# Patient Record
Sex: MALE | Race: WHITE | NOT HISPANIC OR LATINO | Employment: OTHER | URBAN - METROPOLITAN AREA
[De-identification: names, ages, dates, MRNs, and addresses within clinical notes are randomized per-mention and may not be internally consistent; named-entity substitution may affect disease eponyms.]

---

## 2019-01-28 PROCEDURE — 1124F ACP DISCUSS-NO DSCNMKR DOCD: CPT | Performed by: PATHOLOGY

## 2019-02-06 ENCOUNTER — APPOINTMENT (OUTPATIENT)
Dept: LAB | Facility: HOSPITAL | Age: 64
End: 2019-02-06
Attending: ORTHOPAEDIC SURGERY
Payer: MEDICARE

## 2019-02-06 ENCOUNTER — HOSPITAL ENCOUNTER (OUTPATIENT)
Dept: NON INVASIVE DIAGNOSTICS | Facility: HOSPITAL | Age: 64
Discharge: HOME | End: 2019-02-06
Attending: ORTHOPAEDIC SURGERY
Payer: MEDICARE

## 2019-02-06 ENCOUNTER — TRANSCRIBE ORDERS (OUTPATIENT)
Dept: ADMINISTRATIVE | Facility: HOSPITAL | Age: 64
End: 2019-02-06

## 2019-02-06 ENCOUNTER — HOSPITAL ENCOUNTER (OUTPATIENT)
Dept: RADIOLOGY | Facility: HOSPITAL | Age: 64
Discharge: HOME | End: 2019-02-06
Attending: ORTHOPAEDIC SURGERY
Payer: MEDICARE

## 2019-02-06 DIAGNOSIS — M17.11 OSTEOARTHRITIS OF RIGHT KNEE, UNSPECIFIED OSTEOARTHRITIS TYPE: ICD-10-CM

## 2019-02-06 DIAGNOSIS — M17.11 OSTEOARTHRITIS OF RIGHT KNEE, UNSPECIFIED OSTEOARTHRITIS TYPE: Primary | ICD-10-CM

## 2019-02-06 LAB
ABO GROUP BLD: NORMAL
ANION GAP SERPL CALCULATED.3IONS-SCNC: 9 MMOL/L (ref 4–13)
APTT PPP: 57 SECONDS (ref 26–38)
ATRIAL RATE: 69 BPM
BASOPHILS # BLD AUTO: 0.1 THOUSANDS/ΜL (ref 0–0.1)
BASOPHILS NFR BLD AUTO: 1 % (ref 0–2)
BILIRUB UR QL STRIP: NEGATIVE
BLD GP AB SCN SERPL QL: NEGATIVE
BUN SERPL-MCNC: 18 MG/DL (ref 7–25)
CALCIUM SERPL-MCNC: 9.6 MG/DL (ref 8.6–10.5)
CHLORIDE SERPL-SCNC: 110 MMOL/L (ref 98–107)
CLARITY UR: CLEAR
CO2 SERPL-SCNC: 20 MMOL/L (ref 21–31)
COLOR UR: YELLOW
CREAT SERPL-MCNC: 1.05 MG/DL (ref 0.7–1.3)
EOSINOPHIL # BLD AUTO: 0.3 THOUSAND/ΜL (ref 0–0.61)
EOSINOPHIL NFR BLD AUTO: 4 % (ref 0–5)
ERYTHROCYTE [DISTWIDTH] IN BLOOD BY AUTOMATED COUNT: 16 % (ref 11.5–14.5)
GFR SERPL CREATININE-BSD FRML MDRD: 74 ML/MIN/1.73SQ M
GLUCOSE P FAST SERPL-MCNC: 95 MG/DL (ref 65–99)
GLUCOSE UR STRIP-MCNC: NEGATIVE MG/DL
HCT VFR BLD AUTO: 41.8 % (ref 36.5–49.3)
HGB BLD-MCNC: 13.9 G/DL (ref 14–18)
HGB UR QL STRIP.AUTO: NEGATIVE
INR PPP: 0.96 (ref 0.9–1.5)
KETONES UR STRIP-MCNC: NEGATIVE MG/DL
LEUKOCYTE ESTERASE UR QL STRIP: NEGATIVE
LYMPHOCYTES # BLD AUTO: 2.5 THOUSANDS/ΜL (ref 0.6–4.47)
LYMPHOCYTES NFR BLD AUTO: 31 % (ref 21–51)
MCH RBC QN AUTO: 29.3 PG (ref 26–34)
MCHC RBC AUTO-ENTMCNC: 33.3 G/DL (ref 31–37)
MCV RBC AUTO: 88 FL (ref 81–99)
MONOCYTES # BLD AUTO: 0.6 THOUSAND/ΜL (ref 0.17–1.22)
MONOCYTES NFR BLD AUTO: 8 % (ref 2–12)
NEUTROPHILS # BLD AUTO: 4.6 THOUSANDS/ΜL (ref 1.4–6.5)
NEUTS SEG NFR BLD AUTO: 56 % (ref 42–75)
NITRITE UR QL STRIP: NEGATIVE
NRBC BLD AUTO-RTO: 0 /100 WBCS
P AXIS: 7 DEGREES
PH UR STRIP.AUTO: 5.5 [PH] (ref 5–8)
PLATELET # BLD AUTO: 317 THOUSANDS/UL (ref 149–390)
PMV BLD AUTO: 7.8 FL (ref 8.6–11.7)
POTASSIUM SERPL-SCNC: 4.2 MMOL/L (ref 3.5–5.5)
PR INTERVAL: 180 MS
PROT UR STRIP-MCNC: NEGATIVE MG/DL
PROTHROMBIN TIME: 11.1 SECONDS (ref 10.2–13)
QRS AXIS: 13 DEGREES
QRSD INTERVAL: 84 MS
QT INTERVAL: 402 MS
QTC INTERVAL: 430 MS
RBC # BLD AUTO: 4.75 MILLION/UL (ref 4.3–5.9)
RH BLD: POSITIVE
SODIUM SERPL-SCNC: 139 MMOL/L (ref 134–143)
SP GR UR STRIP.AUTO: 1.02 (ref 1–1.03)
SPECIMEN EXPIRATION DATE: NORMAL
T WAVE AXIS: 51 DEGREES
UROBILINOGEN UR QL STRIP.AUTO: 0.2 E.U./DL
VENTRICULAR RATE: 69 BPM
WBC # BLD AUTO: 8.2 THOUSAND/UL (ref 4.8–10.8)

## 2019-02-06 PROCEDURE — 86900 BLOOD TYPING SEROLOGIC ABO: CPT

## 2019-02-06 PROCEDURE — 86850 RBC ANTIBODY SCREEN: CPT

## 2019-02-06 PROCEDURE — 80048 BASIC METABOLIC PNL TOTAL CA: CPT

## 2019-02-06 PROCEDURE — 36415 COLL VENOUS BLD VENIPUNCTURE: CPT

## 2019-02-06 PROCEDURE — 85025 COMPLETE CBC W/AUTO DIFF WBC: CPT

## 2019-02-06 PROCEDURE — 93005 ELECTROCARDIOGRAM TRACING: CPT

## 2019-02-06 PROCEDURE — 85730 THROMBOPLASTIN TIME PARTIAL: CPT

## 2019-02-06 PROCEDURE — 71046 X-RAY EXAM CHEST 2 VIEWS: CPT

## 2019-02-06 PROCEDURE — 93010 ELECTROCARDIOGRAM REPORT: CPT | Performed by: INTERNAL MEDICINE

## 2019-02-06 PROCEDURE — 87086 URINE CULTURE/COLONY COUNT: CPT

## 2019-02-06 PROCEDURE — 86901 BLOOD TYPING SEROLOGIC RH(D): CPT

## 2019-02-06 PROCEDURE — 81003 URINALYSIS AUTO W/O SCOPE: CPT | Performed by: ORTHOPAEDIC SURGERY

## 2019-02-06 PROCEDURE — 85610 PROTHROMBIN TIME: CPT

## 2019-02-07 LAB — BACTERIA UR CULT: NORMAL

## 2019-02-07 RX ORDER — IBUPROFEN 200 MG
600 TABLET ORAL EVERY 8 HOURS PRN
COMMUNITY
End: 2019-03-01 | Stop reason: HOSPADM

## 2019-02-07 RX ORDER — PSEUDOEPHEDRINE HYDROCHLORIDE 30 MG/1
30 TABLET ORAL AS NEEDED
Status: ON HOLD | COMMUNITY
End: 2021-01-13

## 2019-02-07 NOTE — PRE-PROCEDURE INSTRUCTIONS
edication Instructions    ibuprofen (MOTRIN) 200 mg tablet Patient was instructed by Physician and understands  Pt aware to hold motrin for one week prior to surgery    pseudoephedrine (SUDAFED) 30 mg tablet Instructed patient per Anesthesia Guidelines  My Surgical Experience    The following information was developed to assist you to prepare for your operation  What do I need to do before coming to the hospital?   Arrange for a responsible person to drive you to and from the hospital    Arrange care for your children at home  Children are not allowed in the recovery areas of the hospital   Plan to wear clothing that is easy to put on and take off   DO NOT SMOKE THE DAY OF YOUR SURGERY  Bathing  o Shower the evening before and the morning of your surgery with an antibacterial soap  Please refer to the Pre Op Showering Instructions for Surgery Patients Sheet  Shower with Hibiclens the evening before and am of your surgery from the neck down  Wear clean pajamas, sheets and clean cloths in the am   o Remove  all body piercing and jewelry  o   o Do not shave any body part for at least 24 hours before surgery-this includes face, arms, legs and upper body  Food  o Nothing to eat or drink after midnight the night before your surgery  This includes candy and chewing gum  o Do not drink alcohol 24 hours before surgery  Medicine  o Follow instructions you received from your surgeon about which medicines you may take on the day of surgery  o If instructed to take medicine on the morning of surgery, take pills with just a small sip of water  Call your prescribing doctor for specific infroamtion on what to do if you take insulin    What should I bring to the hospital?    Bring:  Chinyere Becerril or a walker, if you have them, for foot or knee surgery   A list of the daily medicines, vitamins, minerals, herbals and nutritional supplements you take   Include the dosages of medicines and the time you take them each day   Glasses, dentures or hearing aids   Minimal clothing; you will be wearing hospital sleepwear   Photo ID; required to verify your identity    Do not bring   Medicines or inhalers   Money, valuables or jewelry    What other information should I know about the day of surgery?  Notify your surgeons if you develop a cold, sore throat, cough, fever, rash or any other illness   Report to the Ambulatory Surgical/Same Day Surgery Unit   You will be instructed to stop at Registration only if you have not been pre-registered   Inform your  fi they do not stay that they will be asked by the staff to leave a phone number where they can be reached   Be available to be reached before surgery  In the event the operating room schedule changes, you may be asked to come in earlier or later than expected    *It is important to tell your doctor and others involved in your health care if you are taking or have been taking any non-prescription drugs, vitamins, minerals, herbals or other nutritional supplements   Any of these may interact with some food or medicines and cause a reaction

## 2019-02-14 ENCOUNTER — ANESTHESIA EVENT (OUTPATIENT)
Dept: PERIOP | Facility: HOSPITAL | Age: 64
DRG: 470 | End: 2019-02-14
Payer: MEDICARE

## 2019-02-14 NOTE — ANESTHESIA PREPROCEDURE EVALUATION
Review of Systems/Medical History  Patient summary reviewed  Chart reviewed      Cardiovascular  EKG reviewed,   Comment: EKG NSR,  Pulmonary    Comment: CXR NAPD     GI/Hepatic            Endo/Other     GYN       Hematology   Musculoskeletal    Arthritis     Neurology   Psychology         Lab Results   Component Value Date    WBC 8 20 02/06/2019    HGB 13 9 (L) 02/06/2019    HCT 41 8 02/06/2019    MCV 88 02/06/2019     02/06/2019     Lab Results   Component Value Date    CALCIUM 9 6 02/06/2019    K 4 2 02/06/2019    CO2 20 (L) 02/06/2019     (H) 02/06/2019    BUN 18 02/06/2019    CREATININE 1 05 02/06/2019     Lab Results   Component Value Date    INR 0 96 02/06/2019    PROTIME 11 1 02/06/2019     Lab Results   Component Value Date    PTT 57 (H) 02/06/2019   repeat ptt 35    Anesthesia Plan  ASA Score- 3     Anesthesia Type- regional and spinal with ASA Monitors  Additional Monitors:   Airway Plan:     Comment: Risks,  benefits and alternatives to Femoral/adductor canal block and IPACK  Sciatic block explained to pt  and accepted questions answered  Risks, benefits and alternatives to Spinal vs  GA explained to pt  and accepted  Questions answered  Pt agrees to Femoral/adductor canal block and IPACK sciatic block and spinal  Risks,  benefits and alternatives to Femoral/adductor canal block and IPACK  Sciatic block explained to pt  and accepted questions answered        Plan Factors- Patient instructed to abstain from smoking on day of procedure  Patient did not smoke on day of surgery  Induction- intravenous  Postoperative Plan- Plan for postoperative opioid use  Informed Consent- Anesthetic plan and risks discussed with patient  I personally reviewed this patient with the CRNA  Discussed and agreed on the Anesthesia Plan with the CRNA  Wesley Schuler

## 2019-02-26 ENCOUNTER — APPOINTMENT (OUTPATIENT)
Dept: RADIOLOGY | Facility: HOSPITAL | Age: 64
DRG: 470 | End: 2019-02-26
Payer: MEDICARE

## 2019-02-26 ENCOUNTER — HOSPITAL ENCOUNTER (INPATIENT)
Facility: HOSPITAL | Age: 64
LOS: 3 days | Discharge: HOME HEALTH | DRG: 470 | End: 2019-03-01
Attending: PHYSICIAN ASSISTANT | Admitting: PHYSICIAN ASSISTANT
Payer: MEDICARE

## 2019-02-26 ENCOUNTER — ANESTHESIA (OUTPATIENT)
Dept: PERIOP | Facility: HOSPITAL | Age: 64
DRG: 470 | End: 2019-02-26
Payer: MEDICARE

## 2019-02-26 DIAGNOSIS — M17.11 PRIMARY OSTEOARTHRITIS OF RIGHT KNEE: Primary | ICD-10-CM

## 2019-02-26 PROBLEM — Z72.0 TOBACCO ABUSE: Status: ACTIVE | Noted: 2019-02-12

## 2019-02-26 PROBLEM — N40.0 BPH (BENIGN PROSTATIC HYPERPLASIA): Status: ACTIVE | Noted: 2019-02-12

## 2019-02-26 PROCEDURE — 94760 N-INVAS EAR/PLS OXIMETRY 1: CPT

## 2019-02-26 PROCEDURE — C1776 JOINT DEVICE (IMPLANTABLE): HCPCS | Performed by: ORTHOPAEDIC SURGERY

## 2019-02-26 PROCEDURE — C1713 ANCHOR/SCREW BN/BN,TIS/BN: HCPCS | Performed by: ORTHOPAEDIC SURGERY

## 2019-02-26 PROCEDURE — 99222 1ST HOSP IP/OBS MODERATE 55: CPT | Performed by: HOSPITALIST

## 2019-02-26 PROCEDURE — 97799 UNLISTED PHYSCL MED/REHAB PX: CPT

## 2019-02-26 PROCEDURE — 97530 THERAPEUTIC ACTIVITIES: CPT

## 2019-02-26 PROCEDURE — 0SRC0J9 REPLACEMENT OF RIGHT KNEE JOINT WITH SYNTHETIC SUBSTITUTE, CEMENTED, OPEN APPROACH: ICD-10-PCS | Performed by: PHYSICIAN ASSISTANT

## 2019-02-26 PROCEDURE — 88311 DECALCIFY TISSUE: CPT | Performed by: PATHOLOGY

## 2019-02-26 PROCEDURE — 88305 TISSUE EXAM BY PATHOLOGIST: CPT | Performed by: PATHOLOGY

## 2019-02-26 PROCEDURE — 3E0T3BZ INTRODUCTION OF ANESTHETIC AGENT INTO PERIPHERAL NERVES AND PLEXI, PERCUTANEOUS APPROACH: ICD-10-PCS | Performed by: ANESTHESIOLOGY

## 2019-02-26 PROCEDURE — 73560 X-RAY EXAM OF KNEE 1 OR 2: CPT

## 2019-02-26 DEVICE — IMPLANTABLE DEVICE
Type: IMPLANTABLE DEVICE | Site: KNEE | Status: FUNCTIONAL
Brand: NEXGEN®

## 2019-02-26 DEVICE — IMPLANTABLE DEVICE
Type: IMPLANTABLE DEVICE | Site: KNEE | Status: FUNCTIONAL
Brand: NEXGEN® LEGACY®

## 2019-02-26 RX ORDER — DEXTROSE MONOHYDRATE AND SODIUM CHLORIDE 5; .45 G/100ML; G/100ML
75 INJECTION, SOLUTION INTRAVENOUS CONTINUOUS
Status: DISCONTINUED | OUTPATIENT
Start: 2019-02-26 | End: 2019-03-01 | Stop reason: HOSPADM

## 2019-02-26 RX ORDER — FENTANYL CITRATE 50 UG/ML
INJECTION, SOLUTION INTRAMUSCULAR; INTRAVENOUS AS NEEDED
Status: DISCONTINUED | OUTPATIENT
Start: 2019-02-26 | End: 2019-02-26 | Stop reason: SURG

## 2019-02-26 RX ORDER — EPHEDRINE SULFATE 50 MG/ML
INJECTION, SOLUTION INTRAVENOUS AS NEEDED
Status: DISCONTINUED | OUTPATIENT
Start: 2019-02-26 | End: 2019-02-26 | Stop reason: SURG

## 2019-02-26 RX ORDER — ONDANSETRON 2 MG/ML
4 INJECTION INTRAMUSCULAR; INTRAVENOUS ONCE
Status: DISCONTINUED | OUTPATIENT
Start: 2019-02-26 | End: 2019-02-26 | Stop reason: HOSPADM

## 2019-02-26 RX ORDER — FENTANYL CITRATE/PF 50 MCG/ML
50 SYRINGE (ML) INJECTION
Status: DISCONTINUED | OUTPATIENT
Start: 2019-02-26 | End: 2019-02-26 | Stop reason: HOSPADM

## 2019-02-26 RX ORDER — PROPOFOL 10 MG/ML
INJECTION, EMULSION INTRAVENOUS CONTINUOUS PRN
Status: DISCONTINUED | OUTPATIENT
Start: 2019-02-26 | End: 2019-02-26 | Stop reason: SURG

## 2019-02-26 RX ORDER — GENTAMICIN SULFATE 80 MG/100ML
80 INJECTION, SOLUTION INTRAVENOUS ONCE
Status: COMPLETED | OUTPATIENT
Start: 2019-02-26 | End: 2019-02-26

## 2019-02-26 RX ORDER — MORPHINE SULFATE 4 MG/ML
3 INJECTION, SOLUTION INTRAMUSCULAR; INTRAVENOUS EVERY 4 HOURS PRN
Status: DISCONTINUED | OUTPATIENT
Start: 2019-02-26 | End: 2019-03-01 | Stop reason: HOSPADM

## 2019-02-26 RX ORDER — HYDROMORPHONE HCL/PF 1 MG/ML
0.4 SYRINGE (ML) INJECTION
Status: DISCONTINUED | OUTPATIENT
Start: 2019-02-26 | End: 2019-02-26 | Stop reason: HOSPADM

## 2019-02-26 RX ORDER — CEFAZOLIN SODIUM 2 G/50ML
2000 SOLUTION INTRAVENOUS ONCE
Status: DISCONTINUED | OUTPATIENT
Start: 2019-02-26 | End: 2019-02-26

## 2019-02-26 RX ORDER — SODIUM CHLORIDE, SODIUM LACTATE, POTASSIUM CHLORIDE, CALCIUM CHLORIDE 600; 310; 30; 20 MG/100ML; MG/100ML; MG/100ML; MG/100ML
125 INJECTION, SOLUTION INTRAVENOUS CONTINUOUS
Status: DISCONTINUED | OUTPATIENT
Start: 2019-02-26 | End: 2019-02-26

## 2019-02-26 RX ORDER — PROPOFOL 10 MG/ML
INJECTION, EMULSION INTRAVENOUS AS NEEDED
Status: DISCONTINUED | OUTPATIENT
Start: 2019-02-26 | End: 2019-02-26 | Stop reason: SURG

## 2019-02-26 RX ORDER — BUPIVACAINE HYDROCHLORIDE 5 MG/ML
INJECTION, SOLUTION PERINEURAL AS NEEDED
Status: DISCONTINUED | OUTPATIENT
Start: 2019-02-26 | End: 2019-02-26 | Stop reason: SURG

## 2019-02-26 RX ORDER — ONDANSETRON 2 MG/ML
4 INJECTION INTRAMUSCULAR; INTRAVENOUS ONCE AS NEEDED
Status: DISCONTINUED | OUTPATIENT
Start: 2019-02-26 | End: 2019-02-26 | Stop reason: HOSPADM

## 2019-02-26 RX ORDER — ROPIVACAINE HYDROCHLORIDE 5 MG/ML
INJECTION, SOLUTION EPIDURAL; INFILTRATION; PERINEURAL AS NEEDED
Status: DISCONTINUED | OUTPATIENT
Start: 2019-02-26 | End: 2019-02-26 | Stop reason: SURG

## 2019-02-26 RX ORDER — DEXAMETHASONE SODIUM PHOSPHATE 4 MG/ML
8 INJECTION, SOLUTION INTRA-ARTICULAR; INTRALESIONAL; INTRAMUSCULAR; INTRAVENOUS; SOFT TISSUE ONCE AS NEEDED
Status: DISCONTINUED | OUTPATIENT
Start: 2019-02-26 | End: 2019-02-26 | Stop reason: HOSPADM

## 2019-02-26 RX ORDER — DOCUSATE SODIUM 100 MG/1
100 CAPSULE, LIQUID FILLED ORAL 2 TIMES DAILY
Status: DISCONTINUED | OUTPATIENT
Start: 2019-02-26 | End: 2019-03-01 | Stop reason: HOSPADM

## 2019-02-26 RX ORDER — MIDAZOLAM HYDROCHLORIDE 1 MG/ML
INJECTION INTRAMUSCULAR; INTRAVENOUS AS NEEDED
Status: DISCONTINUED | OUTPATIENT
Start: 2019-02-26 | End: 2019-02-26 | Stop reason: SURG

## 2019-02-26 RX ORDER — SODIUM CHLORIDE, SODIUM LACTATE, POTASSIUM CHLORIDE, CALCIUM CHLORIDE 600; 310; 30; 20 MG/100ML; MG/100ML; MG/100ML; MG/100ML
100 INJECTION, SOLUTION INTRAVENOUS CONTINUOUS
Status: DISCONTINUED | OUTPATIENT
Start: 2019-02-26 | End: 2019-02-26 | Stop reason: SDUPTHER

## 2019-02-26 RX ORDER — EPINEPHRINE 1 MG/ML
INJECTION, SOLUTION, CONCENTRATE INTRAVENOUS AS NEEDED
Status: DISCONTINUED | OUTPATIENT
Start: 2019-02-26 | End: 2019-02-26 | Stop reason: SURG

## 2019-02-26 RX ORDER — CEFAZOLIN SODIUM 2 G/50ML
SOLUTION INTRAVENOUS AS NEEDED
Status: DISCONTINUED | OUTPATIENT
Start: 2019-02-26 | End: 2019-02-26 | Stop reason: SURG

## 2019-02-26 RX ORDER — POVIDONE-IODINE 10 MG/G
OINTMENT TOPICAL AS NEEDED
Status: DISCONTINUED | OUTPATIENT
Start: 2019-02-26 | End: 2019-02-26 | Stop reason: HOSPADM

## 2019-02-26 RX ORDER — ONDANSETRON 2 MG/ML
INJECTION INTRAMUSCULAR; INTRAVENOUS AS NEEDED
Status: DISCONTINUED | OUTPATIENT
Start: 2019-02-26 | End: 2019-02-26 | Stop reason: SURG

## 2019-02-26 RX ORDER — ACETAMINOPHEN 325 MG/1
650 TABLET ORAL EVERY 4 HOURS PRN
Status: DISCONTINUED | OUTPATIENT
Start: 2019-02-26 | End: 2019-03-01 | Stop reason: HOSPADM

## 2019-02-26 RX ORDER — OXYCODONE HYDROCHLORIDE AND ACETAMINOPHEN 5; 325 MG/1; MG/1
1 TABLET ORAL EVERY 4 HOURS PRN
Status: DISCONTINUED | OUTPATIENT
Start: 2019-02-26 | End: 2019-03-01 | Stop reason: HOSPADM

## 2019-02-26 RX ORDER — CEFAZOLIN SODIUM 1 G/50ML
1000 SOLUTION INTRAVENOUS EVERY 8 HOURS
Status: COMPLETED | OUTPATIENT
Start: 2019-02-26 | End: 2019-02-27

## 2019-02-26 RX ORDER — FONDAPARINUX SODIUM 2.5 MG/.5ML
2.5 INJECTION SUBCUTANEOUS DAILY
Status: DISCONTINUED | OUTPATIENT
Start: 2019-02-27 | End: 2019-03-01 | Stop reason: HOSPADM

## 2019-02-26 RX ADMIN — BUPIVACAINE HYDROCHLORIDE 3 ML: 5 INJECTION, SOLUTION PERINEURAL at 07:20

## 2019-02-26 RX ADMIN — EPINEPHRINE 0.1 MG: 1 INJECTION, SOLUTION INTRAMUSCULAR; SUBCUTANEOUS at 07:00

## 2019-02-26 RX ADMIN — ONDANSETRON HYDROCHLORIDE 4 MG: 2 INJECTION, SOLUTION INTRAVENOUS at 09:03

## 2019-02-26 RX ADMIN — MORPHINE SULFATE 3 MG: 4 INJECTION INTRAVENOUS at 17:42

## 2019-02-26 RX ADMIN — ROPIVACAINE HYDROCHLORIDE 10 ML: 5 INJECTION, SOLUTION EPIDURAL; INFILTRATION; PERINEURAL at 07:00

## 2019-02-26 RX ADMIN — FENTANYL CITRATE 50 MCG: 50 INJECTION INTRAMUSCULAR; INTRAVENOUS at 07:17

## 2019-02-26 RX ADMIN — MORPHINE SULFATE 3 MG: 4 INJECTION INTRAVENOUS at 21:38

## 2019-02-26 RX ADMIN — FENTANYL CITRATE 100 MCG: 50 INJECTION INTRAMUSCULAR; INTRAVENOUS at 06:56

## 2019-02-26 RX ADMIN — DOCUSATE SODIUM 100 MG: 100 CAPSULE, LIQUID FILLED ORAL at 12:03

## 2019-02-26 RX ADMIN — SODIUM CHLORIDE, POTASSIUM CHLORIDE, SODIUM LACTATE AND CALCIUM CHLORIDE 125 ML/HR: 600; 310; 30; 20 INJECTION, SOLUTION INTRAVENOUS at 05:23

## 2019-02-26 RX ADMIN — CEFAZOLIN SODIUM 1000 MG: 1 SOLUTION INTRAVENOUS at 15:22

## 2019-02-26 RX ADMIN — ROPIVACAINE HYDROCHLORIDE 10 ML: 5 INJECTION, SOLUTION EPIDURAL; INFILTRATION; PERINEURAL at 07:08

## 2019-02-26 RX ADMIN — DEXAMETHASONE SODIUM PHOSPHATE 10 MG: 10 INJECTION INTRAMUSCULAR; INTRAVENOUS at 07:08

## 2019-02-26 RX ADMIN — PROPOFOL 30 MG: 10 INJECTION, EMULSION INTRAVENOUS at 07:22

## 2019-02-26 RX ADMIN — BUPIVACAINE HYDROCHLORIDE 10 ML: 5 INJECTION, SOLUTION PERINEURAL at 07:08

## 2019-02-26 RX ADMIN — CEFAZOLIN SODIUM 1000 MG: 1 SOLUTION INTRAVENOUS at 23:52

## 2019-02-26 RX ADMIN — CEFAZOLIN SODIUM 2000 MG: 2 SOLUTION INTRAVENOUS at 07:41

## 2019-02-26 RX ADMIN — MIDAZOLAM HYDROCHLORIDE 1 MG: 1 INJECTION, SOLUTION INTRAMUSCULAR; INTRAVENOUS at 07:17

## 2019-02-26 RX ADMIN — MIDAZOLAM HYDROCHLORIDE 2 MG: 1 INJECTION, SOLUTION INTRAMUSCULAR; INTRAVENOUS at 06:56

## 2019-02-26 RX ADMIN — MIDAZOLAM HYDROCHLORIDE 1 MG: 1 INJECTION, SOLUTION INTRAMUSCULAR; INTRAVENOUS at 07:06

## 2019-02-26 RX ADMIN — MORPHINE SULFATE 3 MG: 4 INJECTION INTRAVENOUS at 13:30

## 2019-02-26 RX ADMIN — OXYCODONE HYDROCHLORIDE AND ACETAMINOPHEN 1 TABLET: 5; 325 TABLET ORAL at 20:22

## 2019-02-26 RX ADMIN — FENTANYL CITRATE 50 MCG: 50 INJECTION INTRAMUSCULAR; INTRAVENOUS at 07:06

## 2019-02-26 RX ADMIN — OXYCODONE HYDROCHLORIDE AND ACETAMINOPHEN 1 TABLET: 5; 325 TABLET ORAL at 15:22

## 2019-02-26 RX ADMIN — SODIUM CHLORIDE, POTASSIUM CHLORIDE, SODIUM LACTATE AND CALCIUM CHLORIDE: 600; 310; 30; 20 INJECTION, SOLUTION INTRAVENOUS at 07:47

## 2019-02-26 RX ADMIN — LIDOCAINE HYDROCHLORIDE 100 MG: 20 INJECTION, SOLUTION INTRAVENOUS at 07:21

## 2019-02-26 RX ADMIN — EPHEDRINE SULFATE 10 MG: 50 INJECTION INTRAMUSCULAR; INTRAVENOUS; SUBCUTANEOUS at 07:36

## 2019-02-26 RX ADMIN — PROPOFOL 30 MG: 10 INJECTION, EMULSION INTRAVENOUS at 08:08

## 2019-02-26 RX ADMIN — DEXTROSE AND SODIUM CHLORIDE 75 ML/HR: 5; 450 INJECTION, SOLUTION INTRAVENOUS at 12:03

## 2019-02-26 RX ADMIN — EPINEPHRINE 0.05 MG: 1 INJECTION, SOLUTION INTRAMUSCULAR; SUBCUTANEOUS at 07:20

## 2019-02-26 RX ADMIN — GENTAMICIN SULFATE 80 MG: 80 INJECTION, SOLUTION INTRAVENOUS at 07:30

## 2019-02-26 RX ADMIN — PROPOFOL 50 MCG/KG/MIN: 10 INJECTION, EMULSION INTRAVENOUS at 07:23

## 2019-02-26 RX ADMIN — SODIUM CHLORIDE, POTASSIUM CHLORIDE, SODIUM LACTATE AND CALCIUM CHLORIDE: 600; 310; 30; 20 INJECTION, SOLUTION INTRAVENOUS at 09:05

## 2019-02-26 RX ADMIN — BUPIVACAINE HYDROCHLORIDE 10 ML: 5 INJECTION, SOLUTION PERINEURAL at 07:00

## 2019-02-26 NOTE — ANESTHESIA PROCEDURE NOTES
Peripheral Block IPACK    Patient location during procedure: pre-op  Start time: 2/26/2019 7:00 AM  Reason for block: at surgeon's request and post-op pain management  Staffing  Anesthesiologist: January Meza MD  Performed: anesthesiologist   Preanesthetic Checklist  Completed: patient identified, site marked, surgical consent, pre-op evaluation, timeout performed, IV checked, risks and benefits discussed and monitors and equipment checked  Peripheral Block  Patient position: left lateral decubitus  Prep: Betadine  Patient monitoring: heart rate, cardiac monitor, continuous pulse ox and frequent blood pressure checks  Block type: sciatic (IPACK)  Laterality: right  Injection technique: single-shot  Procedures: ultrasound guided and nerve stimulator  Ultrasound permanent image saved  Needle  Needle type: Stimuplex   Needle gauge: 21 G  Needle length: 10 cm  Needle localization: nerve stimulator and ultrasound guidance  Assessment  Injection assessment: incremental injection, local visualized surrounding nerve on ultrasound, negative aspiration for CSF, negative aspiration for heme and no paresthesia on injection  Paresthesia pain: none  Heart rate change: no  Slow fractionated injection: yes  Post-procedure:  site cleaned  patient tolerated the procedure well with no immediate complications  Additional Notes  IPACK block of Sciatic Nerve performed in lateral decubitus  US guidance for Local anesthetic injection between Popliteal Artery and Posterior capsule of Knee/ Posterior Femur   25%Ropivicaine 20 ml and  25%Bupivicaine 20ml  with 0 1mg Epinephrine

## 2019-02-26 NOTE — ANESTHESIA POSTPROCEDURE EVALUATION
Post-Op Assessment Note    CV Status:  Stable  Pain Score: 0    Pain management: adequate     Mental Status:  Alert and awake   Hydration Status:  Euvolemic   PONV Controlled:  Controlled   Airway Patency:  Patent   Post Op Vitals Reviewed: Yes      Staff: CRNA           BP   119/68   Temp   97 1   Pulse  79   Resp   16   SpO2   97

## 2019-02-26 NOTE — H&P
H&P Exam - Orthopedics   Radha Brown 70 y o  male MRN: [id number]  Unit/Bed#: OR [place] Encounter: [id number]    Assessment/Plan     Assessment:  Advanced DJD of right knee  Plan:  Elective right total knee replacement    History of Present Illness   HPI:  Radha Brown is a 70 y o  male who presented to our office complaining of bilateral knee pain worse on the right  Patient states he has had the above symptoms in the past several years  His right knee has been progressive and maybe more compensatory nature  X-rays performed showed advanced arthritic changes with no medial joint space remaining  There was subluxation of the joint, subchondral sclerosis and osteophyte formation  The risks and benefits of surgery were discussed with the patient, he decided on the elective right total knee replacement  Review of Systems    Historical Information   Past Medical History:   Diagnosis Date    Arthritis     Rotator cuff injury     left shoulder     Past Surgical History:   Procedure Laterality Date    MULTIPLE TOOTH EXTRACTIONS      in office under local    TONSILLECTOMY       Social History   Social History     Substance and Sexual Activity   Alcohol Use Yes    Comment: 4-6 drinks a week     Social History     Substance and Sexual Activity   Drug Use No     Social History     Tobacco Use   Smoking Status Light Tobacco Smoker    Packs/day: 0 25   Smokeless Tobacco Never Used   Tobacco Comment    2 packs a week     Family History: non-contributory    Meds/Allergies   all medications and allergies reviewed  No Known Allergies    Objective   Vitals: Blood pressure 118/82, pulse 98, temperature 98 7 °F (37 1 °C), temperature source Temporal, resp  rate 20, height 5' 6" (1 676 m), weight 90 7 kg (200 lb), SpO2 98 %  ,Body mass index is 32 28 kg/m²  No intake or output data in the 24 hours ending 02/26/19 0635    No intake/output data recorded      Invasive Devices     Peripheral Intravenous Line Peripheral IV 02/26/19 Left Wrist less than 1 day                Physical Exam  Ortho Exam   Right lower extremity neurovascular intact  Toes are pink and mobile  Compartments are soft  There is no warmth, erythema or ecchymosis  There is varus deformity present in both knees, more prominent on the right the left  There is negative Lachman, drawer pivot shift  There is a medial instability  There is medial joint line tenderness and slightly lateral joint line tenderness  There is patellofemoral crepitation and painful arc of motion throughout exam    Heart S1S2 RRR  Lungs CTA B/L    Lab Results: I have personally reviewed pertinent lab results  Imaging: I have personally reviewed pertinent reports  EKG, Pathology, and Other Studies: I have personally reviewed pertinent reports  Code Status: No Order  Advance Directive and Living Will:      Power of :    POLST:      Counseling / Coordination of Care  Total floor / unit time spent today 30 minutes  Greater than 50% of total time was spent with the patient and / or family counseling and / or coordination of care

## 2019-02-26 NOTE — ANESTHESIA PROCEDURE NOTES
Peripheral Block ACB    Patient location during procedure: pre-op  Start time: 2/26/2019 7:08 AM  Reason for block: at surgeon's request and post-op pain management  Staffing  Anesthesiologist: Kathleen Flannery MD  Performed: anesthesiologist   Preanesthetic Checklist  Completed: patient identified, site marked, surgical consent, pre-op evaluation, timeout performed, IV checked, risks and benefits discussed and monitors and equipment checked  Peripheral Block  Patient position: supine  Prep: Betadine  Patient monitoring: cardiac monitor, continuous pulse ox, frequent blood pressure checks and heart rate  Block type: adductor canal block  Laterality: right  Injection technique: single-shot  Procedures: ultrasound guided and nerve stimulator  Ultrasound permanent image saved  Needle  Needle type: Stimuplex   Needle gauge: 21 G  Needle length: 10 cm  Needle localization: nerve stimulator and ultrasound guidance  Test dose: negative  Assessment  Injection assessment: incremental injection, local visualized surrounding nerve on ultrasound, negative aspiration for CSF, negative aspiration for heme and no paresthesia on injection  Paresthesia pain: none  Heart rate change: no  Post-procedure:  site cleaned  patient tolerated the procedure well with no immediate complications  Additional Notes  Nerve Stimulator used: No twitch below 0 4 mAmp

## 2019-02-26 NOTE — ADDENDUM NOTE
Addendum  created 02/26/19 1150 by Scarlet Frazier, CRNA    Intraprocedure Meds edited, Orders acknowledged in Narrator

## 2019-02-26 NOTE — ANESTHESIA PREPROCEDURE EVALUATION
Review of Systems/Medical History  Patient summary reviewed  Chart reviewed      Cardiovascular  EKG reviewed,   Comment: NSR,  Pulmonary    Comment: CXR NAPD     GI/Hepatic            Endo/Other     GYN       Hematology   Musculoskeletal    Arthritis     Neurology   Psychology         Lab Results   Component Value Date    WBC 8 20 02/06/2019    HGB 13 9 (L) 02/06/2019    HCT 41 8 02/06/2019    MCV 88 02/06/2019     02/06/2019     Lab Results   Component Value Date    CALCIUM 9 6 02/06/2019    K 4 2 02/06/2019    CO2 20 (L) 02/06/2019     (H) 02/06/2019    BUN 18 02/06/2019    CREATININE 1 05 02/06/2019     Lab Results   Component Value Date    INR 0 96 02/06/2019    PROTIME 11 1 02/06/2019     Lab Results   Component Value Date    PTT 57 (H) 02/06/2019     Repeat  PTT 35  Physical Exam    Airway    Mallampati score: III  TM Distance: >3 FB  Neck ROM: full     Dental   upper dentures and lower dentures,     Cardiovascular  Cardiovascular exam normal    Pulmonary  Pulmonary exam normal     Other Findings        Anesthesia Plan  ASA Score- 2     Anesthesia Type- spinal with ASA Monitors  Additional Monitors:   Airway Plan:     Comment: Risks,  benefits and alternatives to Femoral/adductor canal block and IPACK  Sciatic block explained to pt  and accepted questions answered  Risks, benefits and alternatives to Spinal vs  GA explained to pt  and accepted  Questions answered  Pt agrees to Femoral/adductor canal block and IPACK sciatic block and spinal        Plan Factors- Patient instructed to abstain from smoking on day of procedure  Patient did not smoke on day of surgery  Induction- intravenous  Postoperative Plan- Plan for postoperative opioid use  Informed Consent- Anesthetic plan and risks discussed with patient  I personally reviewed this patient with the CRNA  Discussed and agreed on the Anesthesia Plan with the CRNA  Wesley Schuler

## 2019-02-26 NOTE — PHYSICAL THERAPY NOTE
PHYSICAL THERAPY NOTE          Patient Name: Lisa Jackson  QIWYJ'A Date: 2/26/2019    Pt seen in PACU to fit & apply R CPM 0-70 degrees per post op protocol without issue      Florencia Bartholomew, PTA

## 2019-02-26 NOTE — PLAN OF CARE
Problem: Potential for Falls  Goal: Patient will remain free of falls  Description  INTERVENTIONS:  - Assess patient frequently for physical needs  -  Identify cognitive and physical deficits and behaviors that affect risk of falls    -  [place] fall precautions as indicated by assessment   - Educate patient/family on patient safety including physical limitations  - Instruct patient to call for assistance with activity based on assessment  - Modify environment to reduce risk of injury  - Consider OT/PT consult to assist with strengthening/mobility  Outcome: Progressing     Problem: SAFETY ADULT  Goal: Maintain or return to baseline ADL function  Description  INTERVENTIONS:  -  Assess patient's ability to carry out ADLs; assess patient's baseline for ADL function and identify physical deficits which impact ability to perform ADLs (bathing, care of mouth/teeth, toileting, grooming, dressing, etc )  - Assess/evaluate cause of self-care deficits   - Assess range of motion  - Assess patient's mobility; develop plan if impaired  - Assess patient's need for assistive devices and provide as appropriate  - Encourage maximum independence but intervene and supervise when necessary  ¯ Involve family in performance of ADLs  ¯ Assess for home care needs following discharge   ¯ Request OT consult to assist with ADL evaluation and planning for discharge  ¯ Provide patient education as appropriate  Outcome: Progressing  Goal: Maintain or return mobility status to optimal level  Description  INTERVENTIONS:  - Assess patient's baseline mobility status (ambulation, transfers, stairs, etc )    - Identify cognitive and physical deficits and behaviors that affect mobility  - Identify mobility aids required to assist with transfers and/or ambulation (gait belt, sit-to-stand, lift, walker, cane, etc )  - [place] fall precautions as indicated by assessment  - Record patient progress and toleration of activity level on Mobility SBAR; progress patient to next Phase/Stage  - Instruct patient to call for assistance with activity based on assessment  - Request Rehabilitation consult to assist with strengthening/weightbearing, etc   Outcome: Progressing     Problem: DISCHARGE PLANNING  Goal: Discharge to home or other facility with appropriate resources  Description  INTERVENTIONS:  - Identify barriers to discharge w/patient and caregiver  - Arrange for needed discharge resources and transportation as appropriate  - Identify discharge learning needs (meds, wound care, etc )  - Arrange for interpretive services to assist at discharge as needed  - Refer to Case Management Department for coordinating discharge planning if the patient needs post-hospital services based on physician/advanced practitioner order or complex needs related to functional status, cognitive ability, or social support system  Outcome: Progressing     Problem: Knowledge Deficit  Goal: Patient/family/caregiver demonstrates understanding of disease process, treatment plan, medications, and discharge instructions  Description  Complete learning assessment and assess knowledge base  Interventions:  - Provide teaching at level of understanding  - Provide teaching via preferred learning methods  Outcome: Progressing     Problem: NEUROSENSORY - ADULT  Goal: Achieves stable or improved neurological status  Description  INTERVENTIONS  - Monitor and report changes in neurological status  - Initiate measures to prevent increased intracranial pressure  - Maintain blood pressure and fluid volume within ordered parameters to optimize cerebral perfusion  - Monitor temperature, glucose, and sodium or any other associated labs   Initiate appropriate interventions as ordered  - Monitor for seizure activity   - Administer anti-seizure medications as ordered  Outcome: Progressing  Goal: Absence of seizures  Description  INTERVENTIONS  - Monitor for seizure activity  - Administer anti-seizure medications as ordered  - Monitor neurological status  Outcome: Progressing  Goal: Remains free of injury related to seizures activity  Description  INTERVENTIONS  - Maintain airway, patient safety  and administer oxygen as ordered  - Monitor patient for seizure activity, document and report duration and description of seizure to physician/advanced practitioner  - If seizure occurs,  ensure patient safety during seizure  - Reorient patient post seizure  - Seizure pads on all 4 side rails  - Instruct patient/family to notify RN of any seizure activity including if an aura is experienced  - Instruct patient/family to call for assistance with activity based on nursing assessment  - Administer anti-seizure medications as ordered  - Monitor fetal well being  Outcome: Progressing  Goal: Achieves maximal functionality and self care  Description  INTERVENTIONS  - Monitor swallowing and airway patency with patient fatigue and changes in neurological status  - Encourage and assist patient to increase activity and self care with guidance from rehab services  - Encourage visually impaired, hearing impaired and aphasic patients to use assistive/communication devices  Outcome: Progressing     Problem: GENITOURINARY - ADULT  Goal: Maintains or returns to baseline urinary function  Description  INTERVENTIONS:  - Assess urinary function  - Encourage oral fluids to ensure adequate hydration  - Administer IV fluids as ordered to ensure adequate hydration  - Administer ordered medications as needed  - Offer frequent toileting  - Follow urinary retention protocol if ordered  Outcome: Progressing  Goal: Absence of urinary retention  Description  INTERVENTIONS:  - Assess patient?s ability to void and empty bladder  - Monitor I/O  - Bladder scan as needed  - Discuss with physician/AP medications to alleviate retention as needed  - Discuss catheterization for long term situations as appropriate  Outcome: Progressing  Goal: Urinary catheter remains patent  Description  INTERVENTIONS:  - Assess patency of urinary catheter  - If patient has a chronic duff, consider changing catheter if non-functioning  - Follow guidelines for intermittent irrigation of non-functioning urinary catheter  Outcome: Progressing     Problem: HEMATOLOGIC - ADULT  Goal: Maintains hematologic stability  Description  INTERVENTIONS  - Assess for signs and symptoms of bleeding or hemorrhage  - Monitor labs  - Administer supportive blood products/factors as ordered and appropriate  Outcome: Progressing     Problem: MUSCULOSKELETAL - ADULT  Goal: Maintain or return mobility to safest level of function  Description  INTERVENTIONS:  - Assess patient's ability to carry out ADLs; assess patient's baseline for ADL function and identify physical deficits which impact ability to perform ADLs (bathing, care of mouth/teeth, toileting, grooming, dressing, etc )  - Assess/evaluate cause of self-care deficits   - Assess range of motion  - Assess patient's mobility; develop plan if impaired  - Assess patient's need for assistive devices and provide as appropriate  - Encourage maximum independence but intervene and supervise when necessary  - Involve family in performance of ADLs  - Assess for home care needs following discharge   - Request OT consult to assist with ADL evaluation and planning for discharge  - Provide patient education as appropriate  Outcome: Progressing  Goal: Maintain proper alignment of affected body part  Description  INTERVENTIONS:  - Support, maintain and protect limb and body alignment  - Provide pt/fam with appropriate education  Outcome: Progressing

## 2019-02-26 NOTE — NURSING NOTE
Pt received from recovery to room 103 1 around 1030, oriented to the room and the callbell, pt verbalized understanding, tolerated 1st autotransfusion well, pain controlled with the  meds ordered at this time, neurovasc checks to ble intact at this time, right knee dsg cdi, skin pwd, callbell in reach of the pt, encouraged to ring with any needs and the pt agrees

## 2019-02-26 NOTE — CONSULTS
Consult- Trina Calvo 1955, 70 y o  male MRN: [id number]    Unit/Bed#: -01 Encounter: [id number]    Primary Care Provider: Kp Shultz MD   Date and time admitted to hospital: 2/26/2019  4:53 AM      Inpatient consult to Internal Medicine  Consult performed by: Ish Padron MD  Consult ordered by: Valentino Edouard PA-C          * Primary osteoarthritis of right knee  Assessment & Plan  · Patient is status post a total right knee arthroplasty postop day 0  · All management including pain and DVT prophylaxis as per the primary service    Tobacco abuse  Assessment & Plan  · Cessation counseling provided  · Patient is currently refusing a nicotine patch      VTE Prophylaxis: Fondaparinux (Arixtra)      Recommendations for Discharge:  · Per Primary Service    Counseling / Coordination of Care Time: 45 minutes  Greater than 50% of total time spent on patient counseling and coordination of care  History of Present Illness:  Trina Calvo is a 70 y o  male who is originally admitted to the orthopedic service due to advanced osteoarthritis of the right knee  We are consulted for the routine postop medical management  Patient seen and examined in room 103  He is resting comfortably  He currently rates the pain in his right knee as a 1/10  He still has the affects of anesthesia in him  He denies any type of medical history  He denies diabetes, heart disease, cancers, hypercholesterolemia etc   He currently denies any chest pain, nausea, vomiting, diarrhea, fevers, chills, palpitations, shortness of breath, numbness, tingling and/or weakness  He states that he has a very healthy person otherwise  He does have a longstanding history of tobacco use and abuse  Once again hospital Medicine has been consulted for routine postop medical management  Review of Systems:  Review of Systems   Constitutional: Negative for appetite change, chills, diaphoresis, fatigue and fever  Respiratory: Negative for cough, chest tightness and shortness of breath  Cardiovascular: Negative for chest pain, palpitations and leg swelling  Gastrointestinal: Negative for abdominal pain, blood in stool, constipation, diarrhea, nausea and vomiting  Genitourinary: Negative for difficulty urinating, dysuria, hematuria and urgency  Skin: Negative for color change and rash  Allergic/Immunologic: Negative for food allergies  Neurological: Negative for dizziness, weakness, numbness and headaches  All other systems reviewed and are negative  Past Medical and Surgical History:   Past Medical History:   Diagnosis Date    Arthritis     Rotator cuff injury     left shoulder       Past Surgical History:   Procedure Laterality Date    MULTIPLE TOOTH EXTRACTIONS      in office under local    TONSILLECTOMY         Meds/Allergies:  PTA meds:   Prior to Admission Medications   Prescriptions Last Dose Informant Patient Reported?  Taking?   ibuprofen (MOTRIN) 200 mg tablet 2/18/2019  Yes Yes   Sig: Take 600 mg by mouth every 8 (eight) hours as needed for mild pain   pseudoephedrine (SUDAFED) 30 mg tablet 2/10/2019  Yes Yes   Sig: Take 30 mg by mouth as needed for congestion      Facility-Administered Medications: None       Allergies: No Known Allergies    Social History:     Marital Status:     Substance Use History:   Social History     Substance and Sexual Activity   Alcohol Use Yes    Comment: 4-6 drinks a week     Social History     Tobacco Use   Smoking Status Light Tobacco Smoker    Packs/day: 0 25   Smokeless Tobacco Current User   Tobacco Comment    2 packs a week     Social History     Substance and Sexual Activity   Drug Use No       Family History:  I have reviewed the patients family history    Physical Exam:   Vitals:   Blood Pressure: 108/65 (02/26/19 1251)  Pulse: 83 (02/26/19 1251)  Temperature: (!) 97 °F (36 1 °C) (02/26/19 1251)  Temp Source: Tympanic (02/26/19 1251)  Respirations: 18 (02/26/19 1251)  Height: 5' 6" (167 6 cm) (02/26/19 0514)  Weight - Scale: 90 7 kg (200 lb) (02/26/19 0514)  SpO2: 93 % (02/26/19 1251)    Physical Exam   Constitutional: He is oriented to person, place, and time  He appears well-developed and well-nourished  HENT:   Head: Normocephalic and atraumatic  Nose: Nose normal    Mouth/Throat: Oropharynx is clear and moist    Eyes: Pupils are equal, round, and reactive to light  Conjunctivae and EOM are normal    Neck: Normal range of motion  Neck supple  No JVD present  No thyromegaly present  Cardiovascular: Normal rate, regular rhythm and intact distal pulses  Exam reveals no gallop and no friction rub  No murmur heard  Pulmonary/Chest: Effort normal and breath sounds normal  No respiratory distress  Abdominal: Soft  Bowel sounds are normal  He exhibits no distension and no mass  There is no tenderness  There is no guarding  Musculoskeletal: Normal range of motion  He exhibits no edema  Appropriate postop day 0 dressings drains and tubes are in place in the right knee   Lymphadenopathy:     He has no cervical adenopathy  Neurological: He is alert and oriented to person, place, and time  No cranial nerve deficit  Skin: Skin is warm  No rash noted  No erythema  Psychiatric: He has a normal mood and affect  His behavior is normal    Vitals reviewed  Additional Data:   Lab Results: I have personally reviewed pertinent reports  Invalid input(s): LABALBU          No results found for: HGBA1C        Imaging: I have personally reviewed pertinent reports  XR knee right 1 or 2 views    (Results Pending)       EKG, Pathology, and Other Studies Reviewed on Admission:   · EKG:  Not reviewed    ** Please Note: This note has been constructed using a voice recognition system   **

## 2019-02-26 NOTE — ANESTHESIA PROCEDURE NOTES
Spinal Block    Patient location during procedure: OR  Start time: 2/26/2019 7:20 AM  Reason for block: at surgeon's request and primary anesthetic  Staffing  Anesthesiologist: Deni Moreno MD  Performed: anesthesiologist   Preanesthetic Checklist  Completed: patient identified, site marked, surgical consent, pre-op evaluation, timeout performed, IV checked, risks and benefits discussed and monitors and equipment checked  Spinal Block  Patient position: sitting  Prep: Betadine  Patient monitoring: cardiac monitor, continuous pulse ox, frequent blood pressure checks and heart rate  Approach: right paramedian  Location: L3-4  Injection technique: single-shot  Needle  Needle type: pencil-tip   Needle gauge: 25 G  Needle length: 10 cm  Assessment  Sensory level: T4  Injection Assessment:  negative aspiration for heme, no paresthesia on injection and positive aspiration for clear CSF  Post-procedure:  site cleaned  Additional Notes  Spinal performed in Sitting Position, betadine X3, Local injected, 25g Pencan used X1 paramedian  +free flow CSF in 360 before and after injection 15mg IsoBaric Preservative-free Marcaine, w/ Epi wash  No Blood or paresthesia  Pt  Tolerated procedure well, no complications

## 2019-02-26 NOTE — OP NOTE
OPERATIVE REPORT  PATIENT NAME: Sravani Thompson    :  1955  MRN: [id number]  Pt Location: [facility] OR ROOM 03    SURGERY DATE: 2019    Surgeon(s) and Role:     * Gene Sherwood, DO - Primary    Assistant:  Abdiel Swan PA-C    Preop Diagnosis:  Primary osteoarthritis of right knee [M17 11]    Post-Op Diagnosis Codes:     * Primary osteoarthritis of right knee [M17 11]    Procedure(s) (LRB):  ARTHROPLASTY KNEE TOTAL (Right) using the Laura NextGen system with the following sizes:  E LPS femoral component, 5# Tibial tray, 12 mm polyarticular surface, and a 35 mm patella button    Specimen(s):  Bone/synovium    Estimated Blood Loss:   50cc    Drains:  solcotrans    Anesthesia Type:   FNBIPACK with spinal    Operative Indications:  Primary osteoarthritis of right knee [M17 11]      Operative Findings:  TT:  76    Complications:   None    Procedure and Technique:    Procedure and Technique:  The patient was properly identified and brought to the operative suite   After successful induction of the spinal anesthetic, a  tourniquet was placed on the patient's right proximal thigh   The right lower extremity was prepped and draped in the usual usual sterile fashion   It was medically necessary that the [de-identified] assistant be in the room to aid in positioning placing the appropriate amount of retraction the patient       He was given a dose of intravenous antibiotics   Surgical landmarks were outline  With  a skin marker   An Esmarch was used to examine the right lower extremity and the tourniquet was then inflated   Using a #10  Scalpel  Blade, an anterior incision was made on patient's right knee   Hemostasis was achieved with the use of electrocautery   Through  a significant amount of dissection through adipose tissue the capsule and  quadricep tendon  Was  then located   Using a 2nd 10   Scalpel blade a medial parapatellar arthrotomy did occur with rush of clear synovial fluid   A posteriormedial sleeve was developed to the level of the semi membranous  tendon  The patella then everted and  knee was flexed   The retro and  superior fat pads were excised  The cruciate ligaments were  divided  At this point the proximal tibia could  be anteriorly subluxed   An intramedullary drill hole was placed in the proximal tibia, followed by the external cutting jig   The  smallest portion of deficient medial side bone was to  be removed, with its corresponding lateral side   Varus and  valgus angulation is also checked   The collateral ligaments were then  protected   The neurovascular bundle was then protected   The proximal tibia was then osteotomized   Attention then paid to the preparation of distal femur   The intramedullary natalya was placed with a 6 degree valgus cut placed approximately 3° of external rotation   The external  cutting jig was placed on top of this   An additional 2 mm cut the because of a flexion contracture   The collateral ligaments were protected   The  distal femur was then osteotomized   The femur was then sized   A size E did have the best fit    Both gender and  standard cutting blocks were checked and a standard specific  cutting block did  Have the best fit  Without any excessive notching of the anterior condyle   The collaterals were then protected, the distal femur was an osteotimized in the following sequence 1 anterior condyle 2  posterior condyle 3 posterior chamfer and 4 anterior chamfer   At this point all the bone fragments and then removed   A lamina  was placed both the medial and lateral sides and the redundant menisci and posterior osteophytes were then removed   Flexion-extension blocks were placed next and a 12 mm block gave good symmetric balancing   The femur was then finished with the trochlear recess and notch block placed in a slightly lateral position to facilitate tracking patella   The tibial was sized next   A size #5 did have  best fit   The size E-LPS femoral component placed, followed by a number 5   Tibial tray, followed by several polyarticular services and a 12 mm tray did the best fit   The rotation of the [place] was then marked   The patella was inspected next  Elissa King is a tremendous amount of arthrosis   The peripheral osteophytes removed with an removed   With that confirmed a caliper that there is adequate bone stock   And then using his injuries Laura reaming system approximately 9 mm of undersurface patella then reamed   The patella sized to a 35 mm patella button   This guide was drilled in a slightly superior medial position facilitate tracking patella   The tibia was then finished with a drill hole drill hole and keel punch   At this point there is there is good stability and range of motion in the knee   All the trial components removed   The wound was copiously irrigated sterile antibiotic solution  Cement  was being mixed on the back table was used 3rd generation cementing techniques   The proximal tibia cemented in placed followed by distal femur   A trial poly articular surface was placed till the cement fully cured   The patella was  cemented in place and held with a patellar clamp till the cement fully cured as well  Once the cement fully cured, it was irrigated   The polyarticular surfaces were  tried once again and a 12 mm tray to the best fit   The final 12 mm tray was placed reduced 1 final time and found to be quite stable   After it was irrigated,  a quarter-inch solcotrans  Was  placed near the superior aspect  Of the incision   The quadriceps  and capsule were closed with 1   Vicryl   Deep layer fascia closed multiple layers of 0 Vicryl   Superficial was closed with 2 Vicryl   The skin was approximated  With staples   The wounds were dressed with ointment Xeroform 4x4s ABDs sterile Webril and Ace bandage   There is no complication of procedure   She was successfully awoken,  transferred  To the hospital bed,  And went to the recovery room stable  In condition                   I was present for the entire procedure    Patient Disposition:  PACU     SIGNATURE: Phil Rodriguez DO  DATE: February 26, 2019  TIME: 7:14 AM

## 2019-02-26 NOTE — ASSESSMENT & PLAN NOTE
· Patient is status post a total right knee arthroplasty postop day 0  · All management including pain and DVT prophylaxis as per the primary service

## 2019-02-27 LAB
ANION GAP SERPL CALCULATED.3IONS-SCNC: 10 MMOL/L (ref 4–13)
BUN SERPL-MCNC: 15 MG/DL (ref 7–25)
CALCIUM SERPL-MCNC: 9.4 MG/DL (ref 8.6–10.5)
CHLORIDE SERPL-SCNC: 105 MMOL/L (ref 98–107)
CO2 SERPL-SCNC: 23 MMOL/L (ref 21–31)
CREAT SERPL-MCNC: 0.91 MG/DL (ref 0.7–1.3)
GFR SERPL CREATININE-BSD FRML MDRD: 88 ML/MIN/1.73SQ M
GLUCOSE P FAST SERPL-MCNC: 111 MG/DL (ref 65–99)
GLUCOSE SERPL-MCNC: 111 MG/DL (ref 65–99)
HCT VFR BLD AUTO: 33.8 % (ref 42–47)
HGB BLD-MCNC: 11.2 G/DL (ref 14–18)
POTASSIUM SERPL-SCNC: 4 MMOL/L (ref 3.5–5.5)
SODIUM SERPL-SCNC: 138 MMOL/L (ref 134–143)

## 2019-02-27 PROCEDURE — 97163 PT EVAL HIGH COMPLEX 45 MIN: CPT

## 2019-02-27 PROCEDURE — 97110 THERAPEUTIC EXERCISES: CPT

## 2019-02-27 PROCEDURE — 97116 GAIT TRAINING THERAPY: CPT

## 2019-02-27 PROCEDURE — 80048 BASIC METABOLIC PNL TOTAL CA: CPT | Performed by: PHYSICIAN ASSISTANT

## 2019-02-27 PROCEDURE — G8978 MOBILITY CURRENT STATUS: HCPCS

## 2019-02-27 PROCEDURE — 85018 HEMOGLOBIN: CPT | Performed by: PHYSICIAN ASSISTANT

## 2019-02-27 PROCEDURE — G8979 MOBILITY GOAL STATUS: HCPCS

## 2019-02-27 PROCEDURE — 85014 HEMATOCRIT: CPT | Performed by: PHYSICIAN ASSISTANT

## 2019-02-27 PROCEDURE — 97799 UNLISTED PHYSCL MED/REHAB PX: CPT

## 2019-02-27 RX ADMIN — MORPHINE SULFATE 3 MG: 4 INJECTION INTRAVENOUS at 08:01

## 2019-02-27 RX ADMIN — CEFAZOLIN SODIUM 1000 MG: 1 SOLUTION INTRAVENOUS at 08:01

## 2019-02-27 RX ADMIN — OXYCODONE HYDROCHLORIDE AND ACETAMINOPHEN 1 TABLET: 5; 325 TABLET ORAL at 09:56

## 2019-02-27 RX ADMIN — DOCUSATE SODIUM 100 MG: 100 CAPSULE, LIQUID FILLED ORAL at 18:32

## 2019-02-27 RX ADMIN — MORPHINE SULFATE 3 MG: 4 INJECTION INTRAVENOUS at 16:23

## 2019-02-27 RX ADMIN — OXYCODONE HYDROCHLORIDE AND ACETAMINOPHEN 1 TABLET: 5; 325 TABLET ORAL at 14:09

## 2019-02-27 RX ADMIN — MORPHINE SULFATE 3 MG: 4 INJECTION INTRAVENOUS at 20:26

## 2019-02-27 RX ADMIN — OXYCODONE HYDROCHLORIDE AND ACETAMINOPHEN 1 TABLET: 5; 325 TABLET ORAL at 18:32

## 2019-02-27 RX ADMIN — DOCUSATE SODIUM 100 MG: 100 CAPSULE, LIQUID FILLED ORAL at 08:01

## 2019-02-27 RX ADMIN — FONDAPARINUX SODIUM 2.5 MG: 2.5 INJECTION, SOLUTION SUBCUTANEOUS at 08:02

## 2019-02-27 RX ADMIN — DEXTROSE AND SODIUM CHLORIDE 75 ML/HR: 5; 450 INJECTION, SOLUTION INTRAVENOUS at 18:33

## 2019-02-27 RX ADMIN — MORPHINE SULFATE 3 MG: 4 INJECTION INTRAVENOUS at 12:05

## 2019-02-27 RX ADMIN — DEXTROSE AND SODIUM CHLORIDE 75 ML/HR: 5; 450 INJECTION, SOLUTION INTRAVENOUS at 05:36

## 2019-02-27 RX ADMIN — OXYCODONE HYDROCHLORIDE AND ACETAMINOPHEN 1 TABLET: 5; 325 TABLET ORAL at 05:35

## 2019-02-27 RX ADMIN — MORPHINE SULFATE 3 MG: 4 INJECTION INTRAVENOUS at 02:28

## 2019-02-27 NOTE — PLAN OF CARE
Problem: Potential for Falls  Goal: Patient will remain free of falls  Description  INTERVENTIONS:  - Assess patient frequently for physical needs  -  Identify cognitive and physical deficits and behaviors that affect risk of falls    -  Edison fall precautions as indicated by assessment   - Educate patient/family on patient safety including physical limitations  - Instruct patient to call for assistance with activity based on assessment  - Modify environment to reduce risk of injury  - Consider OT/PT consult to assist with strengthening/mobility  Outcome: Progressing     Problem: SAFETY ADULT  Goal: Maintain or return to baseline ADL function  Description  INTERVENTIONS:  -  Assess patient's ability to carry out ADLs; assess patient's baseline for ADL function and identify physical deficits which impact ability to perform ADLs (bathing, care of mouth/teeth, toileting, grooming, dressing, etc )  - Assess/evaluate cause of self-care deficits   - Assess range of motion  - Assess patient's mobility; develop plan if impaired  - Assess patient's need for assistive devices and provide as appropriate  - Encourage maximum independence but intervene and supervise when necessary  ¯ Involve family in performance of ADLs  ¯ Assess for home care needs following discharge   ¯ Request OT consult to assist with ADL evaluation and planning for discharge  ¯ Provide patient education as appropriate  Outcome: Progressing  Goal: Maintain or return mobility status to optimal level  Description  INTERVENTIONS:  - Assess patient's baseline mobility status (ambulation, transfers, stairs, etc )    - Identify cognitive and physical deficits and behaviors that affect mobility  - Identify mobility aids required to assist with transfers and/or ambulation (gait belt, sit-to-stand, lift, walker, cane, etc )  - Edison fall precautions as indicated by assessment  - Record patient progress and toleration of activity level on Mobility SBAR; progress patient to next Phase/Stage  - Instruct patient to call for assistance with activity based on assessment  - Request Rehabilitation consult to assist with strengthening/weightbearing, etc   Outcome: Progressing     Problem: DISCHARGE PLANNING  Goal: Discharge to home or other facility with appropriate resources  Description  INTERVENTIONS:  - Identify barriers to discharge w/patient and caregiver  - Arrange for needed discharge resources and transportation as appropriate  - Identify discharge learning needs (meds, wound care, etc )  - Arrange for interpretive services to assist at discharge as needed  - Refer to Case Management Department for coordinating discharge planning if the patient needs post-hospital services based on physician/advanced practitioner order or complex needs related to functional status, cognitive ability, or social support system  Outcome: Progressing     Problem: Knowledge Deficit  Goal: Patient/family/caregiver demonstrates understanding of disease process, treatment plan, medications, and discharge instructions  Description  Complete learning assessment and assess knowledge base  Interventions:  - Provide teaching at level of understanding  - Provide teaching via preferred learning methods  Outcome: Progressing     Problem: NEUROSENSORY - ADULT  Goal: Achieves stable or improved neurological status  Description  INTERVENTIONS  - Monitor and report changes in neurological status  - Initiate measures to prevent increased intracranial pressure  - Maintain blood pressure and fluid volume within ordered parameters to optimize cerebral perfusion  - Monitor temperature, glucose, and sodium or any other associated labs   Initiate appropriate interventions as ordered  - Monitor for seizure activity   - Administer anti-seizure medications as ordered  Outcome: Progressing  Goal: Absence of seizures  Description  INTERVENTIONS  - Monitor for seizure activity  - Administer anti-seizure medications as ordered  - Monitor neurological status  Outcome: Progressing  Goal: Remains free of injury related to seizures activity  Description  INTERVENTIONS  - Maintain airway, patient safety  and administer oxygen as ordered  - Monitor patient for seizure activity, document and report duration and description of seizure to physician/advanced practitioner  - If seizure occurs,  ensure patient safety during seizure  - Reorient patient post seizure  - Seizure pads on all 4 side rails  - Instruct patient/family to notify RN of any seizure activity including if an aura is experienced  - Instruct patient/family to call for assistance with activity based on nursing assessment  - Administer anti-seizure medications as ordered  - Monitor fetal well being  Outcome: Progressing  Goal: Achieves maximal functionality and self care  Description  INTERVENTIONS  - Monitor swallowing and airway patency with patient fatigue and changes in neurological status  - Encourage and assist patient to increase activity and self care with guidance from rehab services  - Encourage visually impaired, hearing impaired and aphasic patients to use assistive/communication devices  Outcome: Progressing     Problem: GENITOURINARY - ADULT  Goal: Maintains or returns to baseline urinary function  Description  INTERVENTIONS:  - Assess urinary function  - Encourage oral fluids to ensure adequate hydration  - Administer IV fluids as ordered to ensure adequate hydration  - Administer ordered medications as needed  - Offer frequent toileting  - Follow urinary retention protocol if ordered  Outcome: Progressing  Goal: Absence of urinary retention  Description  INTERVENTIONS:  - Assess patient?s ability to void and empty bladder  - Monitor I/O  - Bladder scan as needed  - Discuss with physician/AP medications to alleviate retention as needed  - Discuss catheterization for long term situations as appropriate  Outcome: Progressing  Goal: Urinary catheter remains patent  Description  INTERVENTIONS:  - Assess patency of urinary catheter  - If patient has a chronic duff, consider changing catheter if non-functioning  - Follow guidelines for intermittent irrigation of non-functioning urinary catheter  Outcome: Progressing     Problem: HEMATOLOGIC - ADULT  Goal: Maintains hematologic stability  Description  INTERVENTIONS  - Assess for signs and symptoms of bleeding or hemorrhage  - Monitor labs  - Administer supportive blood products/factors as ordered and appropriate  Outcome: Progressing     Problem: MUSCULOSKELETAL - ADULT  Goal: Maintain or return mobility to safest level of function  Description  INTERVENTIONS:  - Assess patient's ability to carry out ADLs; assess patient's baseline for ADL function and identify physical deficits which impact ability to perform ADLs (bathing, care of mouth/teeth, toileting, grooming, dressing, etc )  - Assess/evaluate cause of self-care deficits   - Assess range of motion  - Assess patient's mobility; develop plan if impaired  - Assess patient's need for assistive devices and provide as appropriate  - Encourage maximum independence but intervene and supervise when necessary  - Involve family in performance of ADLs  - Assess for home care needs following discharge   - Request OT consult to assist with ADL evaluation and planning for discharge  - Provide patient education as appropriate  Outcome: Progressing  Goal: Maintain proper alignment of affected body part  Description  INTERVENTIONS:  - Support, maintain and protect limb and body alignment  - Provide pt/fam with appropriate education  Outcome: Progressing     Problem: Prexisting or High Potential for Compromised Skin Integrity  Goal: Skin integrity is maintained or improved  Description  INTERVENTIONS:  - Identify patients at risk for skin breakdown  - Assess and monitor skin integrity  - Assess and monitor nutrition and hydration status  - Monitor labs (i e  albumin)  - Assess for incontinence   - Turn and reposition patient  - Assist with mobility/ambulation  - Relieve pressure over bony prominences  - Avoid friction and shearing  - Provide appropriate hygiene as needed including keeping skin clean and dry  - Evaluate need for skin moisturizer/barrier cream  - Collaborate with interdisciplinary team (i e  Nutrition, Rehabilitation, etc )   - Patient/family teaching  Outcome: Progressing

## 2019-02-27 NOTE — ANESTHESIA POST-OP FOLLOW-UP NOTE
Patient: Marianne Queen  Procedure(s):  ARTHROPLASTY KNEE TOTAL  Vitals:    02/27/19 0722   BP: 102/67   Pulse: 70   Resp: 18   Temp: 98 5 °F (36 9 °C)   SpO2: 96%     S/P TKR awake alert comfortable  No complaint of nausea  Pain controlled last night  OOB to chair and c/o pain  No other complaints  Fem still alittle weak    Doing well  PO /IV meds PRN pain

## 2019-02-27 NOTE — PHYSICAL THERAPY NOTE
PHYSICAL THERAPY NOTE    Patient Name: Eleonore Boas  EOBOB'F Date: 2/27/2019    CPM adjusted from 0-70 to 0-80 degrees per protocol  PT will continue to monitor and adjust accordingly  Pt tolerated increase in ROM well at this time      Heber Corbett, PT

## 2019-02-27 NOTE — PHYSICAL THERAPY NOTE
Physical Therapy Treatment Note       02/27/19 1025   Pain Assessment   Pain Assessment 0-10   Pain Score 6   Pain Type Surgical pain   Pain Location Knee   Pain Orientation Right   Pain Descriptors Aching; Sharp   Pain Frequency Constant/continuous   Pain Onset Ongoing   Restrictions/Precautions   Weight Bearing Precautions Per Order Yes   RLE Weight Bearing Per Order WBAT   Other Precautions WBS; Multiple lines; Fall Risk;Pain   General   Chart Reviewed Yes   Response to Previous Treatment Patient with no complaints from previous session  Family/Caregiver Present No   Cognition   Overall Cognitive Status WFL   Arousal/Participation Alert; Responsive; Cooperative   Attention Within functional limits   Orientation Level Oriented X4   Memory Within functional limits   Following Commands Follows all commands and directions without difficulty   Comments pt agreeable to PT session   Subjective   Subjective "I want to get back into bed"   Bed Mobility   Sit to Supine 4  Minimal assistance   Additional items Assist x 2;HOB elevated; Bedrails; Increased time required;Verbal cues;LE management   Transfers   Sit to Stand 2  Maximal assistance   Additional items Assist x 2;Armrests; Increased time required;Verbal cues   Stand to Sit 3  Moderate assistance   Additional items Assist x 2;Armrests; Increased time required;Verbal cues   Ambulation/Elevation   Gait pattern Improper Weight shift; Forward Flexion;Decreased foot clearance;Decreased R stance; Antalgic   Gait Assistance 2  Maximal assist   Additional items Assist x 2;Verbal cues; Tactile cues   Assistive Device Rolling walker   Distance 6' from recliner>bed   Balance   Static Sitting Good   Dynamic Sitting Fair   Static Standing Poor +   Dynamic Standing Poor   Ambulatory Poor   Endurance Deficit   Endurance Deficit Yes   Activity Tolerance   Activity Tolerance Patient limited by fatigue;Patient limited by pain   Nurse Made Aware Yes, ALETHEA Christianson verbalized pt appropriate for PT session   Equipment Use   CPM adjusted Right ROM 0-80 degrees   Assessment   Prognosis Good   Problem List Decreased strength;Decreased range of motion;Decreased endurance; Impaired balance;Decreased mobility; Impaired sensation;Orthopedic restrictions;Pain;Decreased skin integrity   Assessment Pt seen for PT treatment session this date with interventions consisting of gait training w/ emphasis on improving pt's ability to ambulate level surfaces x 6 ft with max A of 2 provided by therapist with RW and therapeutic activity consisting of training: supine<>sit transfers, sit<>stand transfers and vc and tactile cues for static standing posture faciliation  Pt agreeable to PT treatment session upon arrival, pt found seated OOB in recliner, in no apparent distress, A&O x 4 and responsive  In comparison to previous session, pt with no improvements as evidenced by pt requiring maximal Ax2 for functional transfer and mobility at this time  Post session: pt returned BTB, all needs in reach and RN notified of session findings/recommendations  Continue to recommend STR at time of d/c in order to maximize pt's functional independence and safety w/ mobility  Pt continues to be functioning below baseline level, and remains limited 2* factors listed above  PT will continue to see pt while here in order to address the deficits listed above and provide interventions consistent w/ POC in effort to achieve STGs  Barriers to Discharge Inaccessible home environment;Decreased caregiver support  (pt lives alone; reports good support system)   Goals   Patient Goals "To return home"   STG Expiration Date 03/09/19   Short Term Goal #1 STGs remain appropriate   Treatment Day 2   Plan   Treatment/Interventions Functional transfer training;LE strengthening/ROM; Elevations; Therapeutic exercise; Endurance training;Patient/family training;Equipment eval/education; Bed mobility;Gait training;Spoke to nursing;Spoke to case management   Progress No functional improvements   PT Frequency Twice a day   Recommendation   Recommendation Short-term skilled PT  (CM made aware of PT recs)   Equipment Recommended Walker; Other (Comment)  (BSC)   PT - OK to Discharge Antonia Sharpe PT    Time of PT treatment session: 9636-9094

## 2019-02-27 NOTE — PLAN OF CARE
Problem: PHYSICAL THERAPY ADULT  Goal: Performs mobility at highest level of function for planned discharge setting  See evaluation for individualized goals  Description  Treatment/Interventions: Functional transfer training, LE strengthening/ROM, Elevations, Therapeutic exercise, Endurance training, Patient/family training, Equipment eval/education, Bed mobility, Gait training, Spoke to nursing, Spoke to case management, OT  Equipment Recommended: Leonard Eubanks, Enio (Comment)([facility] [place]       See flowsheet documentation for full assessment, interventions and recommendations  2/27/2019 1138 by Mili Soliman PT  Outcome: Not Progressing  Note:   Prognosis: Good  Problem List: Decreased strength, Decreased range of motion, Decreased endurance, Impaired balance, Decreased mobility, Impaired sensation, Orthopedic restrictions, Pain, Decreased skin integrity  Assessment: Pt seen for PT treatment session this date with interventions consisting of gait training w/ emphasis on improving pt's ability to ambulate level surfaces x 6 ft with max A of 2 provided by therapist with RW and therapeutic activity consisting of training: supine<>sit transfers, sit<>stand transfers and vc and tactile cues for static standing posture faciliation  Pt agreeable to PT treatment session upon arrival, pt found seated OOB in recliner, in no apparent distress, A&O x 4 and responsive  In comparison to previous session, pt with no improvements as evidenced by pt requiring maximal Ax2 for functional transfer and mobility at this time  Post session: pt returned BTB, all needs in reach and RN notified of session findings/recommendations  Continue to recommend STR at time of d/c in order to maximize pt's functional independence and safety w/ mobility  Pt continues to be functioning below baseline level, and remains limited 2* factors listed above   PT will continue to see pt while here in order to address the deficits listed above and provide interventions consistent w/ POC in effort to achieve STGs  Barriers to Discharge: Inaccessible home environment, Decreased caregiver support(pt lives alone; reports good support system)     Recommendation: (S) Short-term skilled PT(CM made aware of PT recs)     PT - OK to Discharge: No    See flowsheet documentation for full assessment  2/27/2019 1032 by Radha Rodriguez PT  Note:   Prognosis: Good  Problem List: Decreased strength, Decreased range of motion, Decreased endurance, Impaired balance, Decreased mobility, Impaired sensation, Orthopedic restrictions, Pain, Decreased skin integrity  Assessment: Pt is 70 y o  male seen for PT evaluation on 2/27/2019 s/p [facility] [facility] on 2/26/2019 w/ Primary osteoarthritis of right knee  PT consulted to assess pt's functional mobility and d/c needs  Order placed for PT eval and tx, w/ WBAT R LE, activity- beginning POD #0 orders  Pt is POD #1 R TKA  Performed at least 2 patient identifiers during session: Name and wristband  Comorbidities affecting pt's physical performance at time of assessment include: tobacco abuse, arthritis  PTA, pt was independent w/ all functional mobility w/ no AD at baseline, ambulates unrestricted distances and all terrain, has 1 ISMAEL, lives alone in 2 level home and retired  Personal factors affecting pt at time of IE include: lives in 2 story house, ambulating w/ assistive device, stairs to enter home, inability to navigate level surfaces w/o external assistance, unable to perform dynamic tasks in community, limited home support, inability to perform IADLs and inability to perform ADLs   Please find objective findings from PT assessment regarding body systems outlined above with impairments and limitations including weakness, impaired balance, decreased endurance, gait deviations, pain, decreased activity tolerance, decreased functional mobility tolerance, altered sensation, fall risk and orthopedic restrictions, as well as mobility assessment (need for cueing for mobility technique)  The following objective measures performed on IE also reveal limitations: Barthel Index: 50/100  Pt's clinical presentation is currently unstable/unpredictable seen in pt's presentation of increased pain impacting overall mobility status and ongoing medical assessment  Pt to benefit from continued PT tx to address deficits as defined above and maximize level of functional independent mobility and consistency  From PT/mobility standpoint, recommendation at time of d/c would be Home PT vs  STR pending progress in order to facilitate return to PLOF  Barriers to Discharge: Inaccessible home environment, Decreased caregiver support(pt lives alone; reports good support system)     Recommendation: Home PT(vs  STR, pending progress)     PT - OK to Discharge: No    See flowsheet documentation for full assessment

## 2019-02-27 NOTE — PHYSICAL THERAPY NOTE
Physical Therapy Evaluation     Patient's Name: Orlando Body    Admitting Diagnosis  Primary osteoarthritis of right knee [M17 11]    Problem List  Patient Active Problem List   Diagnosis    BPH (benign prostatic hyperplasia)    Tobacco abuse    Primary osteoarthritis of right knee       Past Medical History  Past Medical History:   Diagnosis Date    Arthritis     Rotator cuff injury     left shoulder       Past Surgical History  Past Surgical History:   Procedure Laterality Date    MULTIPLE TOOTH EXTRACTIONS      in office under local    [place] TOTAL KNEE ARTHROPLASTY Right 2/26/2019    Procedure: ARTHROPLASTY KNEE TOTAL;  Surgeon: Trina Nielsen DO;  Location: [facility] MAIN OR;  Service: Orthopedics    TONSILLECTOMY            02/27/19 0800   Note Type   Note type Eval/Treat   Pain Assessment   Pain Assessment 0-10   Pain Score 4   Pain Type Surgical pain   Pain Location Knee   Pain Orientation Right   Pain Descriptors Aching; Sharp   Pain Frequency Constant/continuous   Pain Onset   (since surgery)   Patient's Stated Pain Goal No pain   Hospital Pain Intervention(s) Ambulation/increased activity; Rest;Repositioned;Elevated   Home Living   Type of [facility] Two level;1/2 bath on main level;Bed/bath upstairs;Stairs to enter with rails; Performs ADLs on one level  (1 ISMAEL; FF to 2nd level; 1st floor setup possible)   Bathroom Shower/Tub Tub/shower unit   Bathroom Toilet Standard   Bathroom Equipment Grab bars in shower; Shower chair   [address]   Prior Function   Level of [place] Independent with ADLs and functional mobility   Lives With Alone   Receives Help From Family; Neighbor   ADL Assistance Independent   IADLs Independent   Falls in the last 6 months 0   Vocational Retired   Restrictions/Precautions   [place] Bearing Precautions Per Order Yes   RLE [place] Bearing Per Order WBAT   Other Precautions WBS; Multiple lines; Fall Risk;Pain  (drain) General   Family/Caregiver Present No   Cognition   Overall Cognitive Status WFL   Arousal/Participation Alert   Orientation Level Oriented X4   Memory Within functional limits   Following Commands Follows all commands and directions without difficulty   Comments pt agreeable to PT session   RUE Assessment   RUE Assessment WFL   LUE Assessment   LUE Assessment WFL   RLE Assessment   RLE Assessment X   Strength RLE   R Hip Flexion 2+/5   R Knee Extension 2+/5   R Ankle Dorsiflexion 3-/5   LLE Assessment   LLE Assessment WFL   Coordination   Movements are Fluid and Coordinated 1   Sensation X   Light Touch   RLE Light Touch Impaired   RLE Light Touch Comments dimished in foot compared to L side   LLE Light Touch Grossly intact   Bed Mobility   Rolling R 4  Minimal assistance   Additional items Assist x 2;HOB elevated; Bedrails; Increased time required;Verbal cues   Supine to Sit 4  Minimal assistance   Additional items Assist x 2;HOB elevated; Bedrails; Increased time required;Verbal cues;LE management   Transfers   Sit to Stand 3  Moderate assistance   Additional items Assist x 2; Increased time required;Verbal cues   Stand to Sit 4  Minimal assistance   Additional items Assist x 2;Armrests; Verbal cues   Ambulation/Elevation   Gait pattern Improper Weight shift; Forward Flexion;Decreased foot clearance;Decreased R stance; Antalgic   Gait Assistance 4  Minimal assist   Additional items Assist x 2;Verbal cues; Tactile cues   Assistive Device Rolling walker   Distance 6' from bed>recliner   Balance   Static Sitting Good   Dynamic Sitting Fair +   Static Standing Fair -   Dynamic Standing Fair -   Ambulatory Fair -   Endurance Deficit   Endurance Deficit Yes   Activity Tolerance   Activity Tolerance Patient limited by fatigue;Patient limited by pain   Nurse Made Aware Yes, ALETHEA Brody verbalized pt appropriate for PT session   Assessment   Prognosis Good   Problem List Decreased strength;Decreased range of motion;Decreased endurance; Impaired balance;Decreased mobility; Impaired sensation;Orthopedic restrictions;Pain;Decreased skin integrity   Assessment Pt is 70 y o  male seen for PT evaluation on 2/27/2019 s/p [facility] [address] [facility] on 2/26/2019 w/ Primary osteoarthritis of right knee  PT consulted to assess pt's functional mobility and d/c needs  Order placed for PT eval and tx, w/ WBAT R LE, activity- beginning POD #0 orders  Pt is POD #1 R TKA  Performed at least 2 patient identifiers during session: Name and wristband  Comorbidities affecting pt's physical performance at time of assessment include: tobacco abuse, arthritis  PTA, pt was independent w/ all functional mobility w/ no AD at baseline, ambulates unrestricted distances and all terrain, has 1 ISMAEL, lives alone in 2 level home and retired  Personal factors affecting pt at time of IE include: lives in 2 story house, ambulating w/ assistive device, stairs to enter home, inability to navigate level surfaces w/o external assistance, unable to perform dynamic tasks in community, limited home support, inability to perform IADLs and inability to perform ADLs  Please find objective findings from PT assessment regarding body systems outlined above with impairments and limitations including weakness, impaired balance, decreased endurance, gait deviations, pain, decreased activity tolerance, decreased functional mobility tolerance, altered sensation, fall risk and orthopedic restrictions, as well as mobility assessment (need for cueing for mobility technique)  The following objective measures performed on IE also reveal limitations: Barthel Index: 50/100  Pt's clinical presentation is currently unstable/unpredictable seen in pt's presentation of increased pain impacting overall mobility status and ongoing medical assessment  Pt to benefit from continued PT tx to address deficits as defined above and maximize level of functional independent mobility and consistency   From PT/mobility standpoint, recommendation at time of d/c would be Home PT vs  STR pending progress in order to facilitate return to PLOF  Barriers to Discharge Inaccessible home environment;Decreased caregiver support  (pt lives alone; reports good support system)   Goals   Patient Goals "to return home"   STG Expiration Date 03/09/19   Short Term Goal #1 In 7-10 days: Increase bilateral LE strength 1/2 grade to facilitate independent mobility, Perform all bed mobility tasks modified independent to decrease caregiver burden, Perform all transfers modified independent to improve independence, Ambulate > 100 ft  with least restrictive assistive device modified independent w/o LOB and w/ normalized gait pattern 100% of the time, Navigate 1 stairs with distant S with unilateral handrail to facilitate return to previous living environment, Increase all balance 1/2 grade to decrease risk for falls, Complete exercise program independently and Tolerate 4 hr OOB to faciliate upright tolerance   Treatment Day 1   Plan   Treatment/Interventions Functional transfer training;LE strengthening/ROM; Elevations; Therapeutic exercise; Endurance training;Patient/family training;Equipment eval/education; Bed mobility;Gait training;Spoke to nursing;Spoke to case management;OT   PT Frequency Twice a day   Recommendation   Recommendation Home PT  (vs  STR, pending progress)   Equipment Recommended Walker; Other (Comment)  (BSC)   PT - OK to Discharge No   Additional Comments pt to demonstrate consistency w/ level surface mobility and transfers at MI level of assist and clear steps prior to d/c home; if pt unable to achieve STGs at this time, STR may be warranted   Modified [place] Scale   Modified [place] Scale 4   Barthel Index   Feeding 10   Bathing 0   Grooming Score 5   Dressing Score 5   Bladder Score 10   Bowels Score 10   Toilet Use Score 5   Transfers (Bed/Chair) Score 5   Mobility (Level Surface) Score 0   Stairs Score 0   Barthel Index Score 50     Physical Therapy Treatment Note  Time In: 0800  Time Out: 0825  Total Time: 25 min     S:  Pt agreeable to PT treatment session s/p PT eval, in no apparent distress, A&O x 4 and responsive      O:  Pt seen for PT treatment session this date with interventions consisting of gait training w/ emphasis on improving pt's ability to ambulate level surfaces x 6 ft with min A of 2 provided by therapist with RW, Therapeutic exercise consisting of: AROM 10 reps B LE in sitting position and therapeutic activity consisting of training: sit<>stand transfers and vc and tactile cues for static standing posture faciliation  Minimal increase in pain reported throughout exercise reps  VC required for technique  Corin performed ankle pumps, glute sets, hip flexion on L LE, LAQ on L LE, AAROM LAQ R LE      A:  Post session: pt returned back to recliner, all needs in reach and RN notified of session findings/recommendations     P:  Continue to recommend Home PT vs  STR at time of d/c in order to maximize pt's functional independence and safety w/ mobility  Pt continues to be functioning below baseline level, and remains limited 2* factors listed above  PT will continue to see pt while here in order to address the deficits listed above and provide interventions consistent w/ POC in effort to achieve STGs      April Steven, PT

## 2019-02-27 NOTE — PLAN OF CARE
Problem: PHYSICAL THERAPY ADULT  Goal: Performs mobility at highest level of function for planned discharge setting  See evaluation for individualized goals  Description  Treatment/Interventions: Functional transfer training, LE strengthening/ROM, Elevations, Therapeutic exercise, Endurance training, Patient/family training, Equipment eval/education, Bed mobility, Gait training, Spoke to nursing, Spoke to case management, OT  Equipment Recommended: Farhad Montero, Enio (Comment[place] [place]       See flowsheet documentation for full assessment, interventions and recommendations  Note:   Prognosis: Good  Problem List: Decreased strength, Decreased range of motion, Decreased endurance, Impaired balance, Decreased mobility, Impaired sensation, Orthopedic restrictions, Pain, Decreased skin integrity  Assessment: Pt is 70 y o  male seen for PT evaluation on 2/27/2019 s/p admit to [facility] on 2/26/2019 w/ Primary osteoarthritis of right knee  PT consulted to assess pt's functional mobility and d/c needs  Order placed for PT eval and tx, w/ WBAT R LE, activity- beginning POD #0 orders  Pt is POD #1 R TKA  Performed at least 2 patient identifiers during session: Name and wristband  Comorbidities affecting pt's physical performance at time of assessment include: tobacco abuse, arthritis  PTA, pt was independent w/ all functional mobility w/ no AD at baseline, ambulates unrestricted distances and all terrain, has 1 ISMAEL, lives alone in 2 level home and retired  Personal factors affecting pt at time of IE include: lives in 2 story house, ambulating w/ assistive device, stairs to enter home, inability to navigate level surfaces w/o external assistance, unable to perform dynamic tasks in community, limited home support, inability to perform IADLs and inability to perform ADLs   Please find objective findings from PT assessment regarding body systems outlined above with impairments and limitations including weakness, impaired balance, decreased endurance, gait deviations, pain, decreased activity tolerance, decreased functional mobility tolerance, altered sensation, fall risk and orthopedic restrictions, as well as mobility assessment (need for cueing for mobility technique)  The following objective measures performed on IE also reveal limitations: Barthel Index: 50/100  Pt's clinical presentation is currently unstable/unpredictable seen in pt's presentation of increased pain impacting overall mobility status and ongoing medical assessment  Pt to benefit from continued PT tx to address deficits as defined above and maximize level of functional independent mobility and consistency  From PT/mobility standpoint, recommendation at time of d/c would be Home PT vs  STR pending progress in order to facilitate return to PLOF  Barriers to Discharge: Inaccessible home environment, Decreased caregiver support(pt lives alone; reports good support system)     Recommendation: Home PT(vs  STR, pending progress)     PT - OK to Discharge: No    See flowsheet documentation for full assessment

## 2019-02-27 NOTE — PROGRESS NOTES
Progress Note - Orthopedics   Jim Franklin 70 y o  male MRN: [id number]  Unit/Bed#: -01 Encounter: [id number]    Assessment:  Postop day #1, status post right total knee replacement     Plan:  Out of bed  Weight bearing as tolerated right lower extremity  PT/OT  Arixtra for DVT prophylaxis in the hospital  Discharge planning to home on February 28th or March 1st with home health care, PT  Once to use Ecotrin upon discharge    Weight bearing:  As tolerated    VTE Pharmacologic Prophylaxis: Fondaparinux (Arixtra)  VTE Mechanical Prophylaxis: sequential compression device    Subjective:  Complaining of right knee pain, but lessening    Vitals: Blood pressure 102/67, pulse 70, temperature 98 5 °F (36 9 °C), temperature source Tympanic, resp  rate 18, height 5' 6" (1 676 m), weight 90 7 kg (200 lb), SpO2 96 %  ,Body mass index is 32 28 kg/m²  Intake/Output Summary (Last 24 hours) at 2/27/2019 8107  Last data filed at 2/27/2019 0536  Gross per 24 hour   Intake 1462 5 ml   Output 2700 ml   Net -1237 5 ml       Invasive Devices     Peripheral Intravenous Line            Peripheral IV 02/26/19 Left Wrist 1 day          Drain            Autologus Reinfusion/Drain Device 1 Right Knee 1 day    Closed/Suction Drain Right Knee Accordion 19 Fr  1 day                Physical Exam:  Right lower extremity is neurovascular intact  Toes are pink and mobile  Compartments are soft  Dressing is clean, dry, and intact  There is less return with the drain  Negative Homans  Good dorsiflexion of the foot  Peripheral pulses are intact  Arc of motion is improving      Lab, Imaging and other studies: I have personally reviewed pertinent lab results

## 2019-02-28 LAB
HCT VFR BLD AUTO: 29.8 % (ref 42–47)
HGB BLD-MCNC: 10 G/DL (ref 14–18)

## 2019-02-28 PROCEDURE — G8988 SELF CARE GOAL STATUS: HCPCS

## 2019-02-28 PROCEDURE — 97799 UNLISTED PHYSCL MED/REHAB PX: CPT

## 2019-02-28 PROCEDURE — 85018 HEMOGLOBIN: CPT | Performed by: PHYSICIAN ASSISTANT

## 2019-02-28 PROCEDURE — 97167 OT EVAL HIGH COMPLEX 60 MIN: CPT

## 2019-02-28 PROCEDURE — 85014 HEMATOCRIT: CPT | Performed by: PHYSICIAN ASSISTANT

## 2019-02-28 PROCEDURE — G8987 SELF CARE CURRENT STATUS: HCPCS

## 2019-02-28 PROCEDURE — 97110 THERAPEUTIC EXERCISES: CPT

## 2019-02-28 PROCEDURE — 97116 GAIT TRAINING THERAPY: CPT

## 2019-02-28 RX ADMIN — OXYCODONE HYDROCHLORIDE AND ACETAMINOPHEN 1 TABLET: 5; 325 TABLET ORAL at 21:56

## 2019-02-28 RX ADMIN — OXYCODONE HYDROCHLORIDE AND ACETAMINOPHEN 1 TABLET: 5; 325 TABLET ORAL at 06:10

## 2019-02-28 RX ADMIN — FONDAPARINUX SODIUM 2.5 MG: 2.5 INJECTION, SOLUTION SUBCUTANEOUS at 08:28

## 2019-02-28 RX ADMIN — MORPHINE SULFATE 3 MG: 4 INJECTION INTRAVENOUS at 06:49

## 2019-02-28 RX ADMIN — DOCUSATE SODIUM 100 MG: 100 CAPSULE, LIQUID FILLED ORAL at 17:50

## 2019-02-28 RX ADMIN — OXYCODONE HYDROCHLORIDE AND ACETAMINOPHEN 1 TABLET: 5; 325 TABLET ORAL at 11:47

## 2019-02-28 RX ADMIN — DEXTROSE AND SODIUM CHLORIDE 75 ML/HR: 5; 450 INJECTION, SOLUTION INTRAVENOUS at 06:50

## 2019-02-28 RX ADMIN — MORPHINE SULFATE 3 MG: 4 INJECTION INTRAVENOUS at 15:54

## 2019-02-28 RX ADMIN — DOCUSATE SODIUM 100 MG: 100 CAPSULE, LIQUID FILLED ORAL at 08:28

## 2019-02-28 RX ADMIN — MORPHINE SULFATE 3 MG: 4 INJECTION INTRAVENOUS at 02:16

## 2019-02-28 NOTE — PLAN OF CARE
Problem: PHYSICAL THERAPY ADULT  Goal: Performs mobility at highest level of function for planned discharge setting  See evaluation for individualized goals  Description  Treatment/Interventions: Functional transfer training, LE strengthening/ROM, Elevations, Therapeutic exercise, Endurance training, Patient/family training, Equipment eval/education, Bed mobility, Gait training, Spoke to nursing, Spoke to case management, OT  Equipment Recommended: Keshawn Shelton, Other (Comment)([facility])       See flowsheet documentation for full assessment, interventions and recommendations     2/28/2019 1448 by Laura Chandra, PTA  Outcome: Progressing  2/28/2019 1159 by Laura Chandra, PTA  Outcome: Progressing  2/28/2019 1141 by Laura Nails, PTA  Outcome: Progressing

## 2019-02-28 NOTE — PLAN OF CARE
Problem: PHYSICAL THERAPY ADULT  Goal: Performs mobility at highest level of function for planned discharge setting  See evaluation for individualized goals  Description  Treatment/Interventions: Functional transfer training, LE strengthening/ROM, Elevations, Therapeutic exercise, Endurance training, Patient/family training, Equipment eval/education, Bed mobility, Gait training, Spoke to nursing, Spoke to case management, OT  Equipment Recommended: Enio Sandoval (Comment)([facility])       See flowsheet documentation for full assessment, interventions and recommendations     Outcome: Progressing

## 2019-02-28 NOTE — SOCIAL WORK
Pt discussed during care coordination round s Pt for discharge tomorrow  Home P)T recommended by PT  Pt agreeable and has been accepted by [facility]  Pt aware and in agreement with same  Pt indicates he will be returning to his own home upon discharge and nt his brohters  He reports his brother will assist him with ADL's  CM discussed CPM machine at home and pt declined same  He indicates his brother will transport him home upon discharge  CM will continue to follow

## 2019-02-28 NOTE — PHYSICAL THERAPY NOTE
02/28/19 0948   Pain Assessment   Pain Assessment 0-10   Pain Score 4   Pain Type Surgical pain   Pain Location Knee   Pain Orientation Right   Pain Descriptors Burning   Pain Frequency Constant/continuous   Pain Onset Ongoing   Clinical Progression Gradually improving   Patient's Stated Pain Goal No pain   Hospital Pain Intervention(s) Medication (See MAR); Ambulation/increased activity   Restrictions/Precautions   Weight Bearing Precautions Per Order Yes   RLE Weight Bearing Per Order WBAT   Other Precautions WBS; Multiple lines; Fall Risk;Pain   General   Family/Caregiver Present No   Cognition   Overall Cognitive Status WFL   Arousal/Participation Alert; Cooperative   Attention Within functional limits   Memory Within functional limits   Following Commands Follows all commands and directions without difficulty  (resistant to some directions & physical assistance)   Comments pt agreed to PT treatment-get OOB to amb, to chair   Subjective   Subjective "I'll try to walk a little & get in the chair "   Bed Mobility   Supine to Sit 4  Minimal assistance   Additional items Assist x 2;HOB elevated; Bedrails; Increased time required;Verbal cues;LE management   Transfers   Sit to Stand 3  Moderate assistance   Additional items Assist x 2; Increased time required; Bedrails;Verbal cues   Stand to Sit 4  Minimal assistance   Additional items Assist x 2;Armrests; Increased time required;Verbal cues   Additional Comments pt required mod assist to stand until able to accept weight on BLEs  He did not want staff to touch him to assist with standing & ambulating but was unsafe without assistance  Pt was shaky standing & ambulating using RW   Ambulation/Elevation   Gait pattern Improper Weight shift; Wide BLAKE; Decreased foot clearance;Decreased R stance; Inconsistent mook; Antalgic; Short stride; Step to   Gait Assistance 3  Moderate assist   Additional items Assist x 2;Verbal cues; Tactile cues  (mod x 2 initially then min x 1 when pt did not want assist)   Assistive Device Rolling walker   Distance 8 feet x 1 with turn bed to chair   Stair Management Assistance Not tested   Balance   Static Sitting Good   Dynamic Sitting Fair   Static Standing Poor +   Dynamic Standing Poor   Ambulatory Poor   Endurance Deficit   Endurance Deficit Yes   Endurance Deficit Description fatigues quickly with amb   Activity Tolerance   Activity Tolerance Patient limited by fatigue;Patient limited by pain   Nurse Made Aware yes RN Fabby Sanchez notified   Exercises   Heelslides Sitting;25 reps;AAROM; Right  (using towel on floor to slide)   Hip Flexion Sitting;10 reps;AAROM; Right   Knee AROM Long Arc Quad Sitting;10 reps;AAROM; Right   Equipment Use   CPM adjusted Right ROM 0-90 per protocol   Assessment   Prognosis Good   Problem List Decreased strength;Decreased range of motion;Decreased endurance; Impaired balance;Decreased mobility; Decreased coordination;Decreased safety awareness; Impaired sensation;Decreased skin integrity;Orthopedic restrictions;Pain   Assessment Pt seen for PT treatment session this date with interventions consisting of gait training w/ emphasis on improving pt's ability to ambulate level surfaces x 8 feet x 1 with mod A of 2 provided by therapist with RW  Pt agreeable to PT treatment session upon arrival, pt found supine in bed w/ HOB elevated, in no apparent distress  In comparison to previous session, pt with improvements in amb distance, decreased assistance for transfers/ gait  Post session: all needs in reach OOB in chair, SCDs active  Continue to recommend Home PT at time of d/c in order to maximize pt's functional independence and safety w/ mobility  Pt continues to be functioning below baseline level, and remains limited 2* factors listed above and including decreased strength, ROM, endurance, safety awareness & safe functional mobility   PT will continue to see pt while here in order to address the deficits listed above and provide interventions consistent w/ POC in effort to achieve STGs  Barriers to Discharge Inaccessible home environment;Decreased caregiver support   Goals   Patient Goals to go home tomorrow   Treatment Day 3   Plan   Treatment/Interventions Functional transfer training;LE strengthening/ROM; Elevations; Therapeutic exercise; Endurance training;Patient/family training;Equipment eval/education; Bed mobility;Gait training;Spoke to case management   Progress Improving as expected   PT Frequency 7x/wk; Twice a day   Recommendation   Recommendation Home PT   Equipment Recommended Walker   PT - OK to Discharge No   Additional Comments pt OOB in chair, all needs in reach, SCDs active post session   Jeffrey Estrella, CURTIS

## 2019-02-28 NOTE — UTILIZATION REVIEW
Initial Clinical Review  ELECTIVE OUTPATIENT SURGERY CPT 81074  Age/Sex: 70 y o  male  Surgery Date: 2/26/19  Procedure: ARTHROPLASTY KNEE TOTAL (Right)     Anesthesia: FNBIPACK with spinal    Admission Orders: Date/Time/Statement: OUTPATIENT NO CHARGE BED 2/26/19 @3305  02/26/19 0913 Outpatient No Charge Bed Once     Transfer Service: Orthopedic Surgery       Question: Admitting Physician Answer: Herlinda Blackburn   Start Status    02/26/19 0913 Completed Details       02/26/19 0912       Vital Signs:   02/26/19 0915  97 1 °F (36 2 °C)Abnormal   75  17  119/68  96 %  Nasal cannula      02/27/19 0722  98 5 °F (36 9 °C)  70  18  102/67  96 %  None (Room air)     02/28/19 0804  97 5 °F (36 4 °C)  73  18  163/81  98 %  None (Room air)         Diet:        Diet Orders   (From admission, onward)            Start     Ordered    02/26/19 0913  Diet Regular; Regular House  Diet effective now     Question Answer Comment   Diet Type Regular    Regular Regular House    RD to adjust diet per protocol?  Yes        02/26/19 0912        Mobility: Full wt bearing RLE, cons PT  DVT Prophylaxis: scd's, SQ arixtra daily  Pain Control:   Pain Medications             ibuprofen (MOTRIN) 200 mg tablet Take 600 mg by mouth every 8 (eight) hours as needed for mild pain      IV morphine prn x 3 on 2/26, x 5 on 2/27, x 2 on 2/28  PO percocet prn x 2 on 2/26, x 4 on 2/27, x 2 on 2/28 2/7: h/h 13 9, 41 8  2/27: h/h 11 2, 33 8  2/28: h/h 10, 29 8      Per ortho 2/27, POD1:c/o R knee pain  Out of bed  Weight bearing as tolerated right lower extremity  PT/OT  Arixtra for DVT prophylaxis in the hospital  Discharge planning to home on February 28th or March 1st with home health care, PT  Once to use Ecotrin upon discharge       Per ortho 2/28, POD2:   Continue physical therapy and occupational therapy weight-bearing as tolerated  Leg drain removed  Dressing changed  Continue CPM  Arixtra for DVT prophylaxis  DC home with home health care tomorrow

## 2019-02-28 NOTE — PROGRESS NOTES
Progress Note - Orthopedics   Malini Winchester 70 y o  male MRN: [id number]  Unit/Bed#: -01 Encounter: [id number]    Assessment:  POD 2 s/p Right TKR    Plan:  Continue physical therapy and occupational therapy weight-bearing as tolerated  Leg drain removed  Dressing changed  Continue CPM  Arixtra for DVT prophylaxis  DC home with home health care tomorrow    Weight bearing:  Weightbearing as tolerated    VTE Pharmacologic Prophylaxis: Fondaparinux (Arixtra)  VTE Mechanical Prophylaxis: sequential compression device    Subjective:  No complaints this a m  Vitals: Blood pressure 163/81, pulse 73, temperature 97 5 °F (36 4 °C), temperature source Tympanic, resp  rate 18, height 5' 6" (1 676 m), weight 90 7 kg (200 lb), SpO2 98 %  ,Body mass index is 32 28 kg/m²        Intake/Output Summary (Last 24 hours) at 2/28/2019 0901  Last data filed at 2/28/2019 9153  Gross per 24 hour   Intake 720 ml   Output 700 ml   Net 20 ml       Invasive Devices     Peripheral Intravenous Line            Peripheral IV 02/26/19 Left Wrist 2 days          Drain            Autologus Reinfusion/Drain Device 1 Right Knee 2 days    Closed/Suction Drain Right Knee Accordion 19 Fr  2 days                Physical Exam:  Right lower extremity neurovascularly intact  Toes are pink and mobile  Compartments are soft  Good dorsiflexion and plantar flexion of ankle  Incision is clean, dry and intact  No warmth, erythema or ecchymosis    Lab, Imaging and other studies:   CBC:   Lab Results   Component Value Date    HGB 10 0 (L) 02/28/2019    HCT 29 8 (L) 02/28/2019

## 2019-02-28 NOTE — PLAN OF CARE
Problem: PHYSICAL THERAPY ADULT  Goal: Performs mobility at highest level of function for planned discharge setting  See evaluation for individualized goals  Description  Treatment/Interventions: Functional transfer training, LE strengthening/ROM, Elevations, Therapeutic exercise, Endurance training, Patient/family training, Equipment eval/education, Bed mobility, Gait training, Spoke to nursing, Spoke to case management, OT  Equipment Recommended: Roula Russo, Other (Comment)([facility])       See flowsheet documentation for full assessment, interventions and recommendations     2/28/2019 1159 by Juan Miguel Sanabria PTA  Outcome: Progressing  2/28/2019 1141 by Juan Miguel Sanabria, CURTIS  Outcome: Progressing

## 2019-02-28 NOTE — OCCUPATIONAL THERAPY NOTE
Pt is a 70 y o  male seen for OT evaluation s/p admit to [address] on 2/26/2019 w/ Primary osteoarthritis of right knee  Comorbidities affecting pt's functional performance at time of assessment include: s/p Rt TKR, BPH  Personal factors affecting pt at time of IE include:steps to enter environment, difficulty performing ADLS and difficulty performing IADLS   Prior to admission, pt was I with self care ADL's, IADL's, Ambulation  Upon evaluation: Pt requires an increased level of assistance with self care ADL's, functional transfers/toileting/mobility r/t the following deficits impacting occupational performance: decreased balance, decreased tolerance, decreased safety awareness, increased pain and orthopedic restrictions  Pt to benefit from continued skilled OT tx while in the hospital to address deficits as defined above and maximize level of functional independence w ADL's and functional mobility  Occupational Performance areas to address include: bathing/shower, dressing and functional mobility  From OT standpoint, recommendation at time of d/c would be STR

## 2019-02-28 NOTE — PLAN OF CARE
Problem: OCCUPATIONAL THERAPY ADULT  Goal: Performs self-care activities at highest level of function for planned discharge setting  See evaluation for individualized goals  Description  Treatment Interventions: ADL retraining, Functional transfer training, Endurance training, Patient/family training, Energy conservation, Activityengagement          See flowsheet documentation for full assessment, interventions and recommendations  Note:   Limitation: Decreased ADL status, Decreased Safe judgement during ADL, Decreased endurance, Decreased self-care trans, Decreased high-level ADLs  Prognosis: Good  Assessment: Pt is a 70 y o  male seen for OT evaluation s/p admit to [address] on 2/26/2019 w/ Primary osteoarthritis of right knee  Comorbidities affecting pt's functional performance at time of assessment include: s/p Rt TKR, BPH  Personal factors affecting pt at time of IE include:steps to enter environment, difficulty performing ADLS and difficulty performing IADLS   Prior to admission, pt was I with self care ADL's, IADL's, Ambulation  Upon evaluation: Pt requires an increased level of assistance with self care ADL's, functional transfers/toileting/mobility r/t the following deficits impacting occupational performance: decreased balance, decreased tolerance, decreased safety awareness, increased pain and orthopedic restrictions  Pt to benefit from continued skilled OT tx while in the hospital to address deficits as defined above and maximize level of functional independence w ADL's and functional mobility  Occupational Performance areas to address include: bathing/shower, dressing and functional mobility  From OT standpoint, recommendation at time of d/c would be STR         OT Discharge Recommendation: Short Term Rehab  OT - OK to Discharge: No

## 2019-02-28 NOTE — OCCUPATIONAL THERAPY NOTE
Occupational Therapy Evaluation      [facility]    2/28/2019    Patient Active Problem List   Diagnosis    BPH (benign prostatic hyperplasia)    Tobacco abuse    Primary osteoarthritis of right knee       Past Medical History:   Diagnosis Date    Arthritis     Rotator cuff injury     left shoulder       Past Surgical History:   Procedure Laterality Date    MULTIPLE TOOTH EXTRACTIONS      in office under local    [facility] TOTAL KNEE ARTHROPLASTY Right 2/26/2019    Procedure: ARTHROPLASTY KNEE TOTAL;  Surgeon: Dannie Livingston DO;  Location: [address] [place] [address] MAIN OR;  Service: Orthopedics    TONSILLECTOMY          02/28/19 0928   Note Type   Note type Eval/Treat   Restrictions/Precautions   Weight Bearing Precautions Per Order Yes   RLE Weight Bearing Per Order WBAT   Other Precautions Fall Risk;Pain;WBS   Pain Assessment   Pain Assessment 0-10   Pain Score 4   Pain Type Surgical pain   Pain Location Knee   Pain Orientation Right   Pain Descriptors Aching;Burning   Pain Frequency Constant/continuous   Pain Onset Ongoing   Clinical Progression Gradually improving   Patient's Stated Pain Goal No pain   Home Living   Type of Home House   Home Layout Two level;1/2 bath on main level;Bed/bath upstairs   Bathroom Shower/Tub Tub/shower unit   Bathroom Toilet Standard   Bathroom Equipment Grab bars in shower; Shower chair   [address]   Prior Function   Level of [place] Independent with ADLs and functional mobility   Lives With Alone   Receives Help From Family; Neighbor   ADL Assistance Independent   IADLs Independent   Falls in the last 6 months 0   Vocational Retired   Psychosocial   Psychosocial (WDL) WDL   Subjective   Subjective I could go to the bathroom   ADL   Eating Assistance 7  Independent   Grooming Assistance 5  Supervision/Setup   [address] 3  Moderate [address] 2  [address] 3  Moderate Assistance LB Dressing Assistance 1  Total Assistance   Toileting Assistance  Unable to assess   Bed Mobility   Rolling R 4  Minimal assistance   Additional items Assist x 2;HOB elevated; Increased time required;Verbal cues   Supine to Sit 4  Minimal assistance   Additional items Assist x 2;HOB elevated; Increased time required;Verbal cues;LE management   Transfers   Sit to Stand 3  Moderate assistance   Additional items Assist x 2; Increased time required;Verbal cues; Bedrails   Stand to Sit 4  Minimal assistance   Additional items Assist x 2;Armrests; Verbal cues; Impulsive   Additional Comments pt s/w impulsive/unrealistic/unsafe/needed frequent VC's   Balance   Static Sitting Good   Dynamic Sitting Fair   Static Standing Poor +   Dynamic Standing Poor   Ambulatory Poor   Activity Tolerance   Activity Tolerance Patient limited by fatigue;Patient limited by pain   Nurse Made Aware yes   RUE Assessment   RUE Assessment WFL   LUE Assessment   LUE Assessment WFL   Hand Function   Gross Motor Coordination Functional   Fine Motor Coordination Functional   Cognition   Overall Cognitive Status WFL   Arousal/Participation Alert; Cooperative   Attention Within functional limits   Orientation Level Oriented X4   Memory Within functional limits   Following Commands Follows one step commands without difficulty  (pt s/w reluctant to follow directions at times)   Assessment   Limitation Decreased ADL status; Decreased Safe judgement during ADL;Decreased endurance;Decreased self-care trans;Decreased high-level ADLs   Prognosis Good   Assessment Pt is a 70 y o  male seen for OT evaluation s/p admit to [facility] on 2/26/2019 w/ Primary osteoarthritis of right knee  Comorbidities affecting pt's functional performance at time of assessment include: s/p Rt TKR, BPH  Personal factors affecting pt at time of IE include:steps to enter environment, difficulty performing ADLS and difficulty performing IADLS    Prior to admission, pt was I with self care ADL's, IADL's, Ambulation  Upon evaluation: Pt requires an increased level of assistance with self care ADL's, functional transfers/toileting/mobility r/t the following deficits impacting occupational performance: decreased balance, decreased tolerance, decreased safety awareness, increased pain and orthopedic restrictions  Pt to benefit from continued skilled OT tx while in the hospital to address deficits as defined above and maximize level of functional independence w ADL's and functional mobility  Occupational Performance areas to address include: bathing/shower, dressing and functional mobility  From OT standpoint, recommendation at time of d/c would be STR  Goals   Patient Goals to go home   STG Time Frame 3-5   Short Term Goal #1 increase knowledge of adaptations to good to increase ability to participate in self care ADL's    Short Term Goal #2 increase bed mobility to MI, to prepare for self care ADL's   LTG Time Frame 7-10   Long Term Goal #1 increase functional transfers to Min A /SBA with good safety, to progress to self toileting   Long Term Goal #2 increase self care ADL's to MI   Plan   Treatment Interventions ADL retraining;Functional transfer training; Endurance training;Patient/family training;Energy conservation; Activityengagement   Goal Expiration Date 03/10/19   Treatment Day 0   OT Frequency 5x/wk; Weekend   Recommendation   OT Discharge Recommendation Short Term Rehab   OT - OK to Discharge No   Barthel Index   Feeding 10   Bathing 0   Grooming Score 5   Dressing Score 5   Bladder Score 10   Bowels Score 10   Toilet Use Score 5   Transfers (Bed/Chair) Score 5   Mobility (Level Surface) Score 0   Stairs Score 0   Barthel Index Score 50   Emilie Lambert OT

## 2019-02-28 NOTE — PHYSICAL THERAPY NOTE
02/28/19 0852   Pain Assessment   Pain Assessment 0-10   Pain Score 4   Pain Type Surgical pain   Pain Location Knee   Pain Orientation Right   Pain Descriptors Aching;Burning   Pain Frequency Constant/continuous   Pain Onset Ongoing   Clinical Progression Gradually improving   Patient's Stated Pain Goal No pain   Restrictions/Precautions   Weight Bearing Precautions Per Order Yes   RLE Weight Bearing Per Order WBAT   Other Precautions WBS; Multiple lines; Fall Risk;Pain   General   Chart Reviewed Yes   Family/Caregiver Present No   Cognition   Overall Cognitive Status WFL   Arousal/Participation Alert; Cooperative   Attention Within functional limits   Orientation Level Oriented X4   Memory Within functional limits   Following Commands Follows all commands and directions without difficulty  (resistant to some suggestions & assistance)   Comments pt agreed to PT treatment   Subjective   Subjective "I feel better now since I had my dressing changed & drain taken out  Pain is 4/10 now after meds "   Endurance Deficit   Endurance Deficit Yes   Activity Tolerance   Activity Tolerance Patient limited by fatigue;Patient limited by pain   Exercises   Quad Sets Supine;20 reps;AROM; Right   Heelslides Supine;10 reps;AAROM; Right   Glute Sets Supine;20 reps;AROM; Bilateral   Hip Abduction Supine;20 reps;AAROM; Right   Hip Adduction Supine;20 reps;AAROM; Right   Ankle Pumps Supine;25 reps;AROM; Bilateral   Assessment   Prognosis Good   Problem List Decreased strength;Decreased range of motion;Decreased endurance; Impaired balance;Decreased mobility; Impaired sensation;Decreased skin integrity;Orthopedic restrictions;Pain   Assessment Pt seen for PT treatment session this date with interventions consisting of Therapeutic exercise consisting of: AROM and AAROM 10-25 reps B LE and emphasis on RLE in supine position  Pt agreeable to PT treatment session upon arrival, pt found supine in bed w/ HOB elevated, in no apparent distress   In comparison to previous session, pt with improvements in ex tolerance, decreased pain  Post session: all needs in reach, SCDs active, pt educated on importance of extending RLE vs keeping it flexed to prevent inability to fully extend LE post surgery  Pt provided with handouts for exercises to perform as home program  Continue to recommend Home PT at time of d/c in order to maximize pt's functional independence and safety w/ mobility  Pt continues to be functioning below baseline level, and remains limited 2* factors listed above and including decreased strength, ROM, endurance & safe functional mobility  PT will continue to see pt while here in order to address the deficits listed above and provide interventions consistent w/ POC in effort to achieve STGs  Barriers to Discharge Inaccessible home environment;Decreased caregiver support   Goals   Patient Goals to go home tomorrow   Treatment Day 3   Plan   Treatment/Interventions Functional transfer training;LE strengthening/ROM; Elevations; Therapeutic exercise; Endurance training;Patient/family training;Equipment eval/education; Bed mobility;Gait training;Spoke to nursing;Spoke to case management   Progress Improving as expected   PT Frequency 7x/wk; Twice a day   Recommendation   Recommendation Home PT   Equipment Recommended Walker   PT - OK to Discharge No   Additional Comments pt in bed all needs in reach, SCDs active post session   Merceda Jump, PTA

## 2019-02-28 NOTE — PLAN OF CARE
Problem: Potential for Falls  Goal: Patient will remain free of falls  Description  INTERVENTIONS:  - Assess patient frequently for physical needs  -  Identify cognitive and physical deficits and behaviors that affect risk of falls    -  Causey fall precautions as indicated by assessment   - Educate patient/family on patient safety including physical limitations  - Instruct patient to call for assistance with activity based on assessment  - Modify environment to reduce risk of injury  - Consider OT/PT consult to assist with strengthening/mobility  Outcome: Progressing     Problem: SAFETY ADULT  Goal: Maintain or return to baseline ADL function  Description  INTERVENTIONS:  -  Assess patient's ability to carry out ADLs; assess patient's baseline for ADL function and identify physical deficits which impact ability to perform ADLs (bathing, care of mouth/teeth, toileting, grooming, dressing, etc )  - Assess/evaluate cause of self-care deficits   - Assess range of motion  - Assess patient's mobility; develop plan if impaired  - Assess patient's need for assistive devices and provide as appropriate  - Encourage maximum independence but intervene and supervise when necessary  ¯ Involve family in performance of ADLs  ¯ Assess for home care needs following discharge   ¯ Request OT consult to assist with ADL evaluation and planning for discharge  ¯ Provide patient education as appropriate  Outcome: Progressing

## 2019-03-01 VITALS
HEART RATE: 86 BPM | BODY MASS INDEX: 32.14 KG/M2 | WEIGHT: 200 LBS | HEIGHT: 66 IN | DIASTOLIC BLOOD PRESSURE: 60 MMHG | TEMPERATURE: 99.2 F | SYSTOLIC BLOOD PRESSURE: 102 MMHG | RESPIRATION RATE: 18 BRPM | OXYGEN SATURATION: 95 %

## 2019-03-01 LAB
HCT VFR BLD AUTO: 29.6 % (ref 42–47)
HGB BLD-MCNC: 10 G/DL (ref 14–18)

## 2019-03-01 PROCEDURE — 97530 THERAPEUTIC ACTIVITIES: CPT

## 2019-03-01 PROCEDURE — 97110 THERAPEUTIC EXERCISES: CPT

## 2019-03-01 PROCEDURE — 85014 HEMATOCRIT: CPT | Performed by: PHYSICIAN ASSISTANT

## 2019-03-01 PROCEDURE — 85018 HEMOGLOBIN: CPT | Performed by: PHYSICIAN ASSISTANT

## 2019-03-01 RX ORDER — ASPIRIN 325 MG
325 TABLET, DELAYED RELEASE (ENTERIC COATED) ORAL 2 TIMES DAILY
Qty: 30 TABLET | Refills: 0 | Status: ON HOLD
Start: 2019-03-01 | End: 2021-01-13

## 2019-03-01 RX ORDER — FERROUS SULFATE TAB EC 324 MG (65 MG FE EQUIVALENT) 324 (65 FE) MG
324 TABLET DELAYED RESPONSE ORAL
Refills: 0 | Status: ON HOLD
Start: 2019-03-01 | End: 2022-05-16 | Stop reason: ALTCHOICE

## 2019-03-01 RX ORDER — OXYCODONE HYDROCHLORIDE AND ACETAMINOPHEN 5; 325 MG/1; MG/1
1 TABLET ORAL EVERY 4 HOURS PRN
Refills: 0
Start: 2019-03-01 | End: 2019-03-11

## 2019-03-01 RX ORDER — DOCUSATE SODIUM 100 MG/1
100 CAPSULE, LIQUID FILLED ORAL 2 TIMES DAILY
Qty: 10 CAPSULE | Refills: 0 | Status: ON HOLD
Start: 2019-03-01 | End: 2021-01-13

## 2019-03-01 RX ORDER — CEPHALEXIN 500 MG/1
500 CAPSULE ORAL EVERY 8 HOURS SCHEDULED
Refills: 0
Start: 2019-03-01 | End: 2019-03-06

## 2019-03-01 RX ADMIN — DOCUSATE SODIUM 100 MG: 100 CAPSULE, LIQUID FILLED ORAL at 08:56

## 2019-03-01 RX ADMIN — FONDAPARINUX SODIUM 2.5 MG: 2.5 INJECTION, SOLUTION SUBCUTANEOUS at 08:56

## 2019-03-01 RX ADMIN — OXYCODONE HYDROCHLORIDE AND ACETAMINOPHEN 1 TABLET: 5; 325 TABLET ORAL at 08:56

## 2019-03-01 NOTE — PHYSICAL THERAPY NOTE
03/01/19 0858   Pain Assessment   Pain Assessment 0-10   Pain Score 3   Pain Type Surgical pain   Pain Location Knee   Pain Orientation Right   Pain Descriptors Burning   Pain Frequency Constant/continuous   Pain Onset Ongoing   Clinical Progression Gradually improving   Patient's Stated Pain Goal No pain   Hospital Pain Intervention(s) Medication (See MAR); Ambulation/increased activity   Multiple Pain Sites Yes  (pain 7/10 R hip & LB)   Restrictions/Precautions   Weight Bearing Precautions Per Order Yes   RLE Weight Bearing Per Order WBAT   Other Precautions WBS; Fall Risk;Pain   General   Chart Reviewed Yes   Family/Caregiver Present No   Cognition   Overall Cognitive Status WFL   Arousal/Participation Alert; Cooperative   Attention Within functional limits   Orientation Level Oriented X4   Memory Within functional limits   Following Commands Follows all commands and directions without difficulty   Comments pt agreed to PT treatment   Subjective   Subjective "I'll try to get up  My R LB & hip hurt from being in this bed 7/10 "   Bed Mobility   Supine to Sit 4  Minimal assistance   Additional items Assist x 1;Bedrails; Increased time required;Verbal cues   Additional Comments sat at EOB x about 20 mins total between transfers supine to sit, attempts to stand   Transfers   Sit to Stand 3  Moderate assistance   Additional items Assist x 1;Bedrails; Increased time required;Verbal cues  (2 trials, pt unable to take steps 1st attempt)   Stand to Sit 4  Minimal assistance   Additional items Assist x 1; Armrests; Increased time required;Verbal cues   Stand pivot 3  Moderate assistance   Additional items Assist x 1; Armrests; Increased time required;Verbal cues   Additional Comments stood with RW min-mod x 1 x 1 min unable to take steps 1st attempt, sat again for 5 mins tried to stand without AD unsuccessfully to pivot to chair, Stood with RW mod x 1 & took 4 steps to chair mod x 1    Ambulation/Elevation   Gait pattern Improper Weight shift; Forward Flexion; Wide BLAKE; Decreased foot clearance; Inconsistent mook; Short stride; Step to;Excessively slow; Antalgic   Gait Assistance 3  Moderate assist   Additional items Assist x 1;Verbal cues; Tactile cues   Assistive Device Rolling walker   Distance 4 steps bed to chair   Stair Management Assistance Not tested   Balance   Static Sitting Good   Dynamic Sitting Fair   Static Standing Fair -   Dynamic Standing Poor   Ambulatory Poor   Endurance Deficit   Endurance Deficit Yes   Endurance Deficit Description fatigues quickly with activity   Activity Tolerance   Activity Tolerance Patient limited by fatigue;Patient limited by pain   Nurse Made Aware yes ALETHEA Mccallum notified   Exercises   Quad Sets Supine;20 reps;AROM; Right   Heelslides Supine;10 reps;AAROM; Right   Glute Sets Supine;20 reps;AROM; Bilateral   Hip Abduction Supine;20 reps;AAROM; Right   Hip Adduction Supine;20 reps;AAROM; Right   Ankle Pumps Supine;25 reps;AROM; Bilateral   Assessment   Prognosis Good   Problem List Decreased strength;Decreased range of motion;Decreased endurance; Impaired balance;Decreased mobility; Decreased coordination;Decreased safety awareness; Impaired sensation;Decreased skin integrity;Orthopedic restrictions;Pain   Assessment Pt seen for PT treatment session this date with interventions consisting of Therapeutic exercise consisting of: AROM and AAROM 10-25 reps B LE and emphasis on RLE in supine position and therapeutic activity consisting of training: supine<>sit transfers, sit<>stand transfers, static sitting tolerance at EOB for 20 total minutes w/ B UE support, static standing tolerance for 2 x 2 minutes w/ B UE support and stand pivot transfer bed to chair with RW  Pt agreeable to PT treatment session upon arrival, pt found supine in bed w/ HOB elevated, in no apparent distress   In comparison to previous session, pt with no improvements as evidenced by decreased tolerance for activity/amb due to increased c/o pain, pt declined stair training stating he knows how to do it already  Post session: all needs in reach, seated in bedside chair, SCDs active  Continue to recommend Home PT at time of d/c in order to maximize pt's functional independence and safety w/ mobility  Pt continues to be functioning below baseline level, and remains limited 2* factors listed above and including decreased strength, endurance & safe functional mobility  PT will continue to see pt while here in order to address the deficits listed above and provide interventions consistent w/ POC in effort to achieve STGs  Barriers to Discharge Inaccessible home environment;Decreased caregiver support   Goals   Patient Goals to go home   Treatment Day 4   Plan   Treatment/Interventions Functional transfer training;LE strengthening/ROM; Elevations; Therapeutic exercise; Endurance training;Patient/family training;Equipment eval/education; Bed mobility;Gait training;Spoke to nursing;Spoke to case management   Progress Slow progress, decreased activity tolerance   PT Frequency 7x/wk; Twice a day   Recommendation   Recommendation Home PT  (pt refusing STR)   Equipment Recommended Walker  (pt fitted with & provided with RW for home use)   PT - OK to Discharge Yes  (when med cleared)   Additional Comments pt OOB in chair, all needs in reach SCDs active post session   Dorota Mancuso, PTA

## 2019-03-01 NOTE — UTILIZATION REVIEW
OUTPATIENT NO CHARGE BED 2/26/19 @ 0912 CONVERTED TO INPATIENT ADMISSION 2/28/19 @ 2152 DUE TO >2 MN STAY REQUIRED TO CARE FOR PT WITH TKR AND POST OP PAIN NEEDING IV MORPHINE     02/28/19 2152  Inpatient Admission Once     Transfer Service: Orthopedic Surgery       Question Answer Comment   Admitting Physician QUINCY ALVARENGA    Level of Care Med Surg    Estimated length of stay More than 2 Midnights    Certification I certify that inpatient services are medically necessary for this patient for a duration of greater than two midnights  See H&P and MD Progress Notes for additional information about the patient's course of treatment         Start Status    02/28/19 2152 Completed Details       02/28/19 2152

## 2019-03-01 NOTE — PROGRESS NOTES
Progress Note - Orthopedics   Pawel Reis 70 y o  male MRN: [id number]  Unit/Bed#: -01 Encounter: [id number]    Assessment:  POD 3 s/p R TKR    Plan:  Continue PT OT weight-bearing as tolerated  DC home with [address] today  Change Arixtra to  mg b i d  For home DVT prophylaxis    Weight bearing: WBAT    VTE Pharmacologic Prophylaxis: Fondaparinux (Arixtra)  VTE Mechanical Prophylaxis: sequential compression device    Subjective:  No complaints wants to go home    Vitals: Blood pressure 102/60, pulse 86, temperature 99 2 °F (37 3 °C), temperature source Tympanic, resp  rate 18, height 5' 6" (1 676 m), weight 90 7 kg (200 lb), SpO2 95 %  ,Body mass index is 32 28 kg/m²        Intake/Output Summary (Last 24 hours) at 3/1/2019 0933  Last data filed at 3/1/2019 0044  Gross per 24 hour   Intake --   Output 800 ml   Net -800 ml       Invasive Devices     Peripheral Intravenous Line            Peripheral IV 02/26/19 Left Wrist 3 days                Physical Exam:   Right lower extremity neurovascularly intact  Compartments are soft  Toes are pink and mobile  No warmth, erythema or ecchymosis  Good dorsiflexion plantar flexion of ankle  Dressing is clean, dry and intact    Lab, Imaging and other studies:   CBC:   Lab Results   Component Value Date    HGB 10 0 (L) 03/01/2019    HCT 29 6 (L) 03/01/2019

## 2019-03-01 NOTE — PLAN OF CARE
Problem: PHYSICAL THERAPY ADULT  Goal: Performs mobility at highest level of function for planned discharge setting  See evaluation for individualized goals  Description  Treatment/Interventions: Functional transfer training, LE strengthening/ROM, Elevations, Therapeutic exercise, Endurance training, Patient/family training, Equipment eval/education, Bed mobility, Gait training, Spoke to nursing, Spoke to case management, OT  Equipment Recommended: Majo Tavera, Enio (Comment)(BSC)       See flowsheet documentation for full assessment, interventions and recommendations      3/1/2019 1254 by Ruddy Davis PTA  Outcome: Not Progressing  3/1/2019 1254 by Ruddy Davis PTA  Reactivated

## 2019-03-01 NOTE — PHYSICIAN ADVISOR
Current patient class: Outpatient Surgery  The patient is currently on Hospital Day: 3 at [address]      The patient was admitted to the hospital at N/A on N/A for the following diagnosis:  Primary osteoarthritis of right knee [M17 11]       There is documentation in the medical record of an expected length of stay of at least 2 midnights  The patient is therefore expected to satisfy the 2 midnight benchmark and given the 2 midnight presumption is appropriate for INPATIENT ADMISSION  Given this expectation of a satisfying stay, CMS instructs us that the patient is most often appropriate for inpatient admission under part A provided medical necessity is documented in the chart  After review of the relevant documentation, labs, vital signs and test results, the patient is appropriate for INPATIENT ADMISSION  Admission to the hospital as an inpatient is a complex decision making process which requires the practitioner to consider the patients presenting complaint, history and physical examination and all relevant testing  With this in mind, in this case, the patient was deemed appropriate for INPATIENT ADMISSION  After review of the documentation and testing available at the time of the admission I concur with this clinical determination of medical necessity  Rationale is as follows: The patient is a 70 yrs old Male who presented to the ED at 2/26/2019  4:53 AM with a chief complaint of TKA  Given the need for further hospitalization, and along with the documentation of medical necessity present in the chart, the patient is appropriate for inpatient admission  The patient is expected to satisfy the 2 midnight benchmark, and will require further acute medical care  The patient does have comorbid conditions which increases the risk for significant adverse outcome  Given this the patient is appropriate for inpatient admission      This patient underwent a procedure not on the inpatient only list and therefore is subject to the 2 midnight benchmark  Accordingly, in my judgement, the patient will require at least 2 midnights in the hospital receiving acute medical care  The patient is noted to have comorbid conditions which will require management in the clau-operative period prior to safe discharge  As such the patient will require acute care beyond the usual and routine recovery period for the procedure alone and is therefore appropriate for inpatient admission        The patients vitals on arrival were ED Triage Vitals   Temperature Pulse Respirations Blood Pressure SpO2   02/26/19 0514 02/26/19 0514 02/26/19 0514 02/26/19 0514 02/26/19 0514   98 7 °F (37 1 °C) 98 20 118/82 98 %      Temp Source Heart Rate Source Patient Position - Orthostatic VS BP Location FiO2 (%)   02/26/19 0514 02/26/19 0514 02/26/19 1135 02/26/19 1030 --   Temporal Monitor Lying Left arm       Pain Score       02/26/19 0514       6           Past Medical History:   Diagnosis Date    Arthritis     Rotator cuff injury     left shoulder     Past Surgical History:   Procedure Laterality Date    MULTIPLE TOOTH EXTRACTIONS      in office under local   [facility] Right 2/26/2019    Procedure: ARTHROPLASTY KNEE TOTAL;  Surgeon: Ray Persaud DO;  Location: [address] [place] [address] OR;  Service: Orthopedics    TONSILLECTOMY             Consults have been placed to:   IP CONSULT TO INTERNAL MEDICINE  IP CONSULT TO CASE MANAGEMENT    Vitals:    02/27/19 0722 02/27/19 1621 02/28/19 0804 02/28/19 1524   BP: 102/67 142/70 163/81 110/62   BP Location: Right arm Right arm Left arm Right arm   Pulse: 70 89 73 93   Resp: 18 18 18 18   Temp: 98 5 °F (36 9 °C) 98 8 °F (37 1 °C) 97 5 °F (36 4 °C) 98 6 °F (37 °C)   TempSrc: Tympanic Temporal Tympanic Temporal   SpO2: 96% 96% 98% 93%   Weight:       Height:           Most recent labs:    Recent Labs     02/27/19  0539 02/28/19  0612   HGB 11 2* 10 0*   HCT 33 8* 29 8*   K 4 0  --    CALCIUM 9 4  --    BUN 15  --    CREATININE 0 91  --        Scheduled Meds:  Current Facility-Administered Medications:  acetaminophen 650 mg Oral Q4H PRN Valjean Dieelida, PA-C    dextrose 5 % and sodium chloride 0 45 % 75 mL/hr Intravenous Continuous Valjean Dienes, PA-C Last Rate: 75 mL/hr (02/28/19 0650)   docusate sodium 100 mg Oral BID Valjean Dieelida, PA-C    fondaparinux 2 5 mg Subcutaneous Daily Valjean Dienes, PA-C    morphine injection 3 mg Intravenous Q4H PRN Valjean Dienes, PA-C    oxyCODONE-acetaminophen 1 tablet Oral Q4H PRN Valjean Dieelida, PA-C      Continuous Infusions:  dextrose 5 % and sodium chloride 0 45 % 75 mL/hr Last Rate: 75 mL/hr (02/28/19 0650)     PRN Meds:   acetaminophen    morphine injection    oxyCODONE-acetaminophen    Surgical procedures (if appropriate):  Procedure(s):  ARTHROPLASTY KNEE TOTAL

## 2019-03-01 NOTE — DISCHARGE SUMMARY
Discharge Summary - Chang Garcia 70 y o  male MRN: [id number]    Unit/Bed#: -01 Encounter: [id number]    Admission Date:   Admission Orders (From admission, onward)    Ordered        02/28/19 2152  Inpatient Admission  Once     Order ID Start Status   [id number] 02/28/19 2152 Completed                Admitting Diagnosis: Primary osteoarthritis of right knee [M17 11]        Procedures Performed:  Elective right total knee replacement    Summary of Hospital Course:   69-year-old male presents to the hospital for an elective right total knee replacement  The patient tolerated the procedure well  On postop day 1, the patient's hemoglobin was stable and he was able to dorsiflex and plantar flex his ankle without issue  He was started on a CPM machine  He was also started on Arixtra for DVT prophylaxis  On postop day 2 his dressing was changed and his leg drain was removed  His incision was clean, dry and intact  He continued to work with Physical therapy and Occupational therapy weight-bearing as tolerated  He was then deemed suitable for discharge to home with home health care on 03/01/2019  The patient was discharged with antibiotics, pain medication, vitamins, DVT prophylaxis, and also an elevated commode, walker, and CPM machine  CPM machine instructions 0-90 degrees, increase 10 degrees per day; 4 hrs on and 4 hrs off  The patient was instructed to paint incision with betadine three times per day  Discharge Diagnosis:  Primary osteoarthritis of right knee    Resolved Problems  Date Reviewed: 2/26/2019    None          Condition at Discharge: stable         Discharge instructions/Information to patient and family:   See after visit summary for information provided to patient and family  Provisions for Follow-Up Care:  See after visit summary for information related to follow-up care and any pertinent home health orders        PCP: Gia Waller MD    Disposition: Home    Planned Readmission: No    Discharge Statement   I spent 30 minutes discharging the patient  This time was spent on the day of discharge  I had direct contact with the patient on the day of discharge  Additional documentation is required if more than 30 minutes were spent on discharge  Discharge Medications:  See after visit summary for reconciled discharge medications provided to patient and family

## 2019-03-01 NOTE — SOCIAL WORK
Pt for discharge today  Pt adamant he does not want want STR  Pt wants to return to his home with follow up from [facility]  CM faxed AVS to [facility] for follow up  They will start care 3/3  Pt was given walker by PT  Pt declined the need for CPM and elevated commode  He indicates his brother will transport him home this afternoon and help him with ADL's as needed

## 2019-04-02 ENCOUNTER — EVALUATION (OUTPATIENT)
Dept: PHYSICAL THERAPY | Facility: CLINIC | Age: 64
End: 2019-04-02
Payer: MEDICARE

## 2019-04-02 ENCOUNTER — TRANSCRIBE ORDERS (OUTPATIENT)
Dept: PHYSICAL THERAPY | Facility: CLINIC | Age: 64
End: 2019-04-02

## 2019-04-02 DIAGNOSIS — M17.11 PRIMARY OSTEOARTHRITIS OF RIGHT KNEE: Primary | ICD-10-CM

## 2019-04-02 DIAGNOSIS — Z96.651 STATUS POST TOTAL RIGHT KNEE REPLACEMENT: ICD-10-CM

## 2019-04-02 PROCEDURE — 97162 PT EVAL MOD COMPLEX 30 MIN: CPT | Performed by: PHYSICAL THERAPIST

## 2019-04-02 PROCEDURE — 97110 THERAPEUTIC EXERCISES: CPT | Performed by: PHYSICAL THERAPIST

## 2019-04-02 PROCEDURE — 97140 MANUAL THERAPY 1/> REGIONS: CPT | Performed by: PHYSICAL THERAPIST

## 2019-04-04 ENCOUNTER — OFFICE VISIT (OUTPATIENT)
Dept: PHYSICAL THERAPY | Facility: CLINIC | Age: 64
End: 2019-04-04
Payer: MEDICARE

## 2019-04-04 DIAGNOSIS — M17.11 PRIMARY OSTEOARTHRITIS OF RIGHT KNEE: Primary | ICD-10-CM

## 2019-04-04 DIAGNOSIS — Z96.651 STATUS POST TOTAL RIGHT KNEE REPLACEMENT: ICD-10-CM

## 2019-04-04 PROCEDURE — 97140 MANUAL THERAPY 1/> REGIONS: CPT | Performed by: PHYSICAL THERAPIST

## 2019-04-04 PROCEDURE — 97110 THERAPEUTIC EXERCISES: CPT | Performed by: PHYSICAL THERAPIST

## 2019-04-09 ENCOUNTER — OFFICE VISIT (OUTPATIENT)
Dept: PHYSICAL THERAPY | Facility: CLINIC | Age: 64
End: 2019-04-09
Payer: MEDICARE

## 2019-04-09 DIAGNOSIS — M17.11 PRIMARY OSTEOARTHRITIS OF RIGHT KNEE: Primary | ICD-10-CM

## 2019-04-09 DIAGNOSIS — Z96.651 STATUS POST TOTAL RIGHT KNEE REPLACEMENT: ICD-10-CM

## 2019-04-09 PROCEDURE — 97140 MANUAL THERAPY 1/> REGIONS: CPT

## 2019-04-09 PROCEDURE — 97110 THERAPEUTIC EXERCISES: CPT

## 2019-04-11 ENCOUNTER — OFFICE VISIT (OUTPATIENT)
Dept: PHYSICAL THERAPY | Facility: CLINIC | Age: 64
End: 2019-04-11
Payer: MEDICARE

## 2019-04-11 DIAGNOSIS — Z96.651 STATUS POST TOTAL RIGHT KNEE REPLACEMENT: ICD-10-CM

## 2019-04-11 DIAGNOSIS — M17.11 PRIMARY OSTEOARTHRITIS OF RIGHT KNEE: Primary | ICD-10-CM

## 2019-04-11 PROCEDURE — 97140 MANUAL THERAPY 1/> REGIONS: CPT | Performed by: PHYSICAL THERAPIST

## 2019-04-11 PROCEDURE — 97110 THERAPEUTIC EXERCISES: CPT | Performed by: PHYSICAL THERAPIST

## 2019-04-16 ENCOUNTER — OFFICE VISIT (OUTPATIENT)
Dept: PHYSICAL THERAPY | Facility: CLINIC | Age: 64
End: 2019-04-16
Payer: MEDICARE

## 2019-04-16 DIAGNOSIS — M17.11 PRIMARY OSTEOARTHRITIS OF RIGHT KNEE: Primary | ICD-10-CM

## 2019-04-16 DIAGNOSIS — Z96.651 STATUS POST TOTAL RIGHT KNEE REPLACEMENT: ICD-10-CM

## 2019-04-16 PROCEDURE — 97110 THERAPEUTIC EXERCISES: CPT

## 2019-04-16 PROCEDURE — 97140 MANUAL THERAPY 1/> REGIONS: CPT

## 2019-04-18 ENCOUNTER — OFFICE VISIT (OUTPATIENT)
Dept: PHYSICAL THERAPY | Facility: CLINIC | Age: 64
End: 2019-04-18
Payer: MEDICARE

## 2019-04-18 DIAGNOSIS — M17.11 PRIMARY OSTEOARTHRITIS OF RIGHT KNEE: Primary | ICD-10-CM

## 2019-04-18 DIAGNOSIS — Z96.651 STATUS POST TOTAL RIGHT KNEE REPLACEMENT: ICD-10-CM

## 2019-04-18 PROCEDURE — 97110 THERAPEUTIC EXERCISES: CPT | Performed by: PHYSICAL THERAPIST

## 2019-04-18 PROCEDURE — 97140 MANUAL THERAPY 1/> REGIONS: CPT | Performed by: PHYSICAL THERAPIST

## 2019-04-23 ENCOUNTER — OFFICE VISIT (OUTPATIENT)
Dept: PHYSICAL THERAPY | Facility: CLINIC | Age: 64
End: 2019-04-23
Payer: MEDICARE

## 2019-04-23 DIAGNOSIS — M17.11 PRIMARY OSTEOARTHRITIS OF RIGHT KNEE: Primary | ICD-10-CM

## 2019-04-23 DIAGNOSIS — Z96.651 STATUS POST TOTAL RIGHT KNEE REPLACEMENT: ICD-10-CM

## 2019-04-23 PROCEDURE — 97140 MANUAL THERAPY 1/> REGIONS: CPT

## 2019-04-23 PROCEDURE — 97110 THERAPEUTIC EXERCISES: CPT

## 2019-04-25 ENCOUNTER — OFFICE VISIT (OUTPATIENT)
Dept: PHYSICAL THERAPY | Facility: CLINIC | Age: 64
End: 2019-04-25
Payer: MEDICARE

## 2019-04-25 DIAGNOSIS — M17.11 PRIMARY OSTEOARTHRITIS OF RIGHT KNEE: Primary | ICD-10-CM

## 2019-04-25 DIAGNOSIS — Z96.651 STATUS POST TOTAL RIGHT KNEE REPLACEMENT: ICD-10-CM

## 2019-04-25 PROCEDURE — 97110 THERAPEUTIC EXERCISES: CPT | Performed by: PHYSICAL THERAPIST

## 2019-04-25 PROCEDURE — 97140 MANUAL THERAPY 1/> REGIONS: CPT | Performed by: PHYSICAL THERAPIST

## 2019-05-09 ENCOUNTER — OFFICE VISIT (OUTPATIENT)
Dept: PHYSICAL THERAPY | Facility: CLINIC | Age: 64
End: 2019-05-09
Payer: MEDICARE

## 2019-05-09 ENCOUNTER — TRANSCRIBE ORDERS (OUTPATIENT)
Dept: PHYSICAL THERAPY | Facility: CLINIC | Age: 64
End: 2019-05-09

## 2019-05-09 DIAGNOSIS — M17.11 PRIMARY OSTEOARTHRITIS OF RIGHT KNEE: Primary | ICD-10-CM

## 2019-05-09 DIAGNOSIS — Z96.651 STATUS POST TOTAL RIGHT KNEE REPLACEMENT: ICD-10-CM

## 2019-05-09 PROCEDURE — 97140 MANUAL THERAPY 1/> REGIONS: CPT | Performed by: PHYSICAL THERAPIST

## 2019-05-09 PROCEDURE — 97110 THERAPEUTIC EXERCISES: CPT | Performed by: PHYSICAL THERAPIST

## 2019-05-14 ENCOUNTER — OFFICE VISIT (OUTPATIENT)
Dept: PHYSICAL THERAPY | Facility: CLINIC | Age: 64
End: 2019-05-14
Payer: MEDICARE

## 2019-05-14 DIAGNOSIS — Z96.651 STATUS POST TOTAL RIGHT KNEE REPLACEMENT: ICD-10-CM

## 2019-05-14 DIAGNOSIS — M17.11 PRIMARY OSTEOARTHRITIS OF RIGHT KNEE: Primary | ICD-10-CM

## 2019-05-14 PROCEDURE — 97140 MANUAL THERAPY 1/> REGIONS: CPT

## 2019-05-14 PROCEDURE — 97110 THERAPEUTIC EXERCISES: CPT

## 2019-05-16 ENCOUNTER — OFFICE VISIT (OUTPATIENT)
Dept: PHYSICAL THERAPY | Facility: CLINIC | Age: 64
End: 2019-05-16
Payer: MEDICARE

## 2019-05-16 DIAGNOSIS — Z96.651 STATUS POST TOTAL RIGHT KNEE REPLACEMENT: ICD-10-CM

## 2019-05-16 DIAGNOSIS — M17.11 PRIMARY OSTEOARTHRITIS OF RIGHT KNEE: Primary | ICD-10-CM

## 2019-05-16 PROCEDURE — 97110 THERAPEUTIC EXERCISES: CPT

## 2019-05-16 PROCEDURE — 97140 MANUAL THERAPY 1/> REGIONS: CPT

## 2020-10-05 ENCOUNTER — OFFICE VISIT (OUTPATIENT)
Dept: OBGYN CLINIC | Facility: CLINIC | Age: 65
End: 2020-10-05
Payer: MEDICARE

## 2020-10-05 VITALS
DIASTOLIC BLOOD PRESSURE: 64 MMHG | WEIGHT: 195.8 LBS | HEIGHT: 66 IN | BODY MASS INDEX: 31.47 KG/M2 | SYSTOLIC BLOOD PRESSURE: 120 MMHG | TEMPERATURE: 97.4 F

## 2020-10-05 DIAGNOSIS — Z01.812 PRE-OPERATIVE LABORATORY EXAMINATION: ICD-10-CM

## 2020-10-05 DIAGNOSIS — Z11.59 SPECIAL SCREENING EXAMINATION FOR UNSPECIFIED VIRAL DISEASE: ICD-10-CM

## 2020-10-05 DIAGNOSIS — M17.12 OSTEOARTHRITIS OF LEFT KNEE, UNSPECIFIED OSTEOARTHRITIS TYPE: Primary | ICD-10-CM

## 2020-10-05 PROCEDURE — 20610 DRAIN/INJ JOINT/BURSA W/O US: CPT | Performed by: ORTHOPAEDIC SURGERY

## 2020-10-05 PROCEDURE — 99213 OFFICE O/P EST LOW 20 MIN: CPT | Performed by: ORTHOPAEDIC SURGERY

## 2020-10-05 RX ORDER — FOLIC ACID 1 MG/1
1 TABLET ORAL DAILY
Qty: 30 TABLET | Refills: 1 | Status: ON HOLD | OUTPATIENT
Start: 2020-12-13 | End: 2021-01-13

## 2020-10-05 RX ORDER — METHYLPREDNISOLONE ACETATE 40 MG/ML
2 INJECTION, SUSPENSION INTRA-ARTICULAR; INTRALESIONAL; INTRAMUSCULAR; SOFT TISSUE
Status: COMPLETED | OUTPATIENT
Start: 2020-10-05 | End: 2020-10-05

## 2020-10-05 RX ORDER — ASCORBIC ACID 500 MG
500 TABLET ORAL 2 TIMES DAILY
Qty: 60 TABLET | Refills: 1 | Status: SHIPPED | OUTPATIENT
Start: 2020-12-13 | End: 2022-05-12

## 2020-10-05 RX ORDER — FERROUS SULFATE TAB EC 324 MG (65 MG FE EQUIVALENT) 324 (65 FE) MG
324 TABLET DELAYED RESPONSE ORAL
Qty: 60 TABLET | Refills: 1 | Status: ON HOLD | OUTPATIENT
Start: 2020-12-13 | End: 2021-01-13

## 2020-10-05 RX ORDER — CHLORHEXIDINE GLUCONATE 4 G/100ML
SOLUTION TOPICAL DAILY PRN
Status: CANCELLED | OUTPATIENT
Start: 2021-01-13

## 2020-10-05 RX ORDER — CEFAZOLIN SODIUM 2 G/50ML
2000 SOLUTION INTRAVENOUS ONCE
Status: CANCELLED | OUTPATIENT
Start: 2021-01-13 | End: 2020-10-05

## 2020-10-05 RX ORDER — MULTIVIT-MIN/IRON FUM/FOLIC AC 7.5 MG-4
1 TABLET ORAL DAILY
Qty: 30 TABLET | Refills: 1 | Status: ON HOLD | OUTPATIENT
Start: 2020-12-13 | End: 2022-05-16 | Stop reason: ALTCHOICE

## 2020-10-05 RX ORDER — BUPIVACAINE HYDROCHLORIDE 2.5 MG/ML
4 INJECTION, SOLUTION INFILTRATION; PERINEURAL
Status: COMPLETED | OUTPATIENT
Start: 2020-10-05 | End: 2020-10-05

## 2020-10-05 RX ORDER — CHLORHEXIDINE GLUCONATE 0.12 MG/ML
15 RINSE ORAL ONCE
Status: CANCELLED | OUTPATIENT
Start: 2021-01-13 | End: 2020-10-05

## 2020-10-05 RX ORDER — IBUPROFEN 200 MG
TABLET ORAL EVERY 6 HOURS PRN
Status: ON HOLD | COMMUNITY
End: 2021-01-13

## 2020-10-05 RX ADMIN — METHYLPREDNISOLONE ACETATE 2 ML: 40 INJECTION, SUSPENSION INTRA-ARTICULAR; INTRALESIONAL; INTRAMUSCULAR; SOFT TISSUE at 15:04

## 2020-10-05 RX ADMIN — BUPIVACAINE HYDROCHLORIDE 4 ML: 2.5 INJECTION, SOLUTION INFILTRATION; PERINEURAL at 15:04

## 2020-12-18 ENCOUNTER — TELEPHONE (OUTPATIENT)
Dept: OBGYN CLINIC | Facility: HOSPITAL | Age: 65
End: 2020-12-18

## 2020-12-18 NOTE — PRE-PROCEDURE INSTRUCTIONS
Pre-Surgery Instructions:   Medication Instructions    ascorbic acid (VITAMIN C) 500 MG tablet Instructed patient per Anesthesia Guidelines  LD 1/12/21    ferrous sulfate 324 (65 Fe) mg Instructed patient per Anesthesia Guidelines  LD 7/23/27    folic acid (FOLVITE) 1 mg tablet Instructed patient per Anesthesia Guidelines  LD 1/12/21    ibuprofen (MOTRIN) 200 mg tablet Instructed patient per Anesthesia Guidelines  LD 1/12/21    Multiple Vitamins-Minerals (multivitamin with minerals) tablet Instructed patient per Anesthesia Guidelines  LD 1/12/21    pseudoephedrine (SUDAFED) 30 mg tablet Instructed patient per Anesthesia Guidelines  LD 1/12/21       My Surgical Experience    The following information was developed to assist you to prepare for your operation  What do I need to do before coming to the hospital?   Arrange for a responsible person to drive you to and from the hospital    Arrange care for your children at home  Children are not allowed in the recovery areas of the hospital   Plan to wear clothing that is easy to put on and take off  If you are having shoulder surgery, wear a shirt that buttons or zippers in the front  Bathing  o Shower the evening before and the morning of your surgery with an antibacterial soap  Please refer to the Pre Op Showering Instructions for Surgery Patients Sheet   o Remove nail polish and all body piercing jewelry  o Do not shave any body part for at least 24 hours before surgery-this includes face, arms, legs and upper body  Food  o Nothing to eat or drink after midnight the night before your surgery   This includes candy and chewing gum  o Exception: If your surgery is after 12:00pm (noon), you may have clear liquids such as 7-Up®, ginger ale, apple or cranberry juice, Jell-O®, water, or clear broth until 8:00 am  o Do not drink milk or juice with pulp on the morning before surgery  o Do not drink alcohol 24 hours before surgery  Medicine  o Follow instructions you received from your surgeon about which medicines you may take on the day of surgery  o If instructed to take medicine on the morning of surgery, take pills with just a small sip of water  Call your prescribing doctor for specific infroamtion on what to do if you take insulin    What should I bring to the hospital?    Bring:  Nahomi Acosta or a walker, if you have them, for foot or knee surgery   A list of the daily medicines, vitamins, minerals, herbals and nutritional supplements you take  Include the dosages of medicines and the time you take them each day   Glasses, dentures or hearing aids   Minimal clothing; you will be wearing hospital sleepwear   Photo ID; required to verify your identity   If you have a Living Will or Power of , bring a copy of the documents   If you have an ostomy, bring an extra pouch and any supplies you use    Do not bring   Medicines or inhalers   Money, valuables or jewelry    What other information should I know about the day of surgery?  Notify your surgeons if you develop a cold, sore throat, cough, fever, rash or any other illness   Report to the Ambulatory Surgical/Same Day Surgery Unit   You will be instructed to stop at Registration only if you have not been pre-registered   Inform your  fi they do not stay that they will be asked by the staff to leave a phone number where they can be reached   Be available to be reached before surgery  In the event the operating room schedule changes, you may be asked to come in earlier or later than expected    *It is important to tell your doctor and others involved in your health care if you are taking or have been taking any non-prescription drugs, vitamins, minerals, herbals or other nutritional supplements   Any of these may interact with some food or medicines and cause a reaction

## 2020-12-21 ENCOUNTER — EVALUATION (OUTPATIENT)
Dept: PHYSICAL THERAPY | Facility: CLINIC | Age: 65
End: 2020-12-21
Payer: MEDICARE

## 2020-12-21 DIAGNOSIS — Z01.812 PRE-OPERATIVE LABORATORY EXAMINATION: ICD-10-CM

## 2020-12-21 DIAGNOSIS — M17.12 OSTEOARTHRITIS OF LEFT KNEE, UNSPECIFIED OSTEOARTHRITIS TYPE: Primary | ICD-10-CM

## 2020-12-21 PROCEDURE — 97110 THERAPEUTIC EXERCISES: CPT | Performed by: PHYSICAL THERAPIST

## 2020-12-21 PROCEDURE — 97161 PT EVAL LOW COMPLEX 20 MIN: CPT | Performed by: PHYSICAL THERAPIST

## 2020-12-29 ENCOUNTER — OFFICE VISIT (OUTPATIENT)
Dept: LAB | Facility: HOSPITAL | Age: 65
End: 2020-12-29
Attending: PHYSICIAN ASSISTANT
Payer: MEDICARE

## 2020-12-29 ENCOUNTER — TRANSCRIBE ORDERS (OUTPATIENT)
Dept: LAB | Facility: HOSPITAL | Age: 65
End: 2020-12-29

## 2020-12-29 ENCOUNTER — HOSPITAL ENCOUNTER (OUTPATIENT)
Dept: RADIOLOGY | Facility: HOSPITAL | Age: 65
Discharge: HOME | End: 2020-12-29
Attending: PHYSICIAN ASSISTANT
Payer: MEDICARE

## 2020-12-29 DIAGNOSIS — Z01.812 PRE-OPERATIVE LABORATORY EXAMINATION: ICD-10-CM

## 2020-12-29 LAB
ABO GROUP BLD: NORMAL
ALBUMIN SERPL BCP-MCNC: 4.2 G/DL (ref 3.5–5.7)
ALP SERPL-CCNC: 51 U/L (ref 55–165)
ALT SERPL W P-5'-P-CCNC: 12 U/L (ref 7–52)
ANION GAP SERPL CALCULATED.3IONS-SCNC: 11 MMOL/L (ref 4–13)
APTT PPP: 34 SECONDS (ref 23–37)
AST SERPL W P-5'-P-CCNC: 13 U/L (ref 13–39)
ATRIAL RATE: 75 BPM
BASOPHILS # BLD AUTO: 0.1 THOUSANDS/ΜL (ref 0–0.1)
BASOPHILS NFR BLD AUTO: 1 % (ref 0–2)
BILIRUB SERPL-MCNC: 0.1 MG/DL (ref 0.2–1)
BLD GP AB SCN SERPL QL: NEGATIVE
BUN SERPL-MCNC: 21 MG/DL (ref 7–25)
CALCIUM SERPL-MCNC: 9.3 MG/DL (ref 8.6–10.5)
CHLORIDE SERPL-SCNC: 106 MMOL/L (ref 98–107)
CO2 SERPL-SCNC: 20 MMOL/L (ref 21–31)
CREAT SERPL-MCNC: 1.09 MG/DL (ref 0.7–1.3)
CRP SERPL QL: 15.9 MG/L
EOSINOPHIL # BLD AUTO: 0.4 THOUSAND/ΜL (ref 0–0.61)
EOSINOPHIL NFR BLD AUTO: 5 % (ref 0–5)
ERYTHROCYTE [DISTWIDTH] IN BLOOD BY AUTOMATED COUNT: 15.9 % (ref 11.5–14.5)
EST. AVERAGE GLUCOSE BLD GHB EST-MCNC: 103 MG/DL
FERRITIN SERPL-MCNC: 258 NG/ML (ref 8–388)
GFR SERPL CREATININE-BSD FRML MDRD: 70 ML/MIN/1.73SQ M
GLUCOSE P FAST SERPL-MCNC: 102 MG/DL (ref 65–99)
HBA1C MFR BLD: 5.2 %
HCT VFR BLD AUTO: 38.9 % (ref 42–47)
HGB BLD-MCNC: 12.6 G/DL (ref 14–18)
INR PPP: 1.02 (ref 0.84–1.19)
IRON SATN MFR SERPL: 17 %
IRON SERPL-MCNC: 53 UG/DL (ref 65–175)
LYMPHOCYTES # BLD AUTO: 2.3 THOUSANDS/ΜL (ref 0.6–4.47)
LYMPHOCYTES NFR BLD AUTO: 30 % (ref 21–51)
MCH RBC QN AUTO: 28.7 PG (ref 26–34)
MCHC RBC AUTO-ENTMCNC: 32.4 G/DL (ref 31–37)
MCV RBC AUTO: 89 FL (ref 81–99)
MONOCYTES # BLD AUTO: 0.6 THOUSAND/ΜL (ref 0.17–1.22)
MONOCYTES NFR BLD AUTO: 8 % (ref 2–12)
NEUTROPHILS # BLD AUTO: 4.5 THOUSANDS/ΜL (ref 1.4–6.5)
NEUTS SEG NFR BLD AUTO: 57 % (ref 42–75)
P AXIS: 60 DEGREES
PLATELET # BLD AUTO: 361 THOUSANDS/UL (ref 149–390)
PMV BLD AUTO: 7.8 FL (ref 8.6–11.7)
POTASSIUM SERPL-SCNC: 4.2 MMOL/L (ref 3.5–5.5)
PR INTERVAL: 186 MS
PROT SERPL-MCNC: 7.6 G/DL (ref 6.4–8.9)
PROTHROMBIN TIME: 13.3 SECONDS (ref 11.6–14.5)
QRS AXIS: -4 DEGREES
QRSD INTERVAL: 72 MS
QT INTERVAL: 380 MS
QTC INTERVAL: 424 MS
RBC # BLD AUTO: 4.39 MILLION/UL (ref 4.3–5.9)
RH BLD: POSITIVE
SODIUM SERPL-SCNC: 137 MMOL/L (ref 134–143)
SPECIMEN EXPIRATION DATE: NORMAL
T WAVE AXIS: 54 DEGREES
TIBC SERPL-MCNC: 313 UG/DL (ref 250–450)
VENTRICULAR RATE: 75 BPM
WBC # BLD AUTO: 7.9 THOUSAND/UL (ref 4.8–10.8)

## 2020-12-29 PROCEDURE — 86140 C-REACTIVE PROTEIN: CPT

## 2020-12-29 PROCEDURE — 85610 PROTHROMBIN TIME: CPT

## 2020-12-29 PROCEDURE — 86901 BLOOD TYPING SEROLOGIC RH(D): CPT

## 2020-12-29 PROCEDURE — 85025 COMPLETE CBC W/AUTO DIFF WBC: CPT

## 2020-12-29 PROCEDURE — 83036 HEMOGLOBIN GLYCOSYLATED A1C: CPT

## 2020-12-29 PROCEDURE — 80053 COMPREHEN METABOLIC PANEL: CPT

## 2020-12-29 PROCEDURE — 85730 THROMBOPLASTIN TIME PARTIAL: CPT

## 2020-12-29 PROCEDURE — 86900 BLOOD TYPING SEROLOGIC ABO: CPT

## 2020-12-29 PROCEDURE — 86850 RBC ANTIBODY SCREEN: CPT

## 2020-12-29 PROCEDURE — 82728 ASSAY OF FERRITIN: CPT

## 2020-12-29 PROCEDURE — 71046 X-RAY EXAM CHEST 2 VIEWS: CPT

## 2020-12-29 PROCEDURE — 83550 IRON BINDING TEST: CPT

## 2020-12-29 PROCEDURE — 83540 ASSAY OF IRON: CPT

## 2020-12-29 PROCEDURE — 36415 COLL VENOUS BLD VENIPUNCTURE: CPT

## 2021-01-05 DIAGNOSIS — Z11.59 SPECIAL SCREENING EXAMINATION FOR UNSPECIFIED VIRAL DISEASE: ICD-10-CM

## 2021-01-05 PROCEDURE — U0003 INFECTIOUS AGENT DETECTION BY NUCLEIC ACID (DNA OR RNA); SEVERE ACUTE RESPIRATORY SYNDROME CORONAVIRUS 2 (SARS-COV-2) (CORONAVIRUS DISEASE [COVID-19]), AMPLIFIED PROBE TECHNIQUE, MAKING USE OF HIGH THROUGHPUT TECHNOLOGIES AS DESCRIBED BY CMS-2020-01-R: HCPCS | Performed by: PHYSICIAN ASSISTANT

## 2021-01-06 LAB — SARS-COV-2 RNA SPEC QL NAA+PROBE: NOT DETECTED

## 2021-01-07 ENCOUNTER — TELEPHONE (OUTPATIENT)
Dept: OBGYN CLINIC | Facility: HOSPITAL | Age: 66
End: 2021-01-07

## 2021-01-07 NOTE — TELEPHONE ENCOUNTER
Patient is calling to ask the results of his COVID test and he would also know if he is able to take Tylenol  Patient is scheduled for a total knee on 01/13/21  Please advise

## 2021-01-12 ENCOUNTER — ANESTHESIA EVENT (OUTPATIENT)
Dept: PERIOP | Facility: HOSPITAL | Age: 66
DRG: 470 | End: 2021-01-12
Payer: MEDICARE

## 2021-01-12 PROCEDURE — NC001 PR NO CHARGE: Performed by: ORTHOPAEDIC SURGERY

## 2021-01-12 NOTE — H&P
H&P Exam - Orthopedics   Power Varela 77 y o  male MRN: [id number]  Unit/Bed#:  Encounter: [id number]    Assessment/Plan     Assessment:  Primary osteoarthritis left knee  Plan:  Elective left total knee replacement    History of Present Illness   HPI:  Power Varela is a 77 y o  male who presented to our office complaining of worsening left knee pain  The patient states that his pain is starting to affect his quality of life  X-rays were performed which showed advanced arthritic changes  After exhausting conservative treatment, the risks and benefits of surgery were discussed with the patient  He decided on an elective left total knee replacement  Review of Systems   Constitutional: Negative for chills, fever and unexpected weight change  HENT: Negative for hearing loss, nosebleeds and sore throat  Eyes: Negative for pain, redness and visual disturbance  Respiratory: Negative for cough, shortness of breath and wheezing  Cardiovascular: Negative for chest pain, palpitations and leg swelling  Gastrointestinal: Negative for abdominal pain, nausea and vomiting  Endocrine: Negative for polydipsia and polyuria  Genitourinary: Negative for dysuria and hematuria  Musculoskeletal: Positive for arthralgias, gait problem and joint swelling  As noted in HPI   Skin: Negative for rash and wound  Neurological: Negative for dizziness, numbness and headaches  Psychiatric/Behavioral: Negative for decreased concentration and suicidal ideas  The patient is not nervous/anxious          Historical Information   Past Medical History:   Diagnosis Date    Arthritis     Rotator cuff injury     left shoulder     Past Surgical History:   Procedure Laterality Date    JOINT REPLACEMENT Right 2018    MULTIPLE TOOTH EXTRACTIONS      in office under local    [place] TOTAL KNEE ARTHROPLASTY Right 2/26/2019    Procedure: ARTHROPLASTY KNEE TOTAL;  Surgeon: Siva Klein DO;  [facility] MAIN OR;  Service: Orthopedics    TONSILLECTOMY       Social History   Social History     Substance and Sexual Activity   Alcohol Use Yes    Alcohol/week: 4 0 standard drinks    Types: 3 Cans of beer, 1 Shots of liquor per week    Comment: 4-6 drinks a week     Social History     Substance and Sexual Activity   Drug Use Never     Social History     Tobacco Use   Smoking Status Light Tobacco Smoker    Packs/day: 0 25    Types: Cigarettes   Smokeless Tobacco Current User   Tobacco Comment    2 packs a week     Family History: non-contributory    Meds/Allergies   all medications and allergies reviewed  No Known Allergies    Objective   Vitals: There were no vitals taken for this visit  ,There is no height or weight on file to calculate BMI  No intake or output data in the 24 hours ending 01/12/21 1323    No intake/output data recorded  Invasive Devices     None                 Physical Exam  Ortho Exam  Left lower extremity is neurovascularly intact  Toes are pink and mobile   Compartments are soft  No warmth, erythema or ecchymosis  ROM of knee is from 5-115 degrees  Negative Lachman, drawer or pivot shift  No medial instability  Medial joint line tenderness, slight lateral joint line tenderness  Patellofemoral crepitation    Physical Exam   Constitutional: Appears well-developed and well-nourished  No distress  HENT:   Head: Normocephalic  Eyes: Conjunctivae are normal  Right eye exhibits no discharge  Left eye exhibits no discharge  No scleral icterus  Cardiovascular: Normal rate  Pulmonary/Chest: Effort normal    Neurological: Alert and oriented to person, place, and time  Skin: Skin is warm and dry  No rash noted  The patient is not diaphoretic  No erythema  No pallor  Psychiatric: Normal mood and affect  Behavior is normal  Judgment and thought content normal    Lab Results: I have personally reviewed pertinent lab results  Imaging: I have personally reviewed pertinent reports      EKG, Pathology, and Other Studies: I have personally reviewed pertinent reports  Code Status: No Order  Advance Directive and Living Will:      Power of :    POLST:      Counseling / Coordination of Care  Total floor / unit time spent today 30 minutes  Greater than 50% of total time was spent with the patient and / or family counseling and / or coordination of care

## 2021-01-12 NOTE — ANESTHESIA PREPROCEDURE EVALUATION
Procedure:  KNEE TOTAL ARTHROPLASTY (Left Knee)    Relevant Problems   /RENAL   (+) BPH (benign prostatic hyperplasia)      MUSCULOSKELETAL   (+) Primary osteoarthritis of right knee        Physical Exam    Airway    Mallampati score: II  TM Distance: >3 FB  Neck ROM: full     Dental       Cardiovascular  Cardiovascular exam normal    Pulmonary  Pulmonary exam normal     Other Findings        Anesthesia Plan  ASA Score- 2     Anesthesia Type- spinal and regional with ASA Monitors  Additional Monitors:   Airway Plan:           Plan Factors-Exercise tolerance (METS): >4 METS  Chart reviewed  EKG reviewed  Imaging results reviewed  Existing labs reviewed  Patient summary reviewed  Patient is a current smoker  Patient instructed to abstain from smoking on day of procedure  Induction- intravenous  Postoperative Plan-     Informed Consent- Anesthetic plan and risks discussed with patient  I personally reviewed this patient with the CRNA  Discussed and agreed on the Anesthesia Plan with the CRNA  Kehinde Wright

## 2021-01-13 ENCOUNTER — ANESTHESIA (OUTPATIENT)
Dept: PERIOP | Facility: HOSPITAL | Age: 66
DRG: 470 | End: 2021-01-13
Payer: MEDICARE

## 2021-01-13 ENCOUNTER — APPOINTMENT (OUTPATIENT)
Dept: RADIOLOGY | Facility: HOSPITAL | Age: 66
DRG: 470 | End: 2021-01-13
Payer: MEDICARE

## 2021-01-13 ENCOUNTER — HOSPITAL ENCOUNTER (INPATIENT)
Facility: HOSPITAL | Age: 66
LOS: 5 days | Discharge: HOME HEALTH | DRG: 470 | End: 2021-01-18
Attending: ORTHOPAEDIC SURGERY | Admitting: ORTHOPAEDIC SURGERY
Payer: MEDICARE

## 2021-01-13 VITALS — HEART RATE: 84 BPM

## 2021-01-13 DIAGNOSIS — M17.11 PRIMARY OSTEOARTHRITIS OF RIGHT KNEE: ICD-10-CM

## 2021-01-13 DIAGNOSIS — M17.12 OSTEOARTHRITIS OF LEFT KNEE, UNSPECIFIED OSTEOARTHRITIS TYPE: Primary | ICD-10-CM

## 2021-01-13 PROCEDURE — 97166 OT EVAL MOD COMPLEX 45 MIN: CPT

## 2021-01-13 PROCEDURE — C1776 JOINT DEVICE (IMPLANTABLE): HCPCS | Performed by: ORTHOPAEDIC SURGERY

## 2021-01-13 PROCEDURE — 88305 TISSUE EXAM BY PATHOLOGIST: CPT | Performed by: PATHOLOGY

## 2021-01-13 PROCEDURE — 27447 TOTAL KNEE ARTHROPLASTY: CPT | Performed by: PHYSICIAN ASSISTANT

## 2021-01-13 PROCEDURE — 97163 PT EVAL HIGH COMPLEX 45 MIN: CPT

## 2021-01-13 PROCEDURE — 0SRD0J9 REPLACEMENT OF LEFT KNEE JOINT WITH SYNTHETIC SUBSTITUTE, CEMENTED, OPEN APPROACH: ICD-10-PCS | Performed by: ORTHOPAEDIC SURGERY

## 2021-01-13 PROCEDURE — 27447 TOTAL KNEE ARTHROPLASTY: CPT | Performed by: ORTHOPAEDIC SURGERY

## 2021-01-13 PROCEDURE — 88311 DECALCIFY TISSUE: CPT | Performed by: PATHOLOGY

## 2021-01-13 PROCEDURE — 97110 THERAPEUTIC EXERCISES: CPT

## 2021-01-13 PROCEDURE — C1713 ANCHOR/SCREW BN/BN,TIS/BN: HCPCS | Performed by: ORTHOPAEDIC SURGERY

## 2021-01-13 PROCEDURE — C9290 INJ, BUPIVACAINE LIPOSOME: HCPCS | Performed by: ANESTHESIOLOGY

## 2021-01-13 PROCEDURE — 73560 X-RAY EXAM OF KNEE 1 OR 2: CPT

## 2021-01-13 PROCEDURE — G9197 ORDER FOR CEPH: HCPCS | Performed by: ORTHOPAEDIC SURGERY

## 2021-01-13 PROCEDURE — 99221 1ST HOSP IP/OBS SF/LOW 40: CPT | Performed by: NURSE PRACTITIONER

## 2021-01-13 DEVICE — IMPLANTABLE DEVICE: Type: IMPLANTABLE DEVICE | Site: KNEE | Status: FUNCTIONAL

## 2021-01-13 DEVICE — IMPLANTABLE DEVICE
Type: IMPLANTABLE DEVICE | Site: KNEE | Status: FUNCTIONAL
Brand: NEXGEN® LEGACY®

## 2021-01-13 DEVICE — IMPLANTABLE DEVICE
Type: IMPLANTABLE DEVICE | Site: KNEE | Status: FUNCTIONAL
Brand: NEXGEN®

## 2021-01-13 RX ORDER — ACETAMINOPHEN 325 MG/1
650 TABLET ORAL EVERY 4 HOURS PRN
Status: DISCONTINUED | OUTPATIENT
Start: 2021-01-13 | End: 2021-01-18 | Stop reason: HOSPADM

## 2021-01-13 RX ORDER — DOCUSATE SODIUM 100 MG/1
100 CAPSULE, LIQUID FILLED ORAL 2 TIMES DAILY
Status: DISCONTINUED | OUTPATIENT
Start: 2021-01-13 | End: 2021-01-18 | Stop reason: HOSPADM

## 2021-01-13 RX ORDER — BUPIVACAINE HYDROCHLORIDE 2.5 MG/ML
INJECTION, SOLUTION EPIDURAL; INFILTRATION; INTRACAUDAL
Status: COMPLETED | OUTPATIENT
Start: 2021-01-13 | End: 2021-01-13

## 2021-01-13 RX ORDER — MORPHINE SULFATE 4 MG/ML
3 INJECTION, SOLUTION INTRAMUSCULAR; INTRAVENOUS EVERY 4 HOURS PRN
Status: DISCONTINUED | OUTPATIENT
Start: 2021-01-13 | End: 2021-01-14

## 2021-01-13 RX ORDER — BUPIVACAINE HYDROCHLORIDE 7.5 MG/ML
INJECTION, SOLUTION INTRASPINAL AS NEEDED
Status: DISCONTINUED | OUTPATIENT
Start: 2021-01-13 | End: 2021-01-13

## 2021-01-13 RX ORDER — OXYCODONE HYDROCHLORIDE AND ACETAMINOPHEN 5; 325 MG/1; MG/1
1 TABLET ORAL EVERY 4 HOURS PRN
Status: DISCONTINUED | OUTPATIENT
Start: 2021-01-13 | End: 2021-01-18 | Stop reason: HOSPADM

## 2021-01-13 RX ORDER — FOLIC ACID 1 MG/1
1 TABLET ORAL DAILY
Status: DISCONTINUED | OUTPATIENT
Start: 2021-01-13 | End: 2021-01-18 | Stop reason: HOSPADM

## 2021-01-13 RX ORDER — FENTANYL CITRATE/PF 50 MCG/ML
25 SYRINGE (ML) INJECTION
Status: DISCONTINUED | OUTPATIENT
Start: 2021-01-13 | End: 2021-01-13 | Stop reason: HOSPADM

## 2021-01-13 RX ORDER — ONDANSETRON 2 MG/ML
4 INJECTION INTRAMUSCULAR; INTRAVENOUS EVERY 6 HOURS PRN
Status: DISCONTINUED | OUTPATIENT
Start: 2021-01-13 | End: 2021-01-18 | Stop reason: HOSPADM

## 2021-01-13 RX ORDER — POVIDONE-IODINE 10 MG/G
OINTMENT TOPICAL AS NEEDED
Status: DISCONTINUED | OUTPATIENT
Start: 2021-01-13 | End: 2021-01-13 | Stop reason: HOSPADM

## 2021-01-13 RX ORDER — ONDANSETRON 2 MG/ML
INJECTION INTRAMUSCULAR; INTRAVENOUS AS NEEDED
Status: DISCONTINUED | OUTPATIENT
Start: 2021-01-13 | End: 2021-01-13

## 2021-01-13 RX ORDER — EPHEDRINE SULFATE 50 MG/ML
INJECTION INTRAVENOUS AS NEEDED
Status: DISCONTINUED | OUTPATIENT
Start: 2021-01-13 | End: 2021-01-13

## 2021-01-13 RX ORDER — FONDAPARINUX SODIUM 2.5 MG/.5ML
2.5 INJECTION SUBCUTANEOUS DAILY
Status: DISCONTINUED | OUTPATIENT
Start: 2021-01-14 | End: 2021-01-18 | Stop reason: HOSPADM

## 2021-01-13 RX ORDER — CEFAZOLIN SODIUM 1 G/50ML
1000 SOLUTION INTRAVENOUS EVERY 8 HOURS
Status: COMPLETED | OUTPATIENT
Start: 2021-01-13 | End: 2021-01-14

## 2021-01-13 RX ORDER — CHLORHEXIDINE GLUCONATE 4 G/100ML
SOLUTION TOPICAL DAILY PRN
Status: DISCONTINUED | OUTPATIENT
Start: 2021-01-13 | End: 2021-01-13

## 2021-01-13 RX ORDER — LIDOCAINE HYDROCHLORIDE 10 MG/ML
INJECTION, SOLUTION EPIDURAL; INFILTRATION; INTRACAUDAL; PERINEURAL
Status: COMPLETED | OUTPATIENT
Start: 2021-01-13 | End: 2021-01-13

## 2021-01-13 RX ORDER — FENTANYL CITRATE 50 UG/ML
INJECTION, SOLUTION INTRAMUSCULAR; INTRAVENOUS AS NEEDED
Status: DISCONTINUED | OUTPATIENT
Start: 2021-01-13 | End: 2021-01-13

## 2021-01-13 RX ORDER — FERROUS SULFATE 325(65) MG
325 TABLET ORAL 2 TIMES DAILY WITH MEALS
Status: DISCONTINUED | OUTPATIENT
Start: 2021-01-13 | End: 2021-01-18 | Stop reason: HOSPADM

## 2021-01-13 RX ORDER — ASCORBIC ACID 500 MG
500 TABLET ORAL 2 TIMES DAILY
Status: DISCONTINUED | OUTPATIENT
Start: 2021-01-13 | End: 2021-01-18 | Stop reason: HOSPADM

## 2021-01-13 RX ORDER — MIDAZOLAM HYDROCHLORIDE 2 MG/2ML
INJECTION, SOLUTION INTRAMUSCULAR; INTRAVENOUS AS NEEDED
Status: DISCONTINUED | OUTPATIENT
Start: 2021-01-13 | End: 2021-01-13

## 2021-01-13 RX ORDER — PROPOFOL 10 MG/ML
INJECTION, EMULSION INTRAVENOUS CONTINUOUS PRN
Status: DISCONTINUED | OUTPATIENT
Start: 2021-01-13 | End: 2021-01-13

## 2021-01-13 RX ORDER — CHLORHEXIDINE GLUCONATE 0.12 MG/ML
15 RINSE ORAL ONCE
Status: COMPLETED | OUTPATIENT
Start: 2021-01-13 | End: 2021-01-13

## 2021-01-13 RX ORDER — SODIUM CHLORIDE, SODIUM LACTATE, POTASSIUM CHLORIDE, CALCIUM CHLORIDE 600; 310; 30; 20 MG/100ML; MG/100ML; MG/100ML; MG/100ML
125 INJECTION, SOLUTION INTRAVENOUS CONTINUOUS
Status: DISCONTINUED | OUTPATIENT
Start: 2021-01-13 | End: 2021-01-13

## 2021-01-13 RX ORDER — ONDANSETRON 2 MG/ML
4 INJECTION INTRAMUSCULAR; INTRAVENOUS ONCE AS NEEDED
Status: DISCONTINUED | OUTPATIENT
Start: 2021-01-13 | End: 2021-01-13 | Stop reason: HOSPADM

## 2021-01-13 RX ORDER — CEFAZOLIN SODIUM 2 G/50ML
2000 SOLUTION INTRAVENOUS ONCE
Status: COMPLETED | OUTPATIENT
Start: 2021-01-13 | End: 2021-01-13

## 2021-01-13 RX ORDER — MAGNESIUM HYDROXIDE/ALUMINUM HYDROXICE/SIMETHICONE 120; 1200; 1200 MG/30ML; MG/30ML; MG/30ML
30 SUSPENSION ORAL EVERY 6 HOURS PRN
Status: DISCONTINUED | OUTPATIENT
Start: 2021-01-13 | End: 2021-01-18 | Stop reason: HOSPADM

## 2021-01-13 RX ORDER — SODIUM CHLORIDE, SODIUM LACTATE, POTASSIUM CHLORIDE, CALCIUM CHLORIDE 600; 310; 30; 20 MG/100ML; MG/100ML; MG/100ML; MG/100ML
125 INJECTION, SOLUTION INTRAVENOUS CONTINUOUS
Status: ACTIVE | OUTPATIENT
Start: 2021-01-13 | End: 2021-01-13

## 2021-01-13 RX ORDER — HYDROMORPHONE HCL/PF 1 MG/ML
0.2 SYRINGE (ML) INJECTION
Status: DISCONTINUED | OUTPATIENT
Start: 2021-01-13 | End: 2021-01-13 | Stop reason: HOSPADM

## 2021-01-13 RX ADMIN — DOCUSATE SODIUM 100 MG: 100 CAPSULE, LIQUID FILLED ORAL at 18:01

## 2021-01-13 RX ADMIN — OXYCODONE HYDROCHLORIDE AND ACETAMINOPHEN 500 MG: 500 TABLET ORAL at 18:01

## 2021-01-13 RX ADMIN — CEFAZOLIN SODIUM 1000 MG: 1 SOLUTION INTRAVENOUS at 15:05

## 2021-01-13 RX ADMIN — PROPOFOL 100 MCG/KG/MIN: 10 INJECTION, EMULSION INTRAVENOUS at 07:36

## 2021-01-13 RX ADMIN — SODIUM CHLORIDE, SODIUM LACTATE, POTASSIUM CHLORIDE, AND CALCIUM CHLORIDE 125 ML/HR: .6; .31; .03; .02 INJECTION, SOLUTION INTRAVENOUS at 06:28

## 2021-01-13 RX ADMIN — BUPIVACAINE HYDROCHLORIDE IN DEXTROSE 1.6 ML: 7.5 INJECTION, SOLUTION SUBARACHNOID at 07:32

## 2021-01-13 RX ADMIN — LIDOCAINE HYDROCHLORIDE 3 ML: 10 INJECTION, SOLUTION EPIDURAL; INFILTRATION; INTRACAUDAL; PERINEURAL at 07:10

## 2021-01-13 RX ADMIN — BUPIVACAINE HYDROCHLORIDE 10 ML: 2.5 INJECTION, SOLUTION EPIDURAL; INFILTRATION; INTRACAUDAL; PERINEURAL at 07:04

## 2021-01-13 RX ADMIN — SODIUM CHLORIDE, SODIUM LACTATE, POTASSIUM CHLORIDE, AND CALCIUM CHLORIDE: .6; .31; .03; .02 INJECTION, SOLUTION INTRAVENOUS at 07:20

## 2021-01-13 RX ADMIN — SODIUM CHLORIDE, SODIUM LACTATE, POTASSIUM CHLORIDE, AND CALCIUM CHLORIDE: .6; .31; .03; .02 INJECTION, SOLUTION INTRAVENOUS at 09:00

## 2021-01-13 RX ADMIN — OXYCODONE HYDROCHLORIDE AND ACETAMINOPHEN 500 MG: 500 TABLET ORAL at 11:23

## 2021-01-13 RX ADMIN — Medication 1 TABLET: at 11:22

## 2021-01-13 RX ADMIN — CEFAZOLIN SODIUM 2000 MG: 2 SOLUTION INTRAVENOUS at 07:27

## 2021-01-13 RX ADMIN — DOCUSATE SODIUM 100 MG: 100 CAPSULE, LIQUID FILLED ORAL at 11:23

## 2021-01-13 RX ADMIN — MORPHINE SULFATE 3 MG: 4 INJECTION INTRAVENOUS at 18:10

## 2021-01-13 RX ADMIN — EPHEDRINE SULFATE 5 MG: 50 INJECTION, SOLUTION INTRAVENOUS at 08:00

## 2021-01-13 RX ADMIN — FENTANYL CITRATE 50 MCG: 50 INJECTION INTRAMUSCULAR; INTRAVENOUS at 07:02

## 2021-01-13 RX ADMIN — BUPIVACAINE HYDROCHLORIDE 10 ML: 2.5 INJECTION, SOLUTION EPIDURAL; INFILTRATION; INTRACAUDAL; PERINEURAL at 07:10

## 2021-01-13 RX ADMIN — MIDAZOLAM HYDROCHLORIDE 2 MG: 1 INJECTION, SOLUTION INTRAMUSCULAR; INTRAVENOUS at 07:02

## 2021-01-13 RX ADMIN — MORPHINE SULFATE 3 MG: 4 INJECTION INTRAVENOUS at 22:16

## 2021-01-13 RX ADMIN — PHENYLEPHRINE HYDROCHLORIDE 50 MCG/MIN: 10 INJECTION INTRAVENOUS at 07:38

## 2021-01-13 RX ADMIN — FENTANYL CITRATE 50 MCG: 50 INJECTION INTRAMUSCULAR; INTRAVENOUS at 07:36

## 2021-01-13 RX ADMIN — ONDANSETRON 4 MG: 2 INJECTION INTRAMUSCULAR; INTRAVENOUS at 09:06

## 2021-01-13 RX ADMIN — OXYCODONE HYDROCHLORIDE AND ACETAMINOPHEN 1 TABLET: 5; 325 TABLET ORAL at 11:23

## 2021-01-13 RX ADMIN — FERROUS SULFATE TAB 325 MG (65 MG ELEMENTAL FE) 325 MG: 325 (65 FE) TAB at 11:21

## 2021-01-13 RX ADMIN — MORPHINE SULFATE 3 MG: 4 INJECTION INTRAVENOUS at 14:10

## 2021-01-13 RX ADMIN — FOLIC ACID 1 MG: 1 TABLET ORAL at 11:22

## 2021-01-13 RX ADMIN — FERROUS SULFATE TAB 325 MG (65 MG ELEMENTAL FE) 325 MG: 325 (65 FE) TAB at 18:01

## 2021-01-13 RX ADMIN — CHLORHEXIDINE GLUCONATE 0.12% ORAL RINSE 15 ML: 1.2 LIQUID ORAL at 06:28

## 2021-01-13 NOTE — ANESTHESIA PROCEDURE NOTES
Peripheral Block    Patient location during procedure: holding area  Start time: 1/13/2021 7:10 AM  Reason for block: at surgeon's request and post-op pain management  Staffing  Anesthesiologist: Rosalia Pepe MD  Performed: anesthesiologist   Preanesthetic Checklist  Completed: patient identified, site marked, surgical consent, pre-op evaluation, timeout performed, IV checked, risks and benefits discussed and monitors and equipment checked  Peripheral Block  Patient position: supine  Prep: ChloraPrep  Patient monitoring: continuous pulse ox and frequent blood pressure checks  Block type: ipack block  Laterality: left  Injection technique: single-shot  Procedures: ultrasound guided, Ultrasound guidance required for the procedure to increase accuracy and safety of medication placement and decrease risk of complications    Ultrasound permanent image savedbupivacaine (MARCAINE) 0 25 % perineural infiltration, 10 mL  lidocaine (PF) (XYLOCAINE-MPF) 1 % infiltration, 3 mL  Needle  [facility]   Needle gauge: 22 G  Needle length: 5 cm  Needle localization: ultrasound guidance  Needle insertion depth: 3 5 cm  Test dose: negative  Assessment  Injection assessment: incremental injection and local visualized surrounding nerve on ultrasound  Paresthesia pain: none  Heart rate change: no  Slow fractionated injection: yes  patient tolerated the procedure well with no immediate complications

## 2021-01-13 NOTE — PLAN OF CARE
Problem: PAIN - ADULT  Goal: Verbalizes/displays adequate comfort level or baseline comfort level  Description: Interventions:  - Encourage patient to monitor pain and request assistance  - Assess pain using appropriate pain scale  - Administer analgesics based on type and severity of pain and evaluate response  - Implement non-pharmacological measures as appropriate and evaluate response  - Consider cultural and social influences on pain and pain management  - Notify physician/advanced practitioner if interventions unsuccessful or patient reports new pain  Outcome: Progressing     Problem: INFECTION - ADULT  Goal: Absence or prevention of progression during hospitalization  Description: INTERVENTIONS:  - Assess and monitor for signs and symptoms of infection  - Monitor lab/diagnostic results  - Monitor all insertion sites, i e  indwelling lines, tubes, and drains  - Monitor endotracheal if appropriate and nasal secretions for changes in amount and color  - [place] appropriate cooling/warming therapies per order  - Administer medications as ordered  - Instruct and encourage patient and family to use good hand hygiene technique  - Identify and instruct in appropriate isolation precautions for identified infection/condition  Outcome: Progressing  Goal: Absence of fever/infection during neutropenic period  Description: INTERVENTIONS:  - Monitor WBC    Outcome: Progressing     Problem: SAFETY ADULT  Goal: Patient will remain free of falls  Description: INTERVENTIONS:  - Assess patient frequently for physical needs  -  Identify cognitive and physical deficits and behaviors that affect risk of falls    -  [place] fall precautions as indicated by assessment   - Educate patient/family on patient safety including physical limitations  - Instruct patient to call for assistance with activity based on assessment  - Modify environment to reduce risk of injury  - Consider OT/PT consult to assist with strengthening/mobility  Outcome: Progressing  Goal: Maintain or return to baseline ADL function  Description: INTERVENTIONS:  -  Assess patient's ability to carry out ADLs; assess patient's baseline for ADL function and identify physical deficits which impact ability to perform ADLs (bathing, care of mouth/teeth, toileting, grooming, dressing, etc )  - Assess/evaluate cause of self-care deficits   - Assess range of motion  - Assess patient's mobility; develop plan if impaired  - Assess patient's need for assistive devices and provide as appropriate  - Encourage maximum independence but intervene and supervise when necessary  - Involve family in performance of ADLs  - Assess for home care needs following discharge   - Consider OT consult to assist with ADL evaluation and planning for discharge  - Provide patient education as appropriate  Outcome: Progressing  Goal: Maintain or return mobility status to optimal level  Description: INTERVENTIONS:  - Assess patient's baseline mobility status (ambulation, transfers, stairs, etc )    - Identify cognitive and physical deficits and behaviors that affect mobility  - Identify mobility aids required to assist with transfers and/or ambulation (gait belt, sit-to-stand, lift, walker, cane, etc )  - [place] fall precautions as indicated by assessment  - Record patient progress and toleration of activity level on Mobility SBAR; progress patient to next Phase/Stage  - Instruct patient to call for assistance with activity based on assessment  - Consider rehabilitation consult to assist with strengthening/weightbearing, etc   Outcome: Progressing     Problem: DISCHARGE PLANNING  Goal: Discharge to home or other facility with appropriate resources  Description: INTERVENTIONS:  - Identify barriers to discharge w/patient and caregiver  - Arrange for needed discharge resources and transportation as appropriate  - Identify discharge learning needs (meds, wound care, etc )  - - Refer to Case Management Department for coordinating discharge planning if the patient needs post-hospital services based on physician/advanced practitioner order or complex needs related to functional status, cognitive ability, or social support system  Outcome: Progressing     Problem: CARDIOVASCULAR - ADULT  Goal: Maintains optimal cardiac output and hemodynamic stability  Description: INTERVENTIONS:  - Monitor I/O, vital signs and rhythm  - Monitor for S/S and trends of decreased cardiac output  - Administer and titrate ordered vasoactive medications to optimize hemodynamic stability  - Assess quality of pulses, skin color and temperature  - Assess for signs of decreased coronary artery perfusion  - Instruct patient to report change in severity of symptoms  Outcome: Progressing     Problem: Knowledge Deficit  Goal: Patient/family/caregiver demonstrates understanding of disease process, treatment plan, medications, and discharge instructions  Description: Complete learning assessment and assess knowledge base    Interventions:  - Provide teaching at level of understanding  - Provide teaching via preferred learning methods  Outcome: Progressing     Problem: RESPIRATORY - ADULT  Goal: Achieves optimal ventilation and oxygenation  Description: INTERVENTIONS:  - Assess for changes in respiratory status  - Assess for changes in mentation and behavior  - Position to facilitate oxygenation and minimize respiratory effort  - Oxygen administered by appropriate delivery if ordered  - Initiate smoking cessation education as indicated  - Encourage broncho-pulmonary hygiene including cough, deep breathe, Incentive Spirometry  - Assess the need for suctioning and aspirate as needed  - Assess and instruct to report SOB or any respiratory difficulty  - Respiratory Therapy support as indicated  Outcome: Progressing     Problem: SKIN/TISSUE INTEGRITY - ADULT  Goal: Incision(s), wounds(s) or drain site(s) healing without S/S of infection  Description: INTERVENTIONS  - Assess and document risk factors for skin impairment   - Assess and document dressing, incision, wound bed, drain sites and surrounding tissue  - Consider nutrition services referral as needed  - Oral mucous membranes remain intact  - Provide patient/ family education  Outcome: Progressing     Problem: MUSCULOSKELETAL - ADULT  Goal: Maintain proper alignment of affected body part  Description: INTERVENTIONS:  - Support, maintain and protect limb and body alignment  - Provide patient/ family with appropriate education  Outcome: Progressing

## 2021-01-13 NOTE — CONSULTS
Consult- Augustus Cavazos 1955, 77 y o  male MRN: [id number]    Unit/Bed#: -01 Encounter: [id number]    Primary Care Provider: Neale Nageotte, MD   Date and time admitted to hospital: 1/13/2021  5:39 AM      Inpatient consult to Internal Medicine  Consult performed by: TAHIRA Miranda  Consult ordered by: Jesús Moore PA-C          * Primary osteoarthritis of left knee  Assessment & Plan  · Underwent a left total knee replacement on 01/13/2021   · treatment per primary team  · PT/OT and pain management    Tobacco abuse  Assessment & Plan  · Smoking cessation discussed  Not ready to quit at this time  · Will nicotine patch while in house        Recommendations for Discharge:  · Per Primary Service    Thank you for letting us participate in the patient's care  Please feel free to reach out with any questions or concerns  Internal Medicine will be following the patient along with you on an as-needed basis  History of Present Illness:  Augustus Cavazos is a 77 y o  male who is originally admitted to the Orthopedic surgery service due to osteoarthritis of the left knee underwent elective left total knee replacement  We are consulted for medical management  The patient seen examined his room  He denies any chest pain, palpitation, nausea, vomiting, abdominal pain  He states that he does have pain on the left knee that was much improved with morphine  Review of Systems:  Review of Systems   Constitutional: Negative for chills, fatigue and fever  HENT: Negative for congestion, ear pain, postnasal drip and sore throat  Eyes: Negative for discharge, itching and visual disturbance  Respiratory: Negative for cough, chest tightness and shortness of breath  Cardiovascular: Negative for chest pain, palpitations and leg swelling  Gastrointestinal: Negative for abdominal pain, nausea and vomiting  Endocrine: Negative for cold intolerance and heat intolerance     Genitourinary: Negative for difficulty urinating, dysuria and hematuria  Musculoskeletal: Negative for back pain, gait problem and neck pain  Left knee pain     Skin: Negative for rash and wound  Neurological: Negative for dizziness, speech difficulty, weakness, light-headedness and headaches  Psychiatric/Behavioral: Negative for behavioral problems, confusion and decreased concentration         Past Medical and Surgical History:   Past Medical History:   Diagnosis Date    Arthritis     Rotator cuff injury     left shoulder       Past Surgical History:   Procedure Laterality Date    JOINT REPLACEMENT Right 2018    MULTIPLE TOOTH EXTRACTIONS      in office under local    [place] TOTAL KNEE ARTHROPLASTY Right 2/26/2019    Procedure: ARTHROPLASTY KNEE TOTAL;  Surgeon: Chela Parikh DO;  Location: [facility] MAIN OR;  Service: Orthopedics    TONSILLECTOMY         Meds/Allergies:  all medications and allergies reviewed    Allergies: No Known Allergies    Social History:     Marital Status:     Substance Use History:   Social History     Substance and Sexual Activity   Alcohol Use Yes    Alcohol/week: 4 0 standard drinks    Types: 3 Cans of beer, 1 Shots of liquor per week    Comment: 4-6 drinks a week     Social History     Tobacco Use   Smoking Status Light Tobacco Smoker    Packs/day: 0 25    Years: 51 00    Pack years: 12 75    Types: Cigarettes   Smokeless Tobacco Current User   Tobacco Comment    2 packs a week     Social History     Substance and Sexual Activity   Drug Use Never       Family History:  I have reviewed the patients family history    Physical Exam:   Vitals:   Blood Pressure: 117/62 (01/13/21 1500)  Pulse: 77 (01/13/21 1500)  Temperature: 97 8 °F (36 6 °C) (01/13/21 1522)  Temp Source: Tympanic (01/13/21 1522)  Respirations: 20 (01/13/21 1500)  Height: 5' 6" (167 6 cm) (01/13/21 0555)  Weight - Scale: 88 5 kg (195 lb) (01/13/21 0555)  SpO2: 98 % (01/13/21 1500)    Physical Exam  Vitals signs and nursing note reviewed  Constitutional:       General: He is not in acute distress  Appearance: Normal appearance  HENT:      Head: Normocephalic and atraumatic  Right Ear: External ear normal       Left Ear: External ear normal       Nose: Nose normal       Mouth/Throat:      Mouth: Mucous membranes are moist       Pharynx: Oropharynx is clear  Eyes:      General:         Right eye: No discharge  Left eye: No discharge  Extraocular Movements: Extraocular movements intact  Pupils: Pupils are equal, round, and reactive to light  Neck:      Musculoskeletal: Normal range of motion and neck supple  No neck rigidity  Cardiovascular:      Rate and Rhythm: Normal rate and regular rhythm  Pulses: Normal pulses  Heart sounds: Normal heart sounds  No murmur  Pulmonary:      Effort: Pulmonary effort is normal  No respiratory distress  Breath sounds: Normal breath sounds  No wheezing or rales  Abdominal:      General: Bowel sounds are normal  There is no distension  Palpations: Abdomen is soft  There is no mass  Tenderness: There is no abdominal tenderness  Musculoskeletal:         General: No swelling, tenderness or deformity  Comments: Left knee with CPM and dressing on    Skin:     General: Skin is warm and dry  Capillary Refill: Capillary refill takes less than 2 seconds  Coloration: Skin is not pale  Findings: No erythema  Neurological:      General: No focal deficit present  Mental Status: He is alert and oriented to person, place, and time  Mental status is at baseline  Psychiatric:         Mood and Affect: Mood normal          Behavior: Behavior normal          Thought Content: Thought content normal          Judgment: Judgment normal          Additional Data:   Lab Results: I have personally reviewed pertinent reports                  Invalid input(s): LABALBU          Lab Results   Component Value Date/Time    HGBA1C 5 2 2020 08:33 AM           Imaging: I have personally reviewed pertinent reports  XR knee left 1 or 2 views    (Results Pending)       EKG, Pathology, and Other Studies Reviewed on Admission:   · EK2020 normal sinus rhythm with heart rate of 75    ** Please Note: This note has been constructed using a voice recognition system   **

## 2021-01-13 NOTE — OP NOTE
OPERATIVE REPORT  PATIENT NAME: Sinan Cartwright    :  1955  MRN: [id number]  Pt Location: [address] [place] [address] OR ROOM 03    SURGERY DATE: 2021    Surgeon(s) and Role:     * Christine Thrasher DO - Primary     * Ulysses Fila, PA-C - Assisting    Preop Diagnosis:  Osteoarthritis of left knee, unspecified osteoarthritis type [M17 12]    Post-Op Diagnosis Codes:     * Osteoarthritis of left knee, unspecified osteoarthritis type [M17 12]    Procedure(s) (LRB):  KNEE TOTAL ARTHROPLASTY (Left) using the Laura NextGen system with the following sizes:  E LPS femoral component, #5 Tibial tray, 12 mm polyarticular surface, and a 35 mm patella button    Specimen(s):  Bone/synovium    Estimated Blood Loss:   50 cc    Drains:  Solcotrans    Anesthesia Type:   Spinal with Ipack and adductor canal block    Operative Indications:  Osteoarthritis of left knee, unspecified osteoarthritis type [M17 12]      Operative Findings:  TT: 79    Complications:   None    Procedure and Technique:    The patient was properly identified and brought to the operative suite   After successful induction of the anesthetic, a tourniquet was placed on the patient's left proximal thigh   The left lower extremity was prepped and draped in the usual usual sterile fashion  Patient was given a dose of intravenous antibiotics       It was medically necessary that the [de-identified] assistant be in the room to aid in positioning placing the appropriate amount of retraction the patient  A qualified resident was not available     The  Surgical landmarks were outline  With  a skin marker  Eneida Lyon was used to examine the left lower extremity and the tourniquet was then inflated   Using a #10  Scalpel  Blade, an anterior incision was made on patient's left knee   Hemostasis was achieved with the use of electrocautery  Maurizio Rife is a significant amount of dissection through adipose tissue the capsule and  quadricep tendon  Was  then located  Rick vargas 2nd #10   Scalpel blade a medial parapatellar arthrotomy did occur with rush of clear synovial fluid   A posteriormedial sleeve was developed to the level of the semi membranous  tendon  The patella then everted and  knee was flexed   The retro and  superior fat pads were excised  The cruciate ligaments were  divided   At this point the proximal tibia could  be anteriorly subluxed   An intramedullary drill hole was placed in the proximal tibia, followed by the external cutting jig   The  smallest portion of deficient medial side bone was to  be removed, with its corresponding lateral side   Varus and  valgus angulation is also checked   The collateral ligaments were protected   The neurovascular bundle was then protected   The proximal tibia was then osteotomized   Attention then paid to the preparation of distal femur   The intramedullary natalya was placed with a 6 degree valgus cut placed approximately 3° of external rotation   The external  cutting jig was placed on top of this   An additional 2 mm cut the because of a flexion contracture   The collateral ligaments were protected   The  distal femur was then osteotomized   The femur was then sized   A size E did a best fit    Both gender and  standard cutting blocks were checked and a standard specific  cutting block did  Have the best fit  Without any excessive notching of the anterior condyle   The collaterals and then protected, the distal femur was an osteotomized following sequence 1 anterior condyle 2  posterior condyle 3 posterior chamfer and 4 anterior chamfer   At this point all the bone fragments and then removed   A lamina  was placed both the medial and lateral sides and the redundant menisci and posterior osteophytes were then removed   Flexion-extension blocks were placed next and a 12 mm block gave good symmetric balancing   The femur was then finished with the trochlear recess and notch block placed in a slightly lateral position to facilitate tracking patella  The tibial was sized next   A size #5 did have  best fit   The size E LPS standard femoral component placed, followed by a #5 Tibial tray, followed by several polyarticular services and a 12 mm tray did the best fit   The rotation of the [place] was then marked   The patella was inspected next  Kasia Dais was a tremendous amount of arthrosis   The  peripheral osteophytes removed with an removed   With that confirmed a caliper that there is adequate bone stock   And then using his injuries Laura reaming system approximately 9 mm of undersurface patella then reamed   The patella sized to a 35 mm patella button   This guide was drilled in a slightly superior medial position facilitate tracking patella   The tibia was then finished with a drill hole drill hole and keel punch   At this point there was there was good stability and range of motion in the knee   All the trial components removed   The wound was copiously irrigated sterile antibiotic solution  Cement  was being mixed on the back table was used 3rd generation cementing techniques   The proximal tibia cemented in placed followed by distal femur   A trial poly articular surface was placed till the cement fully cured   The patella was  cemented in place and held with a patellar clamp till the cement fully cured as well   Once the cement fully cured, it was irrigated   The polyarticular surfaces were  tried once again and a 12 mm tray to the best fit   The final 12 mm tray was placed reduced 1 final time and found to be quite stable   After it was irrigated,  a quarter-inch solcotrans  Was  placed near the superior aspect  Of the incision   The quadriceps  and capsule were closed with #1  Vicryl   Deep layer fascia closed multiple layers of 0 Vicryl   Superficial was closed with 2-0 Vicryl   The skin was approximated  With staples   The wounds were dressed with ointment Xeroform 4x4s ABDs sterile Webril and Ace bandage   There was no complication f procedure   He was successfully awoken,  [facility]  [facility],  And went to the recovery room stable  In condition           I was present for the entire procedure, A qualified resident physician was not available and A physician assistant was required during the procedure for retraction tissue handling,dissection and suturing    Patient Disposition:  PACU     SIGNATURE: Lewis Burgos DO  DATE: January 13, 2021  TIME: 7:29 AM

## 2021-01-13 NOTE — QUICK NOTE
This patient underwent a procedure not on the inpatient only list and therefore is subject to the 2 midnight benchmark  Accordingly, in my judgement, the patient will require at least 2 midnights in the hospital receiving acute medical care  The patient is noted to have comorbid conditions such as requiring IV pain medication as well as limited endurance secondary to a total knee replacement which will require management in the clau-operative period prior to safe discharge  As such the patient will require acute care beyond the usual and routine recovery period for the procedure alone and is therefore appropriate for inpatient admission

## 2021-01-13 NOTE — INTERVAL H&P NOTE
H&P reviewed  After examining the patient I find no changes in the patients condition since the H&P had been written      Vitals:    01/13/21 0555   BP: 141/77   Pulse: 85   Resp: 18   Temp: 97 5 °F (36 4 °C)   SpO2: 98%

## 2021-01-13 NOTE — OCCUPATIONAL THERAPY NOTE
Occupational Therapy Evaluation      [facility]    1/13/2021    Principal Problem:    Primary osteoarthritis of left knee      Past Medical History:   Diagnosis Date    Arthritis     Rotator cuff injury     left shoulder       Past Surgical History:   Procedure Laterality Date    JOINT REPLACEMENT Right 2018    MULTIPLE TOOTH EXTRACTIONS      in office under local    [place] TOTAL KNEE ARTHROPLASTY Right 2/26/2019    Procedure: ARTHROPLASTY KNEE TOTAL;  Surgeon: Migue Barth DO;  Location: [facility] MAIN OR;  Service: Orthopedics    TONSILLECTOMY          01/13/21 1149   OT Last Visit   OT Visit Date 01/13/21   Note Type   Note type Evaluation   Restrictions/Precautions   Weight Bearing Precautions Per Order Yes   LLE Weight Bearing Per Order WBAT   Other Precautions Fall Risk;Pain;Multiple lines;WBS   Pain Assessment   Pain Assessment Tool 0-10   Pain Score 6   Pain Location/Orientation Orientation: Left; Location: Knee   Pain Onset/Description Onset: Ongoing   Patient's Stated Pain Goal No pain   Hospital Pain Intervention(s) Cold applied;Repositioned; Ambulation/increased activity; Emotional support; Rest   Home Living   Type of [address] Two level;1/2 bath on main level;Performs ADLs on one level;Stairs to enter with rails  (2 ISMAEL)   Bathroom Shower/Tub Tub/shower unit   [facility] bars in shower; Shower chair   Bathroom Accessibility Accessible   Home Equipment Walker;Cane;Crutches   Prior Function   Level of [place] Independent with ADLs and functional mobility   Lives With Alone   Receives Help From Family; Neighbor  (son + brother)   ADL Assistance Independent   IADLs Independent   Falls in the last 6 months 0   Vocational Retired   ADL   UB Bathing Assistance 5  [address] [facility] 3  Moderate Assistance   [address] 5  Supervision/Setup    [facility] 2  Maximal Assistance   Bed Mobility   Supine to Sit 5  Supervision   Additional items Assist x 1; Increased time required;Verbal cues;LE management   Sit to Supine 5  Supervision   Additional items Assist x 1; Increased time required;Verbal cues   Additional Comments pt denies any lightheadedness/dizziness  BP pre mobility = 140/80, BP sitting at EOB prior to standing trial = 142/76   Transfers   Sit to Stand 5  Supervision   Additional items Assist x 1; Increased time required;Verbal cues   Stand to Sit 5  Supervision   Additional items Assist x 1;Verbal cues   Balance   Static Sitting Good   Dynamic Sitting Fair +   Static Standing Fair -   Dynamic Standing Fair -   Ambulatory Fair -   Activity Tolerance   Activity Tolerance Patient limited by pain   Nurse Made Aware ALETHEA Pearce   RUE Assessment   RUE Assessment WFL   LUE Assessment   LUE Assessment WFL   Hand Function   Gross Motor Coordination Functional   Fine Motor Coordination Functional   Cognition   Overall Cognitive Status WFL   Arousal/Participation Alert; Cooperative   Attention Within functional limits   Orientation Level Oriented X4   Memory Within functional limits   Following Commands Follows all commands and directions without difficulty   Assessment   Limitation Decreased ADL status; Decreased Safe judgement during ADL;Decreased endurance;Decreased self-care trans;Decreased high-level ADLs   Prognosis Good   Assessment Pt is a 77 y o  male seen for OT evaluation s/p admit to [address] [place] [address] on 1/13/2021 w/ Primary osteoarthritis of left knee  Comorbidities affecting pt's functional performance at time of assessment include: Total L knee replacement  Personal factors affecting pt at time of IE include:steps to enter environment, difficulty performing ADLS and difficulty performing IADLS   Prior to admission, pt was I with ADLs  Upon evaluation: the following deficits impact occupational performance: decreased balance, decreased tolerance, increased pain and orthopedic restrictions   Pt to benefit from continued skilled OT tx while in the hospital to address deficits as defined above and maximize level of functional independence w ADL's and functional mobility  Occupational Performance areas to address include: bathing/shower, toilet hygiene, dressing, functional mobility and clothing management  From OT standpoint, recommendation at time of d/c would be Home OT  Goals   Patient Goals "to return home"   Plan   Treatment Interventions ADL retraining;Functional transfer training; Endurance training;Equipment evaluation/education; Neuromuscular reeducation; Energy conservation   Goal Expiration Date 01/23/21   OT Treatment Day 0   OT Frequency 3-5x/wk   Recommendation   OT Discharge Recommendation Home with skilled therapy   OT - OK to Discharge Yes  (when medically stable)     GOALS:    Pt will achieve the following within specified time frame: STG  Pt will achieve the following goals within 5 days    *ADL transfers with (I) for inc'd independence with ADLs/purposeful tasks    *UB ADL with (I) for inc'd independence with self cares    *LB ADL with Min (A) using AE prn for inc'd independence with self cares    *Toileting with CGA for clothing management and hygiene for return to PLOF with personal care    *Increase static stand balance and dyn stand balance to F for inc'd safety with standing purposeful tasks    *Increase stand tolerance x3 m for inc'd tolerance with standing purposeful tasks    *Participate in 10m UE therex to increase overall stamina/activity tolerance for purposeful tasks    *Bed mobility- (I) for inc'd independence to manage own comfort and initiate EOB & OOB purposeful tasks    Pt will achieve the following within specified time frame: LTG  Pt will achieve the following goals within 10 days    *LB ADL with (S) using AE prn for inc'd independence with self cares    *Toileting with (S) for clothing management and hygiene for return to PLOF with personal care    *Increase static stand balance and dyn stand balance to F+ for inc'd safety with standing purposeful tasks    *Increase stand tolerance x5 m for inc'd tolerance with standing purposeful tasks      Radha Vasques MS, OTR/L

## 2021-01-13 NOTE — ASSESSMENT & PLAN NOTE
· Underwent a left total knee replacement on 01/13/2021   · treatment per primary team  · PT/OT and pain management

## 2021-01-13 NOTE — ANESTHESIA POSTPROCEDURE EVALUATION
Post-Op Assessment Note    CV Status:  Stable  Pain Score: 0    Pain management: adequate     Mental Status:  Alert   Hydration Status:  Stable   PONV Controlled:  None   Airway Patency:  Patent      Post Op Vitals Reviewed: Yes      Staff: CRNA         No complications documented      BP   99/57   Temp   97 6   Pulse  76   Resp   16   SpO2   98%

## 2021-01-13 NOTE — ANESTHESIA PROCEDURE NOTES
Peripheral Block    Patient location during procedure: holding area  Start time: 1/13/2021 7:04 AM  Reason for block: at surgeon's request and post-op pain management  Staffing  Anesthesiologist: Majo Tai MD  Performed: anesthesiologist   Preanesthetic Checklist  Completed: patient identified, site marked, surgical consent, pre-op evaluation, timeout performed, IV checked, risks and benefits discussed and monitors and equipment checked  Peripheral Block  Patient position: supine  Prep: ChloraPrep  Patient monitoring: continuous pulse ox and frequent blood pressure checks  Block type: adductor canal block  Laterality: left  Injection technique: single-shot  Procedures: ultrasound guided, Ultrasound guidance required for the procedure to increase accuracy and safety of medication placement and decrease risk of complications    Ultrasound permanent image savedbupivacaine (MARCAINE) 0 25 % perineural infiltration, 10 mL  Needle  Needle type: Stimuplex   Needle gauge: 22 G  Needle length: 5 cm  Needle localization: anatomical landmarks and ultrasound guidance  Needle insertion depth: 2 cm  Test dose: negative  Assessment  Injection assessment: incremental injection and local visualized surrounding nerve on ultrasound  Paresthesia pain: none  Heart rate change: no  Slow fractionated injection: yes  Post-procedure:  site cleaned  patient tolerated the procedure well with no immediate complications  Additional Notes  10 cc exparel used

## 2021-01-13 NOTE — ANESTHESIA PROCEDURE NOTES
Spinal Block    Patient location during procedure: OR  Start time: 1/13/2021 7:30 AM  Reason for block: at surgeon's request and primary anesthetic  Staffing  Anesthesiologist: Dwain Lundberg MD  Resident/CRNA: Lisseth Spring CRNA  Performed: resident/CRNA   Preanesthetic Checklist  Completed: patient identified, site marked, surgical consent, pre-op evaluation, timeout performed, IV checked, risks and benefits discussed and monitors and equipment checked  Spinal Block  Patient position: sitting  Location: L3-4  Injection technique: single-shot  Needle  Needle type: pencil-tip   Needle gauge: 24 G  Needle length: 5 cm  Assessment  Events: cerebrospinal fluid  Injection Assessment:  negative aspiration for heme, no paresthesia on injection and positive aspiration for clear CSF    Post-procedure:  site cleaned

## 2021-01-13 NOTE — ASSESSMENT & PLAN NOTE
· Smoking cessation discussed  Not ready to quit at this time    · Will nicotine patch while in house

## 2021-01-13 NOTE — PLAN OF CARE
Problem: OCCUPATIONAL THERAPY ADULT  Goal: Performs self-care activities at highest level of function for planned discharge setting  See evaluation for individualized goals  Description: Treatment Interventions: ADL retraining, Functional transfer training, Endurance training, Equipment evaluation/education, Neuromuscular reeducation, Energy conservation     See flowsheet documentation for full assessment, interventions and recommendations  Note: Limitation: Decreased ADL status, Decreased Safe judgement during ADL, Decreased endurance, Decreased self-care trans, Decreased high-level ADLs  Prognosis: Good  Assessment: Pt is a 77 y o  male seen for OT evaluation s/p admit to [address] [place] [address] on 1/13/2021 w/ Primary osteoarthritis of left knee  Comorbidities affecting pt's functional performance at time of assessment include: Total L knee replacement  Personal factors affecting pt at time of IE include:steps to enter environment, difficulty performing ADLS and difficulty performing IADLS   Prior to admission, pt was I with ADLs  Upon evaluation: the following deficits impact occupational performance: decreased balance, decreased tolerance, increased pain and orthopedic restrictions  Pt to benefit from continued skilled OT tx while in the hospital to address deficits as defined above and maximize level of functional independence w ADL's and functional mobility  Occupational Performance areas to address include: bathing/shower, toilet hygiene, dressing, functional mobility and clothing management  From OT standpoint, recommendation at time of d/c would be Home OT       OT Discharge Recommendation: Home with skilled therapy  OT - OK to Discharge: Yes(when medically stable)    Radha Morris MS, OTR/L

## 2021-01-13 NOTE — PLAN OF CARE
Problem: PHYSICAL THERAPY ADULT  Goal: Performs mobility at highest level of function for planned discharge setting  See evaluation for individualized goals  Description: Treatment/Interventions: Functional transfer training, LE strengthening/ROM, Elevations, Therapeutic exercise, Endurance training, Patient/family training, Equipment eval/education, Bed mobility, Gait training, Spoke to nursing, Spoke to case management  Equipment Recommended: (continue RW use)       See flowsheet documentation for full assessment, interventions and recommendations  Note: Prognosis: Excellent  Problem List: Decreased strength, Decreased range of motion, Decreased endurance, Impaired balance, Decreased mobility, Decreased safety awareness, Decreased skin integrity, Orthopedic restrictions, Pain  Assessment: Pt is 77 y o  male seen for PT evaluation on 1/13/2021 s/p admit to [address] on 1/13/2021 w/ Primary osteoarthritis of left knee  PT consulted to assess pt's functional mobility and d/c needs  Order placed for PT eval and tx, w/ WBAT L LE and activity - beginning POD #0 orders  PT performed at least 2 patient identifiers during session: Name and wristband  Comorbidities affecting pt's physical performance at time of assessment include: BPH, h/o R TKA  PTA, pt was independent w/ all functional mobility w/ no AD, ambulates unrestricted distances and all terrain, has 2 ISMAEL, lives alone in 2 level home c 1st floor setup and retired  Personal factors affecting pt at time of IE include: ambulating w/ assistive device, inability to navigate level surfaces w/o external assistance, unable to perform physical activity and inability to perform IADLs   Please find objective findings from PT assessment regarding body systems outlined above with impairments and limitations including weakness, decreased ROM, impaired balance, decreased endurance, pain, decreased activity tolerance, fall risk and orthopedic restrictions, as well as mobility assessment (need for cueing for mobility technique)  The following objective measures performed on IE also reveal limitations: Barthel Index: 45/100, Modified [place]: 3 (moderate disability) and AM-PAC 6-Clicks: 53/97  Pt's clinical presentation is currently unstable/unpredictable seen in pt's presentation of need for input for task focus and mobility technique and ongoing medical assessment  Pt to benefit from continued PT tx to address deficits as defined above and maximize level of functional independent mobility and consistency  From PT/mobility standpoint, recommendation at time of d/c would be Home PT pending progress in order to facilitate return to PLOF  Barriers to Discharge: Decreased caregiver support(pt lives alone, 2 ISMAEL)     PT Discharge Recommendation: Home with skilled therapy(HHPT anticipated)     PT - OK to Discharge: No    See flowsheet documentation for full assessment

## 2021-01-13 NOTE — PHYSICAL THERAPY NOTE
Physical Therapy Evaluation     Patient's Name: Sinan Cartwright    Admitting Diagnosis  Osteoarthritis of left knee, unspecified osteoarthritis type [M17 12]    Problem List  Patient Active Problem List   Diagnosis    BPH (benign prostatic hyperplasia)    Tobacco abuse    Primary osteoarthritis of right knee    Primary osteoarthritis of left knee       Past Medical History  Past Medical History:   Diagnosis Date    Arthritis     Rotator cuff injury     left shoulder       Past Surgical History  Past Surgical History:   Procedure Laterality Date    JOINT REPLACEMENT Right 2018    MULTIPLE TOOTH EXTRACTIONS      in office under local    [place] TOTAL KNEE ARTHROPLASTY Right 2/26/2019    Procedure: ARTHROPLASTY KNEE TOTAL;  Surgeon: Christine Thrasher DO;  [facility] MAIN OR;  Service: Orthopedics    TONSILLECTOMY            01/13/21 1139   PT Last Visit   PT Visit Date 01/13/21   Note Type   Note type Evaluation   Pain Assessment   Pain Assessment Tool 0-10   Pain Score 6   Pain Location/Orientation Orientation: Left; Location: Knee   Pain Onset/Description Onset: Ongoing   Patient's Stated Pain Goal No pain   Hospital Pain Intervention(s) Cold applied;Repositioned; Ambulation/increased activity; Emotional support; Rest   Home Living   Type of [address] [place] [address] Two level;1/2 bath on main level;Performs ADLs on one level;Stairs to enter with rails  (2 ISMAEL)   Bathroom Shower/Tub Tub/shower unit   [employer] bars in shower; Shower chair   Bathroom Accessibility Accessible   Home Equipment Walker;Cane;Crutches   Prior Function   Level of [place] Independent with ADLs and functional mobility   Lives With Alone   Receives Help From Family; Neighbor  (son + brother)   [address] in the last 6 months 0   Vocational Retired   Restrictions/Precautions   [place] Bearing Precautions Per Order Yes   LLE [place] Bearing Per Order WBAT   Other Precautions Fall Risk;Pain;O2;Multiple lines;WBS   General   Family/Caregiver Present No   Cognition   Overall Cognitive Status WFL   Arousal/Participation Alert   Orientation Level Oriented X4   Memory Within functional limits   Following Commands Follows all commands and directions without difficulty   Comments pt agreeable to PT session   RLE Assessment   RLE Assessment WFL   LLE Assessment   LLE Assessment X   Strength LLE   L Hip Flexion 3-/5   L Knee Extension 2+/5   L Ankle Dorsiflexion 3-/5   Coordination   Movements are Fluid and Coordinated 1   Sensation WFL   Bed Mobility   Supine to Sit 5  Supervision   Additional items Assist x 1; Increased time required;Verbal cues;LE management   Sit to Supine 5  Supervision   Additional items Assist x 1; Increased time required;Verbal cues   Additional Comments pt denies any lightheadedness/dizziness  BP pre mobility = 140/80, BP sitting at EOB prior to standing trial = 142/76   Transfers   Sit to Stand 5  Supervision   Additional items Assist x 1; Increased time required;Verbal cues   Stand to Sit 5  Supervision   Additional items Assist x 1;Verbal cues   Ambulation/Elevation   Gait pattern Antalgic;Narrow BLAKE; Decreased foot clearance;Decreased L stance; Step to   Gait Assistance 5  Supervision   Additional items Assist x 1;Verbal cues   Assistive Device Rolling walker   Distance 3 ft sidestepping toward [facility]   Stair Management Assistance Not tested   Balance   Static Sitting Good   Dynamic Sitting Fair +   Static Standing Fair -   Dynamic Standing Fair -   Ambulatory Fair -   Endurance Deficit   Endurance Deficit Yes   Activity Tolerance   Activity Tolerance Patient limited by pain   Nurse Made Aware Yes, ALETHEA [place] verbalized pt appropriate for PT session, made aware of outcomes/recs   Assessment   Prognosis Excellent   Problem List Decreased strength;Decreased range of motion;Decreased endurance; Impaired balance;Decreased mobility; Decreased safety awareness;Decreased skin integrity;Orthopedic restrictions;Pain   Assessment Pt is 77 y o  male seen for PT evaluation on 1/13/2021 s/p admit to [address] on 1/13/2021 w/ Primary osteoarthritis of left knee  PT consulted to assess pt's functional mobility and d/c needs  Order placed for PT eval and tx, w/ WBAT L LE and activity - beginning POD #0 orders  PT performed at least 2 patient identifiers during session: Name and wristband  Comorbidities affecting pt's physical performance at time of assessment include: BPH, h/o R TKA  PTA, pt was independent w/ all functional mobility w/ no AD, ambulates unrestricted distances and all terrain, has 2 ISMAEL, lives alone in 2 level home c 1st floor setup and retired  Personal factors affecting pt at time of IE include: ambulating w/ assistive device, inability to navigate level surfaces w/o external assistance, unable to perform physical activity and inability to perform IADLs  Please find objective findings from PT assessment regarding body systems outlined above with impairments and limitations including weakness, decreased ROM, impaired balance, decreased endurance, pain, decreased activity tolerance, fall risk and orthopedic restrictions, as well as mobility assessment (need for cueing for mobility technique)  The following objective measures performed on IE also reveal limitations: Barthel Index: 45/100, Modified [place]: 3 (moderate disability) and AM-PAC 6-Clicks: 29/75  Pt's clinical presentation is currently unstable/unpredictable seen in pt's presentation of need for input for task focus and mobility technique and ongoing medical assessment  Pt to benefit from continued PT tx to address deficits as defined above and maximize level of functional independent mobility and consistency  From PT/mobility standpoint, recommendation at time of d/c would be Home PT pending progress in order to facilitate return to PLOF     Barriers to Discharge Decreased caregiver support  (pt lives alone, 2 ISMAEL)   Goals   Patient Goals "to return home"   [facility] Expiration Date 01/23/21   Short Term Goal #1 In 7-10 days: Increase bilateral LE strength 1/2 grade to facilitate independent mobility, Perform all bed mobility tasks modified independent to decrease caregiver burden, Perform all transfers modified independent to improve independence, Ambulate > 100 ft  with least restrictive assistive device modified independent w/o LOB and w/ normalized gait pattern 100% of the time, Navigate 2 stairs MOD I with unilateral handrail to facilitate return to previous living environment, Increase all balance 1/2 grade to decrease risk for falls, Complete exercise program independently and Tolerate 4 hr OOB to faciliate upright tolerance   PT Treatment Day 1   Plan   Treatment/Interventions Functional transfer training;LE strengthening/ROM; Elevations; Therapeutic exercise; Endurance training;Patient/family training;Equipment eval/education; Bed mobility;Gait training;Spoke to nursing;Spoke to case management   PT Frequency Twice a day   Recommendation   PT Discharge Recommendation Home with skilled therapy  (HHPT anticipated)   Equipment Recommended   (continue RW use)   PT - OK to Discharge No   Additional Comments pt to demonstrate consistency c level surface mobility and clear steps prior to d/c home   Rosa 8 in Bed Without Bedrails 4   Lying on Back to Sitting on Edge of Flat Bed 4   Moving Bed to Chair 4   Standing Up From Chair 4   Walk in Room 3   Climb 3-5 Stairs 3   Basic Mobility Inpatient Raw Score 22   Basic Mobility Standardized Score 47 4   Modified [place] Scale   Modified [place] Scale 3   Barthel Index   Feeding 10   Bathing 0   Grooming Score 5   Dressing Score 5   Bladder Score 0   Bowels Score 10   Toilet Use Score 5   Transfers (Bed/Chair) Score 10   Mobility (Level Surface) Score 0   Stairs Score 0   Barthel Index Score 45       Physical Therapy Treatment Note  Time In: 1139  Time Out: 1149  Total Time: 10 min     S:  Pt agreeable to PT treatment session s/p PT eval, in no apparent distress, A&O x 4 and responsive      O:  Pt seen for PT treatment session this date with interventions consisting of Therapeutic exercise consisting of: AROM 10 reps B LE in supine position  Minimal increase in pain reported throughout exercise reps  VC required for technique  Corin performed ankle pumps, glute sets, quad sets      A:  Post session: pt returned back to bed, all needs in reach and RN notified of session findings/recommendations     P:  Continue to recommend Home PT at time of d/c in order to maximize pt's functional independence and safety w/ mobility  Pt continues to be functioning below baseline level, and remains limited 2* factors listed above  PT will continue to see pt while here in order to address the deficits listed above and provide interventions consistent w/ POC in effort to achieve STGs      Laurie Ray PT

## 2021-01-14 DIAGNOSIS — Z96.652 S/P TKR (TOTAL KNEE REPLACEMENT) USING CEMENT, LEFT: Primary | ICD-10-CM

## 2021-01-14 LAB
ANION GAP SERPL CALCULATED.3IONS-SCNC: 9 MMOL/L (ref 4–13)
BUN SERPL-MCNC: 14 MG/DL (ref 7–25)
CALCIUM SERPL-MCNC: 9.5 MG/DL (ref 8.6–10.5)
CHLORIDE SERPL-SCNC: 104 MMOL/L (ref 98–107)
CO2 SERPL-SCNC: 22 MMOL/L (ref 21–31)
CREAT SERPL-MCNC: 0.94 MG/DL (ref 0.7–1.3)
GFR SERPL CREATININE-BSD FRML MDRD: 84 ML/MIN/1.73SQ M
GLUCOSE SERPL-MCNC: 120 MG/DL (ref 65–99)
HCT VFR BLD AUTO: 32 % (ref 42–47)
HGB BLD-MCNC: 10.6 G/DL (ref 14–18)
POTASSIUM SERPL-SCNC: 4 MMOL/L (ref 3.5–5.5)
SODIUM SERPL-SCNC: 135 MMOL/L (ref 134–143)

## 2021-01-14 PROCEDURE — 97530 THERAPEUTIC ACTIVITIES: CPT

## 2021-01-14 PROCEDURE — 97116 GAIT TRAINING THERAPY: CPT

## 2021-01-14 PROCEDURE — 99024 POSTOP FOLLOW-UP VISIT: CPT | Performed by: ORTHOPAEDIC SURGERY

## 2021-01-14 PROCEDURE — 80048 BASIC METABOLIC PNL TOTAL CA: CPT | Performed by: PHYSICIAN ASSISTANT

## 2021-01-14 PROCEDURE — 85018 HEMOGLOBIN: CPT | Performed by: PHYSICIAN ASSISTANT

## 2021-01-14 PROCEDURE — 97535 SELF CARE MNGMENT TRAINING: CPT

## 2021-01-14 PROCEDURE — 85014 HEMATOCRIT: CPT | Performed by: PHYSICIAN ASSISTANT

## 2021-01-14 RX ORDER — FONDAPARINUX SODIUM 2.5 MG/.5ML
2.5 INJECTION SUBCUTANEOUS EVERY 24 HOURS
Qty: 7 ML | Refills: 0 | Status: SHIPPED | OUTPATIENT
Start: 2021-01-14 | End: 2021-01-18 | Stop reason: HOSPADM

## 2021-01-14 RX ORDER — OXYCODONE HYDROCHLORIDE AND ACETAMINOPHEN 5; 325 MG/1; MG/1
2 TABLET ORAL EVERY 6 HOURS PRN
Status: DISCONTINUED | OUTPATIENT
Start: 2021-01-14 | End: 2021-01-18 | Stop reason: HOSPADM

## 2021-01-14 RX ORDER — MORPHINE SULFATE 4 MG/ML
3 INJECTION, SOLUTION INTRAMUSCULAR; INTRAVENOUS EVERY 4 HOURS PRN
Status: DISCONTINUED | OUTPATIENT
Start: 2021-01-14 | End: 2021-01-18 | Stop reason: HOSPADM

## 2021-01-14 RX ADMIN — MORPHINE SULFATE 3 MG: 4 INJECTION INTRAVENOUS at 03:14

## 2021-01-14 RX ADMIN — FOLIC ACID 1 MG: 1 TABLET ORAL at 08:46

## 2021-01-14 RX ADMIN — MORPHINE SULFATE 3 MG: 4 INJECTION INTRAVENOUS at 20:43

## 2021-01-14 RX ADMIN — OXYCODONE HYDROCHLORIDE AND ACETAMINOPHEN 500 MG: 500 TABLET ORAL at 08:46

## 2021-01-14 RX ADMIN — CEFAZOLIN SODIUM 1000 MG: 1 SOLUTION INTRAVENOUS at 08:46

## 2021-01-14 RX ADMIN — MORPHINE SULFATE 3 MG: 4 INJECTION INTRAVENOUS at 10:21

## 2021-01-14 RX ADMIN — OXYCODONE HYDROCHLORIDE AND ACETAMINOPHEN 2 TABLET: 5; 325 TABLET ORAL at 19:38

## 2021-01-14 RX ADMIN — CEFAZOLIN SODIUM 1000 MG: 1 SOLUTION INTRAVENOUS at 00:12

## 2021-01-14 RX ADMIN — OXYCODONE HYDROCHLORIDE AND ACETAMINOPHEN 2 TABLET: 5; 325 TABLET ORAL at 08:46

## 2021-01-14 RX ADMIN — DOCUSATE SODIUM 100 MG: 100 CAPSULE, LIQUID FILLED ORAL at 18:44

## 2021-01-14 RX ADMIN — DOCUSATE SODIUM 100 MG: 100 CAPSULE, LIQUID FILLED ORAL at 08:46

## 2021-01-14 RX ADMIN — FERROUS SULFATE TAB 325 MG (65 MG ELEMENTAL FE) 325 MG: 325 (65 FE) TAB at 16:05

## 2021-01-14 RX ADMIN — FERROUS SULFATE TAB 325 MG (65 MG ELEMENTAL FE) 325 MG: 325 (65 FE) TAB at 08:46

## 2021-01-14 RX ADMIN — OXYCODONE HYDROCHLORIDE AND ACETAMINOPHEN 500 MG: 500 TABLET ORAL at 18:44

## 2021-01-14 RX ADMIN — Medication 1 TABLET: at 08:46

## 2021-01-14 RX ADMIN — FONDAPARINUX SODIUM 2.5 MG: 2.5 INJECTION, SOLUTION SUBCUTANEOUS at 08:48

## 2021-01-14 NOTE — PLAN OF CARE
Problem: OCCUPATIONAL THERAPY ADULT  Goal: Performs self-care activities at highest level of function for planned discharge setting  See evaluation for individualized goals  Description: Treatment Interventions: ADL retraining, Functional transfer training, Endurance training, Equipment evaluation/education, Neuromuscular reeducation, Energy conservation     See flowsheet documentation for full assessment, interventions and recommendations  Outcome: Progressing  Note: Limitation: Decreased ADL status, Decreased Safe judgement during ADL, Decreased endurance, Decreased self-care trans, Decreased high-level ADLs  Prognosis: Good  Assessment: Patient participated in Skilled OT session this date with interventions consisting of ADL re training with the use of correct body mechnaics and safety awareness and fall prevention techniques   Patient agreeable to OT treatment session, upon arrival patient was found seated in bed (supported)  High pain level at this time  Patient requiring frequent rest periods and Assist  As noted in flow sheet secondary to pain level  Patient continues to be functioning below baseline level, occupational performance remains limited secondary to factors listed above and increased risk for falls and injury  From OT standpoint, recommendation at time of d/c would be Home with family support and Home OT (patient insisting on returning home post hospital)  Patient to benefit from continued Occupational Therapy treatment while in the hospital to address deficits as defined above and maximize level of functional independence with ADLs and functional mobility        OT Discharge Recommendation: Home with skilled therapy  OT - OK to Discharge: Yes(when medically stable)     DARREL Banuelos

## 2021-01-14 NOTE — NURSING NOTE
Pt awake, alert, and oriented x4  Denies needing anything for pain at this time  Assessment as noted in computer charting  lle surgical site dressing cdi  Phong drain capped  SS drainage noted to canister  Pt denies numbness or tingling to lle  Pt requests that cpm be removed from lle at this time  I educated him on the need for the cpm  He stated that he had enough of the cpm for now and insisted it be removed  Cpm removed  Pt encouraged to have Cpm reapplied after dinner  Safety in place

## 2021-01-14 NOTE — PLAN OF CARE
Problem: PHYSICAL THERAPY ADULT  Goal: Performs mobility at highest level of function for planned discharge setting  See evaluation for individualized goals  Description: Treatment/Interventions: Functional transfer training, LE strengthening/ROM, Elevations, Therapeutic exercise, Endurance training, Patient/family training, Equipment eval/education, Bed mobility, Gait training, Spoke to nursing, Spoke to case management  Equipment Recommended: (continue RW use)       See flowsheet documentation for full assessment, interventions and recommendations  Outcome: Progressing  Note: Prognosis: Good  Problem List: Decreased strength, Decreased range of motion, Decreased endurance, Impaired balance, Decreased mobility, Decreased safety awareness, Decreased skin integrity, Orthopedic restrictions, Pain  Assessment: Pt seen for PT treatment session this date with interventions consisting of gait training w/ emphasis on improving pt's ability to ambulate level surfaces x 30 ft with SBA provided by therapist with RW and therapeutic activity consisting of training: supine<>sit transfers, sit<>stand transfers and vc and tactile cues for static standing posture faciliation  Pt agreeable to PT treatment session upon arrival, pt found supine in bed w/ HOB elevated, in no apparent distress, A&O x 4 and responsive  In comparison to previous session, pt with improvements in tolerating increased OOB activity compared to previous date, however pt with significant L knee pain limited overall activity tolerance  Post session: pt returned BTB, all needs in reach and RN notified of session findings/recommendations  Continue to recommend Home PT at time of d/c in order to maximize pt's functional independence and safety w/ mobility  Pt continues to be functioning below baseline level, and remains limited 2* factors listed above   PT will continue to see pt while here in order to address the deficits listed above and provide interventions consistent w/ POC in effort to achieve STGs  Barriers to Discharge: Decreased caregiver support(pt lives alone, 2 ISMAEL)     PT Discharge Recommendation: (S) Home with skilled therapy(HHPT)     PT - OK to Discharge: No    See flowsheet documentation for full assessment

## 2021-01-14 NOTE — CASE MANAGEMENT
MARTINA unable to service [place]  Pt aware and will review list for more [facility] choices

## 2021-01-14 NOTE — CASE MANAGEMENT
LOS: 1, URR score: 8 and Green, Pt is not a 30 day re-admit or Medicare bundle  CM met with pt at bedside and explained role  Pt reports he lives alone in a 2 story home; 2 ISMAEL  Pt is able to stay on the 1st floor  Pt has RW, cane and crutches at home  Pt state she is completely independent with ADLs at baseline  He drives  Pt PCP is Dr Lyndsey Love  He uses [employer], pt does not have an Rx plan  Pt has history of HHC through [facility]  He denies any history of STR, MH and DA& treatment  A post acute care recommendation was made by your care team for [facility]  Discussed [place] of Choice with patient  List of agencies given to patient via in person  patient aware the list is custom filtered for them by preference  and that [facility] post acute providers are designated  He chose [facility] and referral was sent  Pt states he had his other knee done by Dr Lelo Ferreira 2 years ago and he did not prescribe Arixtra as pt could not afford same due to no Rx plan  He was sent home on ASA  CM will confirm same is plan this time  Brother will transport  CM reviewed d/c planning process including the following: identifying help at home, patient preference for d/c planning needs, availability of treatment team to discuss questions or concerns patient and/or family may have regarding understanding medications and recognizing signs and symptoms once discharged  CM also encouraged patient to follow up with all recommended appointments after discharge  Patient advised of importance for patient and family to participate in managing patients medical well being

## 2021-01-14 NOTE — PHYSICAL THERAPY NOTE
Physical Therapy Treatment Note       01/14/21 1335   PT Last Visit   PT Visit Date 01/14/21   Note Type   Note Type BID visit/treatment   Pain Assessment   Pain Assessment Tool 0-10   Pain Score 6   Pain Location/Orientation Orientation: Left; Location: Knee   Pain Onset/Description Onset: Ongoing;Frequency: Constant/Continuous; Descriptor: Aching   Patient's Stated Pain Goal No pain   Hospital Pain Intervention(s) Repositioned; Emotional support   Restrictions/Precautions   Weight Bearing Precautions Per Order Yes   LLE Weight Bearing Per Order WBAT   Other Precautions Fall Risk;Pain;Multiple lines;WBS   General   Chart Reviewed Yes   Response to Previous Treatment Patient with no complaints from previous session  Family/Caregiver Present No   Cognition   Arousal/Participation Alert; Responsive; Cooperative   Attention Within functional limits   Orientation Level Oriented X4   Memory Within functional limits   Following Commands Follows one step commands without difficulty   Comments pt agreeable to PT session   Subjective   Subjective "I can try to walk again"   Bed Mobility   Supine to Sit 5  Supervision   Additional items Assist x 1;HOB elevated; Bedrails; Increased time required   Sit to Supine 5  Supervision   Additional items Assist x 1; Increased time required;Verbal cues   Transfers   Sit to Stand 5  Supervision   Additional items Assist x 1; Increased time required;Verbal cues   Stand to Sit 5  Supervision   Additional items Assist x 1;Verbal cues   Ambulation/Elevation   Gait pattern Antalgic;Narrow BLAKE; Decreased foot clearance;Decreased L stance; Step to   Gait Assistance 5  Supervision   Additional items Assist x 1;Verbal cues; Tactile cues   Assistive Device Rolling walker   Distance 32 ft   Stair Management Assistance Not tested   Balance   Static Sitting Good   Dynamic Sitting Fair +   Static Standing Fair -   Dynamic Standing Fair -   Ambulatory Fair -   Endurance Deficit   Endurance Deficit Yes   Activity Tolerance   Activity Tolerance Patient limited by fatigue;Patient limited by pain   Nurse Made Aware Yes, RN Alicia Lee verbalized pt appropriate for PT session, made aware of outcomes/recs   Equipment Use   CPM adjusted Left ROM 0-80*   CPM adjusted Left Speed 70*/min   Comments CPM re-donned at end of session   Assessment   Prognosis Good   Problem List Decreased strength;Decreased range of motion;Decreased endurance; Impaired balance;Decreased mobility; Decreased safety awareness;Decreased skin integrity;Orthopedic restrictions;Pain   Assessment Pt seen for PT treatment session this date with interventions consisting of gait training w/ emphasis on improving pt's ability to ambulate level surfaces x 32 ft with close SUP provided by therapist with RW and therapeutic activity consisting of training: supine<>sit transfers, sit<>stand transfers and vc and tactile cues for static standing posture faciliation  Pt agreeable to PT treatment session upon arrival, pt found supine in bed w/ HOB elevated, in no apparent distress, A&O x 4 and responsive  In comparison to previous session, pt with improvements in tolerating increased OOB activity compared to previous date, however pt with significant L knee pain limited overall activity tolerance  Post session: pt returned BTB, all needs in reach and RN notified of session findings/recommendations  Continue to recommend Home PT at time of d/c in order to maximize pt's functional independence and safety w/ mobility  Pt continues to be functioning below baseline level, and remains limited 2* factors listed above  PT will continue to see pt while here in order to address the deficits listed above and provide interventions consistent w/ POC in effort to achieve STGs     Barriers to Discharge Decreased caregiver support  (pt lives alone, 2 ISMAEL)   Goals   Patient Goals "to go home"   STG Expiration Date 01/23/21   Short Term Goal #1 goals remain appropriate   PT Treatment Day 3   Plan Treatment/Interventions Functional transfer training;LE strengthening/ROM; Elevations; Therapeutic exercise; Endurance training;Patient/family training;Equipment eval/education; Bed mobility;Gait training;Spoke to nursing;Spoke to case management   Progress Slow progress, decreased activity tolerance   PT Frequency Twice a day   Recommendation   PT Discharge Recommendation Home with skilled therapy  (HHPT)   Equipment Recommended   (continue RW use; pt has RW at home)   PT - OK to Discharge No   Additional Comments pt to demonstrate consistency c level surface mobility and clear steps prior to d/c home       Zelalem Love, PT    Time of PT treatment session: [id number]

## 2021-01-14 NOTE — PLAN OF CARE
Problem: PHYSICAL THERAPY ADULT  Goal: Performs mobility at highest level of function for planned discharge setting  See evaluation for individualized goals  Description: Treatment/Interventions: Functional transfer training, LE strengthening/ROM, Elevations, Therapeutic exercise, Endurance training, Patient/family training, Equipment eval/education, Bed mobility, Gait training, Spoke to nursing, Spoke to case management  Equipment Recommended: (continue RW use)       See flowsheet documentation for full assessment, interventions and recommendations  1/14/2021 1339 by Caitlin Mendez PT  Outcome: Progressing  Note: Prognosis: Good  Problem List: Decreased strength, Decreased range of motion, Decreased endurance, Impaired balance, Decreased mobility, Decreased safety awareness, Decreased skin integrity, Orthopedic restrictions, Pain  Assessment: Pt seen for PT treatment session this date with interventions consisting of gait training w/ emphasis on improving pt's ability to ambulate level surfaces x 32 ft with close SUP provided by therapist with RW and therapeutic activity consisting of training: supine<>sit transfers, sit<>stand transfers and vc and tactile cues for static standing posture faciliation  Pt agreeable to PT treatment session upon arrival, pt found supine in bed w/ HOB elevated, in no apparent distress, A&O x 4 and responsive  In comparison to previous session, pt with improvements in tolerating increased OOB activity compared to previous date, however pt with significant L knee pain limited overall activity tolerance  Post session: pt returned BTB, all needs in reach and RN notified of session findings/recommendations  Continue to recommend Home PT at time of d/c in order to maximize pt's functional independence and safety w/ mobility  Pt continues to be functioning below baseline level, and remains limited 2* factors listed above   PT will continue to see pt while here in order to address the deficits listed above and provide interventions consistent w/ POC in effort to achieve STGs  Barriers to Discharge: Decreased caregiver support(pt lives alone, 2 ISMAEL)     PT Discharge Recommendation: Home with skilled therapy(HHPT)     PT - OK to Discharge: No    See flowsheet documentation for full assessment  1/14/2021 1141 by Stefan Fierro PT  Outcome: Progressing  Note: Prognosis: Good  Problem List: Decreased strength, Decreased range of motion, Decreased endurance, Impaired balance, Decreased mobility, Decreased safety awareness, Decreased skin integrity, Orthopedic restrictions, Pain  Assessment: Pt seen for PT treatment session this date with interventions consisting of gait training w/ emphasis on improving pt's ability to ambulate level surfaces x 30 ft with SBA provided by therapist with RW and therapeutic activity consisting of training: supine<>sit transfers, sit<>stand transfers and vc and tactile cues for static standing posture faciliation  Pt agreeable to PT treatment session upon arrival, pt found supine in bed w/ HOB elevated, in no apparent distress, A&O x 4 and responsive  In comparison to previous session, pt with improvements in tolerating increased OOB activity compared to previous date, however pt with significant L knee pain limited overall activity tolerance  Post session: pt returned BTB, all needs in reach and RN notified of session findings/recommendations  Continue to recommend Home PT at time of d/c in order to maximize pt's functional independence and safety w/ mobility  Pt continues to be functioning below baseline level, and remains limited 2* factors listed above  PT will continue to see pt while here in order to address the deficits listed above and provide interventions consistent w/ POC in effort to achieve STGs    Barriers to Discharge: Decreased caregiver support(pt lives alone, 2 ISMAEL)     PT Discharge Recommendation: (S) Home with skilled therapy(HHPT)     PT - OK to Discharge: No    See flowsheet documentation for full assessment

## 2021-01-14 NOTE — OCCUPATIONAL THERAPY NOTE
01/14/21 0910   OT Last Visit   OT Visit Date 01/14/21   Note Type   Note Type Treatment   Restrictions/Precautions   Weight Bearing Precautions Per Order Yes   LLE Weight Bearing Per Order WBAT   Other Precautions Multiple lines; Fall Risk;Pain   General   Response to Previous Treatment   (patient reports with prev  TKR the CPM was helpful )   Lifestyle   Reciprocal Relationships his son and his brother are involved in helping him    Intrinsic Gratification hunting, fishing, and golf - "I want to get back to hunting with my grandsons and their bird dog   Pain Assessment   Pain Assessment Tool 0-10   Pain Score   (9 5)   Hospital Pain Intervention(s) Medication (See MAR); Emotional support  (pain decreased to 7 by end of session )   ADL   Where Assessed Other (Comment)  (sitting in bed)   Equipment Provided   (not interested in (feels he doesn't need) AE)   Grooming Comments patient left dentures at home    UB Bathing Assistance 5  Supervision/Setup   UB Bathing Deficit Setup; Increased time to complete   LB Bathing Assistance 3  Moderate Assistance   LB Bathing Deficit Setup;Verbal cueing; Increased time to complete;Right lower leg including foot; Left lower leg including foot   LB Bathing Comments patient deferred perianal areas at this time - "I was washed there recently"    Bed Mobility   Additional Comments patient cited increased pain with any movement so he remained in place for session    Therapeutic Excerise-Strength   UE Strength   (reiterated need for UE movements while in bed )   Coordination   Gross Motor WFL   (patient does exer  for  Left Rotator cuff to preserve ROM )   Dexterity WFL   Cognition   Overall Cognitive Status WFL   Arousal/Participation Responsive; Cooperative   Attention Within functional limits   Orientation Level Oriented X4   Memory Within functional limits   Following Commands Follows one step commands without difficulty   Activity Tolerance   Activity Tolerance Patient limited by pain  (reports willingness to work with PT on their return )   Assessment   Assessment Patient participated in Skilled OT session this date with interventions consisting of ADL re training with the use of correct body mechnaics and safety awareness and fall prevention techniques   Patient agreeable to OT treatment session, upon arrival patient was found seated in bed (supported)  High pain level at this time  Patient requiring frequent rest periods and Assist  As noted in flow sheet secondary to pain level  Patient continues to be functioning below baseline level, occupational performance remains limited secondary to factors listed above and increased risk for falls and injury  From OT standpoint, recommendation at time of d/c would be Home with family support and Home OT (patient insisting on returning home post hospital)  Patient to benefit from continued Occupational Therapy treatment while in the hospital to address deficits as defined above and maximize level of functional independence with ADLs and functional mobility  Plan   Treatment Interventions ADL retraining;UE strengthening/ROM; Activityengagement   Goal Expiration Date 01/23/21   OT Treatment Day 172 GRAEME Rahman/L

## 2021-01-14 NOTE — PHYSICAL THERAPY NOTE
Physical Therapy Treatment Note       01/14/21 1042   PT Last Visit   PT Visit Date 01/14/21   Note Type   Note Type Treatment   Pain Assessment   Pain Assessment Tool 0-10   Pain Score 8   Pain Location/Orientation Orientation: Left; Location: Knee   Pain Onset/Description Onset: Ongoing;Frequency: Constant/Continuous; Descriptor: Aching   Patient's Stated Pain Goal No pain   Hospital Pain Intervention(s) Repositioned; Ambulation/increased activity; Emotional support;Rest;Elevated   Restrictions/Precautions   Weight Bearing Precautions Per Order Yes   LLE Weight Bearing Per Order WBAT   Other Precautions Fall Risk;Pain;Multiple lines;WBS   General   Chart Reviewed Yes   Response to Previous Treatment Patient with no complaints from previous session  Family/Caregiver Present No   Cognition   Arousal/Participation Alert; Responsive; Cooperative   Attention Within functional limits   Orientation Level Oriented X4   Memory Within functional limits   Following Commands Follows one step commands without difficulty   Comments pt agreeable to PT session   Subjective   Subjective "I have alot of pain today"   Bed Mobility   Supine to Sit 5  Supervision   Additional items Assist x 1; Increased time required;Verbal cues   Sit to Supine 4  Minimal assistance   Additional items Assist x 1; Increased time required;Verbal cues;LE management  (L LE management needed)   Transfers   Sit to Stand 5  Supervision   Additional items Assist x 1; Increased time required;Verbal cues   Stand to Sit 5  Supervision   Additional items Assist x 1; Increased time required;Verbal cues   Ambulation/Elevation   Gait pattern Antalgic;Narrow BLAKE; Decreased foot clearance;Decreased L stance; Step to   Gait Assistance 5  Supervision  (SBA)   Additional items Assist x 1;Verbal cues   Assistive Device Rolling walker   Distance 30 ft   Stair Management Assistance Not tested   Balance   Static Sitting Good   Dynamic Sitting Fair +   Static Standing Fair -   Dynamic Standing Fair -   Ambulatory Fair -   Endurance Deficit   Endurance Deficit Yes   Activity Tolerance   Activity Tolerance Patient limited by fatigue;Patient limited by pain   Nurse Made Aware Yes, RN Mike Share verbalized pt appropriate for PT session, made aware of outcomes/recs   Equipment Use   CPM adjusted Left ROM 0-80*   CPM adjusted Left Speed 70*/min   Comments current CPM machine not working  PT obtained a replacement CPM machine and fitted for pt  Donned at end of session cycling 0-80* per protocol, no patient complaints at this time  Assessment   Prognosis Good   Problem List Decreased strength;Decreased range of motion;Decreased endurance; Impaired balance;Decreased mobility; Decreased safety awareness;Decreased skin integrity;Orthopedic restrictions;Pain   Assessment Pt seen for PT treatment session this date with interventions consisting of gait training w/ emphasis on improving pt's ability to ambulate level surfaces x 30 ft with SBA provided by therapist with RW and therapeutic activity consisting of training: supine<>sit transfers, sit<>stand transfers and vc and tactile cues for static standing posture faciliation  Pt agreeable to PT treatment session upon arrival, pt found supine in bed w/ HOB elevated, in no apparent distress, A&O x 4 and responsive  In comparison to previous session, pt with improvements in tolerating increased OOB activity compared to previous date, however pt with significant L knee pain limited overall activity tolerance  Post session: pt returned BTB, all needs in reach and RN notified of session findings/recommendations  Continue to recommend Home PT at time of d/c in order to maximize pt's functional independence and safety w/ mobility  Pt continues to be functioning below baseline level, and remains limited 2* factors listed above   PT will continue to see pt while here in order to address the deficits listed above and provide interventions consistent w/ POC in effort to achieve STGs  Barriers to Discharge Decreased caregiver support  (pt lives alone, 2 ISMAEL)   Goals   Patient Goals "to return home"   STG Expiration Date 01/23/21   Short Term Goal #1 goals remain appropriate   PT Treatment Day 2   Plan   Treatment/Interventions Functional transfer training;LE strengthening/ROM; Elevations; Therapeutic exercise; Endurance training;Patient/family training;Equipment eval/education; Bed mobility;Gait training;Spoke to nursing;Spoke to case management   Progress Slow progress, decreased activity tolerance   PT Frequency Twice a day   Recommendation   PT Discharge Recommendation Home with skilled therapy  (HHPT)   Equipment Recommended   (continue RW use; pt has RW at home)   PT - OK to Discharge No   Additional Comments pt to demonstrate consistency c level surface mobility and clear steps prior to d/c home       Grant Reyes PT    Time of PT treatment session: [id number]

## 2021-01-14 NOTE — PROGRESS NOTES
Progress Note - Orthopedics   Liliana Feliciano 77 y o  male MRN: [id number]  Unit/Bed#: -01 Encounter: [id number]    Assessment:  POD 1 s/p left total knee replacement    Plan:  Continue Arixtra for DVT prophylaxis  Continue physical therapy and occupational therapy weight-bearing as tolerated  Increase Percocet to 2 tabs q6hr  Change dressing tomorrow  Remove leg drain tomorrow  DC planning    Weight bearing:  Weight-bearing as tolerated    VTE Pharmacologic Prophylaxis: Fondaparinux (Arixtra)  VTE Mechanical Prophylaxis: sequential compression device    Subjective:  Complains of pain this morning  Therapy reports he did well yesterday  Vitals: Blood pressure 123/87, pulse 96, temperature 99 2 °F (37 3 °C), temperature source Temporal, resp  rate 20, height 5' 6" (1 676 m), weight 88 5 kg (195 lb), SpO2 96 %  ,Body mass index is 31 47 kg/m²        Intake/Output Summary (Last 24 hours) at 1/14/2021 0819  Last data filed at 1/14/2021 0601  Gross per 24 hour   Intake 1240 ml   Output 2100 ml   Net -860 ml       Invasive Devices     Peripheral Intravenous Line            Peripheral IV 01/13/21 Left Hand 1 day          Drain            Autologus Reinfusion/Drain Device 1 Left Knee less than 1 day                Physical Exam:  Left lower extremity is neurovascularly intact  Toes are pink and mobile  Compartments are soft  Dressing is clean, dry intact  Sensation intact  Brisk cap refill  Good dorsiflexion and plantar flexion of ankle  Negative Homans    Lab, Imaging and other studies:   CBC:   Lab Results   Component Value Date    HGB 10 6 (L) 01/14/2021    HCT 32 0 (L) 01/14/2021     CMP:   Lab Results   Component Value Date    SODIUM 135 01/14/2021     01/14/2021    CO2 22 01/14/2021    BUN 14 01/14/2021    CREATININE 0 94 01/14/2021    CALCIUM 9 5 01/14/2021    EGFR 84 01/14/2021

## 2021-01-15 LAB
ANION GAP SERPL CALCULATED.3IONS-SCNC: 11 MMOL/L (ref 4–13)
BUN SERPL-MCNC: 17 MG/DL (ref 7–25)
CALCIUM SERPL-MCNC: 9.7 MG/DL (ref 8.6–10.5)
CHLORIDE SERPL-SCNC: 101 MMOL/L (ref 98–107)
CO2 SERPL-SCNC: 22 MMOL/L (ref 21–31)
CREAT SERPL-MCNC: 1.11 MG/DL (ref 0.7–1.3)
GFR SERPL CREATININE-BSD FRML MDRD: 68 ML/MIN/1.73SQ M
GLUCOSE SERPL-MCNC: 115 MG/DL (ref 65–99)
HCT VFR BLD AUTO: 29.8 % (ref 42–47)
HGB BLD-MCNC: 9.7 G/DL (ref 14–18)
POTASSIUM SERPL-SCNC: 3.8 MMOL/L (ref 3.5–5.5)
SODIUM SERPL-SCNC: 134 MMOL/L (ref 134–143)

## 2021-01-15 PROCEDURE — 97116 GAIT TRAINING THERAPY: CPT

## 2021-01-15 PROCEDURE — NC001 PR NO CHARGE: Performed by: ORTHOPAEDIC SURGERY

## 2021-01-15 PROCEDURE — 97530 THERAPEUTIC ACTIVITIES: CPT

## 2021-01-15 PROCEDURE — 85018 HEMOGLOBIN: CPT | Performed by: PHYSICIAN ASSISTANT

## 2021-01-15 PROCEDURE — 85014 HEMATOCRIT: CPT | Performed by: PHYSICIAN ASSISTANT

## 2021-01-15 PROCEDURE — 99024 POSTOP FOLLOW-UP VISIT: CPT | Performed by: ORTHOPAEDIC SURGERY

## 2021-01-15 PROCEDURE — 80048 BASIC METABOLIC PNL TOTAL CA: CPT | Performed by: PHYSICIAN ASSISTANT

## 2021-01-15 PROCEDURE — 97110 THERAPEUTIC EXERCISES: CPT

## 2021-01-15 RX ADMIN — OXYCODONE HYDROCHLORIDE AND ACETAMINOPHEN 500 MG: 500 TABLET ORAL at 17:13

## 2021-01-15 RX ADMIN — OXYCODONE HYDROCHLORIDE AND ACETAMINOPHEN 500 MG: 500 TABLET ORAL at 08:51

## 2021-01-15 RX ADMIN — ACETAMINOPHEN 650 MG: 325 TABLET ORAL at 02:52

## 2021-01-15 RX ADMIN — OXYCODONE HYDROCHLORIDE AND ACETAMINOPHEN 2 TABLET: 5; 325 TABLET ORAL at 08:51

## 2021-01-15 RX ADMIN — DOCUSATE SODIUM 100 MG: 100 CAPSULE, LIQUID FILLED ORAL at 08:51

## 2021-01-15 RX ADMIN — FOLIC ACID 1 MG: 1 TABLET ORAL at 08:52

## 2021-01-15 RX ADMIN — DOCUSATE SODIUM 100 MG: 100 CAPSULE, LIQUID FILLED ORAL at 17:13

## 2021-01-15 RX ADMIN — Medication 1 TABLET: at 08:50

## 2021-01-15 RX ADMIN — OXYCODONE HYDROCHLORIDE AND ACETAMINOPHEN 2 TABLET: 5; 325 TABLET ORAL at 17:14

## 2021-01-15 RX ADMIN — FONDAPARINUX SODIUM 2.5 MG: 2.5 INJECTION, SOLUTION SUBCUTANEOUS at 08:52

## 2021-01-15 RX ADMIN — FERROUS SULFATE TAB 325 MG (65 MG ELEMENTAL FE) 325 MG: 325 (65 FE) TAB at 08:51

## 2021-01-15 RX ADMIN — OXYCODONE HYDROCHLORIDE AND ACETAMINOPHEN 2 TABLET: 5; 325 TABLET ORAL at 04:18

## 2021-01-15 RX ADMIN — MORPHINE SULFATE 3 MG: 4 INJECTION INTRAVENOUS at 06:51

## 2021-01-15 RX ADMIN — FERROUS SULFATE TAB 325 MG (65 MG ELEMENTAL FE) 325 MG: 325 (65 FE) TAB at 17:13

## 2021-01-15 NOTE — OCCUPATIONAL THERAPY NOTE
01/15/21 1000   OT Last Visit   OT Visit Date 01/15/21   Note Type   Note Type Treatment   Restrictions/Precautions   Weight Bearing Precautions Per Order Yes   LLE Weight Bearing Per Order WBAT   Other Precautions Fall Risk;Pain   General   Response to Previous Treatment Patient with no complaints from previous session   Pain Assessment   Pain Assessment Tool   (had minimal discomfort post removal of drain to L knee )   ADL   Where Assessed   (uninterested in full bath because "I'll be home tomorrow" )   LB Bathing Comments was Dep  for L lower leg and foot    Bed Mobility   Supine to Sit 5  Supervision   Additional items Assist x 1;HOB elevated; Bedrails; Increased time required  (uses opposite(R)leg as leg  )   Sit to Supine 5  Supervision   Additional items HOB elevated; Bedrails; Increased time required   Transfers   Sit to Stand 4  Minimal assistance   Additional items Assist x 1;Bedrails; Increased time required;Verbal cues; Other  (walker support-dictated by patient )   Stand to Sit 4  Minimal assistance   Additional items Assist x 1;Bedrails; Increased time required;Verbal cues   Additional Comments patient stood for approx  4 minutes , and took 3-4 steps forward and back from bed   (is impulsive-- at times ignoring direction of therapist )   Coordination   Gross Motor [facility]   Dexterity WFL   Cognition   Overall Cognitive Status [facility]   Arousal/Participation Alert; Cooperative  (selectively cooperative )   Attention Within functional limits   Following Commands Follows all commands and directions without difficulty   Comments cites motivation for improvement and return to baseline functioning (has active hobbies to enjoy)   Activity Tolerance   Activity Tolerance Patient limited by pain  (much less pain today )   Assessment   Assessment Patient participated in Skilled OT session this date with interventions consisting of safety awareness and fall prevention techniques and  therapeutic activities to: increase activity tolerance, increase standing tolerance, and  Increase sitting (EOB) tolerance   Patient agreeable to OT treatment session, upon arrival patient was found seated in bed (supported)  In comparison to previous session, patient with improvements in functional mobility (including tolerance) for transfers  Patient requiring ocassional safety reminders   Patient continues to be functioning below baseline level, occupational performance remains limited secondary to factors listed above and increased risk for falls and injury  From OT standpoint, recommendation at time of d/c would be Home OT to maximize accomplishment and safety in home environment  Patient to benefit from continued Occupational Therapy treatment while in the hospital to address deficits as defined above and maximize level of functional independence with ADLs and functional mobility  Plan   Treatment Interventions Functional transfer training;Patient/family training; Activityengagement  (safety empasized)   Goal Expiration Date 01/23/21   OT Treatment Day 2   DARREL Zavala    ADDENDUM:  Provided with "After Your Knee Replacement" handout for home reference

## 2021-01-15 NOTE — PHYSICAL THERAPY NOTE
01/15/21 1300   PT Last Visit   PT Visit Date 01/15/21   Note Type   Note Type Treatment   Pain Assessment   Pain Assessment Tool 0-10   Pain Score 4   Pain Location/Orientation Orientation: Left; Location: Knee   Restrictions/Precautions   Weight Bearing Precautions Per Order Yes   LLE Weight Bearing Per Order WBAT   Other Precautions Fall Risk;Pain   General   Chart Reviewed Yes   Response to Previous Treatment Patient with no complaints from previous session  Family/Caregiver Present No   Cognition   Overall Cognitive Status WFL   Arousal/Participation Alert; Cooperative   Attention Within functional limits   Orientation Level Oriented X4   Memory Within functional limits   Following Commands Follows all commands and directions without difficulty   Comments pt agreeable to PT session   Bed Mobility   Supine to Sit 5  Supervision   Additional items Assist x 1;HOB elevated; Bedrails; Increased time required;Verbal cues   Sit to Supine 5  Supervision   Additional items HOB elevated; Bedrails; Increased time required;Verbal cues   Additional Comments pt sat at EOBx23 minutes to perform LLE ther ex   Transfers   Sit to Stand 5  Supervision   Additional items Assist x 1;Bedrails; Increased time required;Verbal cues   Stand to Sit 5  Supervision   Additional items Assist x 1;Bedrails; Increased time required;Verbal cues   Ambulation/Elevation   Gait pattern Improper Weight shift;Decreased foot clearance;Decreased L stance; Step to;Excessively slow   Gait Assistance   (CGA)   Additional items Assist x 1;Verbal cues   Assistive Device Rolling walker   Distance 4 steps forward and back   Stair Management Assistance   (not appropriate at this time)   Balance   Static Sitting Good   Dynamic Sitting Fair +   Static Standing Fair   Dynamic Standing Fair   Ambulatory Fair -   Endurance Deficit   Endurance Deficit Yes   Activity Tolerance   Activity Tolerance Patient limited by fatigue;Patient limited by pain   Nurse Made Aware RN made aware of outcome   Exercises   Heelslides Sitting;20 reps;AAROM; Left   Glute Sets Sitting;20 reps   Hip Abduction Sitting;20 reps;AROM; Bilateral   Hip Adduction Sitting;20 reps;AROM; Bilateral   Knee AROM Long Arc Quad Sitting;20 reps;AAROM; Left   Ankle Pumps Sitting;20 reps;AROM; Bilateral   Marching Sitting;20 reps;AROM; Left   Equipment Use   CPM adjusted Left ROM 0-90 degrees   Assessment   Prognosis Good   Problem List Decreased strength;Decreased range of motion; Impaired balance;Decreased mobility; Decreased safety awareness;Orthopedic restrictions;Pain   Assessment Pt seen for PT treatment session this date with interventions consisting of gait training w/ emphasis on improving pt's ability to ambulate level surfaces x 4 ft front and back with CGA provided by therapist with RW, Therapeutic exercise consisting of: AROM and AAROM 20 reps B LE and L LE in seated at EOB position and therapeutic activity consisting of training: bed mobility, supine<>sit transfers, sit<>stand transfers and static sitting tolerance at EOB for 24 minutes w/ no UE support  Pt agreeable to PT treatment session upon arrival, pt found supine in bed w/ HOB elevated, in no apparent distress and responsive  In comparison to previous session, pt with improvements in ability to take a step  Post session: pt returned BTB, all needs in reach and RN notified of session findings/recommendations Continue to recommend Home PT vs  STR at time of d/c in order to maximize pt's functional independence and safety w/ mobility  Pt continues to be functioning below baseline level, and remains limited 2* factors listed above and including decreased functional mobility, decreased ROM and decreased strength  PT will continue to see pt while here in order to address the deficits listed above and provide interventions consistent w/ POC in effort to achieve STGs     Barriers to Discharge Decreased caregiver support   Goals   PT Treatment Day 5   Plan Treatment/Interventions Functional transfer training;LE strengthening/ROM; Therapeutic exercise; Endurance training;Bed mobility;Gait training   Progress Slow progress, decreased activity tolerance   PT Frequency Twice a day   Recommendation   PT Discharge Recommendation Home with skilled therapy  (vs STR)   Equipment Recommended Walker   PT - OK to Discharge No   Additional Comments attempt stairs next visit, upon conclusion, pt returned to bed with all needs in reach

## 2021-01-15 NOTE — NURSING NOTE
Patient pleasant, alert and oriented x4  C/o left knee discomfort, medicated for same see MAR for details  Complex physical assessment as noted, see chart for details  Call bell in reach  Verbalizes needs

## 2021-01-15 NOTE — PHYSICAL THERAPY NOTE
01/15/21 0827   PT Last Visit   PT Visit Date 01/15/21   Note Type   Note Type Treatment   Pain Assessment   Pain Assessment Tool 0-10   Pain Score 8   Pain Location/Orientation Orientation: Left; Location: Knee   Pain Radiating Towards L ankle   Pain Onset/Description Onset: Ongoing;Frequency: Constant/Continuous   Patient's Stated Pain Goal No pain   Hospital Pain Intervention(s) Repositioned; Emotional support   Restrictions/Precautions   Weight Bearing Precautions Per Order Yes   LLE Weight Bearing Per Order WBAT   Other Precautions Multiple lines; Fall Risk;Pain   General   Chart Reviewed Yes   Response to Previous Treatment Patient with no complaints from previous session  Family/Caregiver Present No   Cognition   Overall Cognitive Status WFL   Arousal/Participation Alert; Responsive; Cooperative   Attention Within functional limits   Orientation Level Oriented X4   Memory Within functional limits   Following Commands Follows all commands and directions without difficulty   Comments pt agreeable to PT session   Subjective   Subjective "This bandage is really hurting, it feels like its rubbing"   Bed Mobility   Rolling R 6  Modified independent   Additional items HOB elevated; Bedrails; Increased time required;Verbal cues   Rolling L 6  Modified independent   Additional items HOB elevated; Bedrails; Increased time required;Verbal cues   Supine to Sit 5  Supervision   Additional items Assist x 1;Bedrails; Increased time required;Verbal cues   Sit to Supine 5  Supervision   Additional items Assist x 1; Increased time required; Bedrails;Verbal cues   Additional Comments pt sat at EOBx15 minutes with increased c/o pain   Transfers   Sit to Stand   (attempted several trials, pt unable to achieve full standing)   Ambulation/Elevation   Gait pattern Not tested  (pt unable to stand secondary to c/o increased pain)   Balance   Static Sitting Good   Dynamic Sitting Fair +   Endurance Deficit   Endurance Deficit Yes Activity Tolerance   Activity Tolerance Patient limited by pain   Nurse Made Aware RN made aware of outcomes   Exercises   Quad Sets Supine;20 reps;AROM; Left   Heelslides Supine;20 reps;AAROM; Left   Glute Sets Supine;20 reps   Hip Flexion Supine;20 reps;AROM; Left  (SLR)   Hip Abduction Supine;20 reps;AROM; Left   Hip Adduction Supine;20 reps;AROM; Left   Knee AROM Short Arc Quad Supine;20 reps;AROM; Left   Ankle Pumps Supine;20 reps;AROM; Bilateral   Assessment   Prognosis Good   Problem List Decreased strength;Decreased range of motion;Decreased endurance; Impaired balance;Decreased mobility; Decreased safety awareness;Orthopedic restrictions;Pain   Assessment Pt seen for PT treatment session this date with interventions consisting of Therapeutic exercise consisting of: AROM and AAROM 20 reps L LE in supine position and therapeutic activity consisting of training: bed mobility, supine<>sit transfers, sit<>stand transfers, static sitting tolerance at EOB for 15 minutes w/ min UE support and several attempts made for sit to stand transfer, pt unable to achieve full stand secondary to c/o increased pain in L knee  Pt agreeable to PT treatment session upon arrival, pt found supine in bed w/ HOB elevated, in no apparent distress, A&O x 4 and responsive  In comparison to previous session, pt with no improvements as evidenced by pt unable to stand secondary to c/o increased pain  Post session: pt returned BTB, all needs in reach and RN notified of session findings/recommendations Continue to recommend Home PT at time of d/c in order to maximize pt's functional independence and safety w/ mobility  Pt continues to be functioning below baseline level, and remains limited 2* factors listed above and including decreased functional mobility, decreased ROM and pain  PT will continue to see pt while here in order to address the deficits listed above and provide interventions consistent w/ POC in effort to achieve STGs     Barriers to Discharge Decreased caregiver support   Goals   Patient Goals to go home   PT Treatment Day 4   Plan   Treatment/Interventions Functional transfer training;LE strengthening/ROM; Therapeutic exercise; Endurance training;Gait training   Progress No functional improvements   PT Frequency Twice a day   Recommendation   PT Discharge Recommendation Home with skilled therapy   PT - OK to Discharge No   Additional Comments upon conclusion, pt returned to supine in bed with all needs in reach and breakfast set up

## 2021-01-15 NOTE — DISCHARGE SUMMARY
Discharge Summary - Roberto Ohara 77 y o  male MRN: [id number]    Unit/Bed#: -01 Encounter: [id number]    Admission Date:   Admission Orders (From admission, onward)     Ordered        01/13/21 1348  Inpatient Admission  Once                     Admitting Diagnosis: Osteoarthritis of left knee, unspecified osteoarthritis type [M17 12]    Procedures Performed:  Elective left total knee replacement    Summary of Hospital Course:   47-year-old male presents to the hospital for elective left total knee replacement  The patient tolerated the procedure well  On postop day 1, the patient was placed on Arixtra for DVT prophylaxis  He began working with Physical therapy and Occupational therapy weight-bearing as tolerated  On postop day 2, his dressing was changed and his leg drain was removed  His incision was clean, dry and intact  Postop day 3, he was then deemed suitable for discharge to home with home health care  The patient will be discharged home on aspirin 325 mg b i d for DVT prophylaxis  The patient was discharged with pain medication, vitamins, DVT prophylaxis, and also an elevated commode and walker  The patient was instructed to paint incision with betadine three times per day  Discharge Diagnosis:  Primary osteoarthritis of the left knee, status post left total knee replacement  Discharge date:  01/16/2021      Condition at Discharge: stable         Discharge instructions/Information to patient and family:   See after visit summary for information provided to patient and family  Provisions for Follow-Up Care:  See after visit summary for information related to follow-up care and any pertinent home health orders  PCP: Meena Ambrocio MD    Disposition: Home with home health care    Planned Readmission: No      Discharge Statement   I spent 30 minutes discharging the patient  This time was spent on the day of discharge   I had direct contact with the patient on the day of discharge  Additional documentation is required if more than 30 minutes were spent on discharge  Discharge Medications:  See after visit summary for reconciled discharge medications provided to patient and family

## 2021-01-15 NOTE — PROGRESS NOTES
Progress Note - Orthopedics   Candy Rodas 77 y o  male MRN: [id number]  Unit/Bed#: -01 Encounter: [id number]    Assessment:  Postop day #2, status post left total knee replacement    Plan:  Out of bed  Weightbearing as tolerated left lower extremity  PT/OT  Arixtra for DVT prophylaxis  Anticipate discharge tomorrow morning with home health care    Weight bearing:  As tolerated    VTE Pharmacologic Prophylaxis: Fondaparinux (Arixtra)  VTE Mechanical Prophylaxis: sequential compression device    Subjective:  States knee pain is lessening  Denies numbness or tingling  Starting to ambulate more  Vitals: Blood pressure 110/63, pulse 88, temperature 98 7 °F (37 1 °C), temperature source Temporal, resp  rate 18, height 5' 6" (1 676 m), weight 88 5 kg (195 lb), SpO2 97 %  ,Body mass index is 31 47 kg/m²  Intake/Output Summary (Last 24 hours) at 1/15/2021 1021  Last data filed at 1/15/2021 0043  Gross per 24 hour   Intake 240 ml   Output 980 ml   Net -740 ml       Invasive Devices     Peripheral Intravenous Line            Peripheral IV 01/13/21 Left Hand 2 days          Drain            Autologus Reinfusion/Drain Device 1 Left Knee 1 day                Physical Exam:   Ortho Exam:     Left lower extremity is neurovascular intact  Toes are pink and mobile  Compartments are soft  Range of motion of his knee is from 5-100 degrees  Incision is clean, dry, intact  Negative Homans  Arc of motion is improved  Neurologically intact distally      Lab, Imaging and other studies:   CBC:   Lab Results   Component Value Date    HGB 9 7 (L) 01/15/2021    HCT 29 8 (L) 01/15/2021

## 2021-01-15 NOTE — PLAN OF CARE
Problem: PHYSICAL THERAPY ADULT  Goal: Performs mobility at highest level of function for planned discharge setting  See evaluation for individualized goals  Description: Treatment/Interventions: Functional transfer training, LE strengthening/ROM, Elevations, Therapeutic exercise, Endurance training, Patient/family training, Equipment eval/education, Bed mobility, Gait training, Spoke to nursing, Spoke to case management  Equipment Recommended: (continue RW use)       See flowsheet documentation for full assessment, interventions and recommendations  1/15/2021 1515 by Bekah Adams PTA  Outcome: Progressing  Note: Prognosis: Good  Problem List: Decreased strength, Decreased range of motion, Impaired balance, Decreased mobility, Decreased safety awareness, Orthopedic restrictions, Pain  Assessment: Pt seen for PT treatment session this date with interventions consisting of gait training w/ emphasis on improving pt's ability to ambulate level surfaces x 4 ft front and back with CGA provided by therapist with RW, Therapeutic exercise consisting of: AROM and AAROM 20 reps B LE and L LE in seated at EOB position and therapeutic activity consisting of training: bed mobility, supine<>sit transfers, sit<>stand transfers and static sitting tolerance at EOB for 24 minutes w/ no UE support  Pt agreeable to PT treatment session upon arrival, pt found supine in bed w/ HOB elevated, in no apparent distress and responsive  In comparison to previous session, pt with improvements in ability to take a step  Post session: pt returned BTB, all needs in reach and RN notified of session findings/recommendations Continue to recommend Home PT vs  STR at time of d/c in order to maximize pt's functional independence and safety w/ mobility  Pt continues to be functioning below baseline level, and remains limited 2* factors listed above and including decreased functional mobility, decreased ROM and decreased strength   PT will continue to see pt while here in order to address the deficits listed above and provide interventions consistent w/ POC in effort to achieve STGs  Barriers to Discharge: Decreased caregiver support     PT Discharge Recommendation: Home with skilled therapy(vs STR)     PT - OK to Discharge: No    See flowsheet documentation for full assessment  1/15/2021 0850 by Cb Cruz PTA  Outcome: Not Progressing  Note: Prognosis: Good  Problem List: Decreased strength, Decreased range of motion, Decreased endurance, Impaired balance, Decreased mobility, Decreased safety awareness, Orthopedic restrictions, Pain  Assessment: Pt seen for PT treatment session this date with interventions consisting of Therapeutic exercise consisting of: AROM and AAROM 20 reps L LE in supine position and therapeutic activity consisting of training: bed mobility, supine<>sit transfers, sit<>stand transfers, static sitting tolerance at EOB for 15 minutes w/ min UE support and several attempts made for sit to stand transfer, pt unable to achieve full stand secondary to c/o increased pain in L knee  Pt agreeable to PT treatment session upon arrival, pt found supine in bed w/ HOB elevated, in no apparent distress, A&O x 4 and responsive  In comparison to previous session, pt with no improvements as evidenced by pt unable to stand secondary to c/o increased pain  Post session: pt returned BTB, all needs in reach and RN notified of session findings/recommendations Continue to recommend Home PT at time of d/c in order to maximize pt's functional independence and safety w/ mobility  Pt continues to be functioning below baseline level, and remains limited 2* factors listed above and including decreased functional mobility, decreased ROM and pain  PT will continue to see pt while here in order to address the deficits listed above and provide interventions consistent w/ POC in effort to achieve STGs    Barriers to Discharge: Decreased caregiver support     PT Discharge Recommendation: Home with skilled therapy     PT - OK to Discharge: No    See flowsheet documentation for full assessment

## 2021-01-15 NOTE — PLAN OF CARE
Problem: OCCUPATIONAL THERAPY ADULT  Goal: Performs self-care activities at highest level of function for planned discharge setting  See evaluation for individualized goals  Description: Treatment Interventions: ADL retraining, Functional transfer training, Endurance training, Equipment evaluation/education, Neuromuscular reeducation, Energy conservation     See flowsheet documentation for full assessment, interventions and recommendations  Outcome: Progressing  Note: Limitation: Decreased ADL status, Decreased Safe judgement during ADL, Decreased endurance, Decreased self-care trans, Decreased high-level ADLs  Prognosis: Good  Assessment: Patient participated in Skilled OT session this date with interventions consisting of safety awareness and fall prevention techniques and  therapeutic activities to: increase activity tolerance, increase standing tolerance, and  Increase sitting (EOB) tolerance   Patient agreeable to OT treatment session, upon arrival patient was found seated in bed (supported)  In comparison to previous session, patient with improvements in functional mobility (including tolerance) for transfers  Patient requiring ocassional safety reminders   Patient continues to be functioning below baseline level, occupational performance remains limited secondary to factors listed above and increased risk for falls and injury  From OT standpoint, recommendation at time of d/c would be Home OT to maximize accomplishment and safety in home environment  Patient to benefit from continued Occupational Therapy treatment while in the hospital to address deficits as defined above and maximize level of functional independence with ADLs and functional mobility        OT Discharge Recommendation: Home with skilled therapy  OT - OK to Discharge: Yes(when medically stable)     Shade Kehr, COTA/L

## 2021-01-15 NOTE — PLAN OF CARE
Problem: PAIN - ADULT  Goal: Verbalizes/displays adequate comfort level or baseline comfort level  Description: Interventions:  - Encourage patient to monitor pain and request assistance  - Assess pain using appropriate pain scale  - Administer analgesics based on type and severity of pain and evaluate response  - Implement non-pharmacological measures as appropriate and evaluate response  - Consider cultural and social influences on pain and pain management  - Notify physician/advanced practitioner if interventions unsuccessful or patient reports new pain  Outcome: Progressing     Problem: INFECTION - ADULT  Goal: Absence or prevention of progression during hospitalization  Description: INTERVENTIONS:  - Assess and monitor for signs and symptoms of infection  - Monitor lab/diagnostic results  - Monitor all insertion sites, i e  indwelling lines, tubes, and drains  - Monitor endotracheal if appropriate and nasal secretions for changes in amount and color  - [place] appropriate cooling/warming therapies per order  - Administer medications as ordered  - Instruct and encourage patient and family to use good hand hygiene technique  - Identify and instruct in appropriate isolation precautions for identified infection/condition  Outcome: Progressing  Goal: Absence of fever/infection during neutropenic period  Description: INTERVENTIONS:  - Monitor WBC    Outcome: Progressing     Problem: SAFETY ADULT  Goal: Patient will remain free of falls  Description: INTERVENTIONS:  - Assess patient frequently for physical needs  -  Identify cognitive and physical deficits and behaviors that affect risk of falls    -  [place] fall precautions as indicated by assessment   - Educate patient/family on patient safety including physical limitations  - Instruct patient to call for assistance with activity based on assessment  - Modify environment to reduce risk of injury  - Consider OT/PT consult to assist with strengthening/mobility  Outcome: Progressing  Goal: Maintain or return to baseline ADL function  Description: INTERVENTIONS:  -  Assess patient's ability to carry out ADLs; assess patient's baseline for ADL function and identify physical deficits which impact ability to perform ADLs (bathing, care of mouth/teeth, toileting, grooming, dressing, etc )  - Assess/evaluate cause of self-care deficits   - Assess range of motion  - Assess patient's mobility; develop plan if impaired  - Assess patient's need for assistive devices and provide as appropriate  - Encourage maximum independence but intervene and supervise when necessary  - Involve family in performance of ADLs  - Assess for home care needs following discharge   - Consider OT consult to assist with ADL evaluation and planning for discharge  - Provide patient education as appropriate  Outcome: Progressing  Goal: Maintain or return mobility status to optimal level  Description: INTERVENTIONS:  - Assess patient's baseline mobility status (ambulation, transfers, stairs, etc )    - Identify cognitive and physical deficits and behaviors that affect mobility  - Identify mobility aids required to assist with transfers and/or ambulation (gait belt, sit-to-stand, lift, walker, cane, etc )  - [place] fall precautions as indicated by assessment  - Record patient progress and toleration of activity level on Mobility SBAR; progress patient to next Phase/Stage  - Instruct patient to call for assistance with activity based on assessment  - Consider rehabilitation consult to assist with strengthening/weightbearing, etc   Outcome: Progressing     Problem: DISCHARGE PLANNING  Goal: Discharge to home or other facility with appropriate resources  Description: INTERVENTIONS:  - Identify barriers to discharge w/patient and caregiver  - Arrange for needed discharge resources and transportation as appropriate  - Identify discharge learning needs (meds, wound care, etc )  - - Refer to Case Management Department for coordinating discharge planning if the patient needs post-hospital services based on physician/advanced practitioner order or complex needs related to functional status, cognitive ability, or social support system  Outcome: Progressing     Problem: Knowledge Deficit  Goal: Patient/family/caregiver demonstrates understanding of disease process, treatment plan, medications, and discharge instructions  Description: Complete learning assessment and assess knowledge base    Interventions:  - Provide teaching at level of understanding  - Provide teaching via preferred learning methods  Outcome: Progressing     Problem: CARDIOVASCULAR - ADULT  Goal: Maintains optimal cardiac output and hemodynamic stability  Description: INTERVENTIONS:  - Monitor I/O, vital signs and rhythm  - Monitor for S/S and trends of decreased cardiac output  - Administer and titrate ordered vasoactive medications to optimize hemodynamic stability  - Assess quality of pulses, skin color and temperature  - Assess for signs of decreased coronary artery perfusion  - Instruct patient to report change in severity of symptoms  Outcome: Progressing     Problem: RESPIRATORY - ADULT  Goal: Achieves optimal ventilation and oxygenation  Description: INTERVENTIONS:  - Assess for changes in respiratory status  - Assess for changes in mentation and behavior  - Position to facilitate oxygenation and minimize respiratory effort  - Oxygen administered by appropriate delivery if ordered  - Initiate smoking cessation education as indicated  - Encourage broncho-pulmonary hygiene including cough, deep breathe, Incentive Spirometry  - Assess the need for suctioning and aspirate as needed  - Assess and instruct to report SOB or any respiratory difficulty  - Respiratory Therapy support as indicated  Outcome: Progressing     Problem: SKIN/TISSUE INTEGRITY - ADULT  Goal: Incision(s), wounds(s) or drain site(s) healing without S/S of infection  Description: INTERVENTIONS  - Assess and document risk factors for skin impairment   - Assess and document dressing, incision, wound bed, drain sites and surrounding tissue  - Consider nutrition services referral as needed  - Oral mucous membranes remain intact  - Provide patient/ family education  Outcome: Progressing     Problem: MUSCULOSKELETAL - ADULT  Goal: Maintain proper alignment of affected body part  Description: INTERVENTIONS:  - Support, maintain and protect limb and body alignment  - Provide patient/ family with appropriate education  Outcome: Progressing     Problem: Potential for Falls  Goal: Patient will remain free of falls  Description: INTERVENTIONS:  - Assess patient frequently for physical needs  -  Identify cognitive and physical deficits and behaviors that affect risk of falls    -  [place] fall precautions as indicated by assessment   - Educate patient/family on patient safety including physical limitations  - Instruct patient to call for assistance with activity based on assessment  - Modify environment to reduce risk of injury  - Consider OT/PT consult to assist with strengthening/mobility  Outcome: Progressing     Problem: Prexisting or High Potential for Compromised Skin Integrity  Goal: Skin integrity is maintained or improved  Description: INTERVENTIONS:  - Identify patients at risk for skin breakdown  - Assess and monitor skin integrity  - Assess and monitor nutrition and hydration status  - Monitor labs   - Assess for incontinence   - Turn and reposition patient  - Assist with mobility/ambulation  - Relieve pressure over bony prominences  - Avoid friction and shearing  - Provide appropriate hygiene as needed including keeping skin clean and dry  - Evaluate need for skin moisturizer/barrier cream  - Collaborate with interdisciplinary team   - Patient/family teaching  - Consider wound care consult   Outcome: Progressing

## 2021-01-15 NOTE — CASE MANAGEMENT
Pt chose [facility], referral made and they are unable ot accept  Pt then chose [facility] and referral was sent

## 2021-01-16 ENCOUNTER — APPOINTMENT (INPATIENT)
Dept: RADIOLOGY | Facility: HOSPITAL | Age: 66
DRG: 470 | End: 2021-01-16
Payer: MEDICARE

## 2021-01-16 LAB
ANION GAP SERPL CALCULATED.3IONS-SCNC: 11 MMOL/L (ref 4–13)
BUN SERPL-MCNC: 21 MG/DL (ref 7–25)
CALCIUM SERPL-MCNC: 9.6 MG/DL (ref 8.6–10.5)
CHLORIDE SERPL-SCNC: 101 MMOL/L (ref 98–107)
CO2 SERPL-SCNC: 22 MMOL/L (ref 21–31)
CREAT SERPL-MCNC: 1.31 MG/DL (ref 0.7–1.3)
GFR SERPL CREATININE-BSD FRML MDRD: 56 ML/MIN/1.73SQ M
GLUCOSE SERPL-MCNC: 107 MG/DL (ref 65–99)
HCT VFR BLD AUTO: 28.5 % (ref 42–47)
HGB BLD-MCNC: 9.2 G/DL (ref 14–18)
POTASSIUM SERPL-SCNC: 3.8 MMOL/L (ref 3.5–5.5)
SODIUM SERPL-SCNC: 134 MMOL/L (ref 134–143)

## 2021-01-16 PROCEDURE — 99024 POSTOP FOLLOW-UP VISIT: CPT | Performed by: PHYSICIAN ASSISTANT

## 2021-01-16 PROCEDURE — 97530 THERAPEUTIC ACTIVITIES: CPT

## 2021-01-16 PROCEDURE — 85014 HEMATOCRIT: CPT | Performed by: PHYSICIAN ASSISTANT

## 2021-01-16 PROCEDURE — 85018 HEMOGLOBIN: CPT | Performed by: PHYSICIAN ASSISTANT

## 2021-01-16 PROCEDURE — 97116 GAIT TRAINING THERAPY: CPT

## 2021-01-16 PROCEDURE — 80048 BASIC METABOLIC PNL TOTAL CA: CPT | Performed by: PHYSICIAN ASSISTANT

## 2021-01-16 PROCEDURE — 99232 SBSQ HOSP IP/OBS MODERATE 35: CPT | Performed by: PHYSICIAN ASSISTANT

## 2021-01-16 PROCEDURE — 73630 X-RAY EXAM OF FOOT: CPT

## 2021-01-16 RX ADMIN — DOCUSATE SODIUM 100 MG: 100 CAPSULE, LIQUID FILLED ORAL at 18:29

## 2021-01-16 RX ADMIN — FONDAPARINUX SODIUM 2.5 MG: 2.5 INJECTION, SOLUTION SUBCUTANEOUS at 08:11

## 2021-01-16 RX ADMIN — OXYCODONE HYDROCHLORIDE AND ACETAMINOPHEN 500 MG: 500 TABLET ORAL at 08:10

## 2021-01-16 RX ADMIN — FOLIC ACID 1 MG: 1 TABLET ORAL at 08:10

## 2021-01-16 RX ADMIN — DOCUSATE SODIUM 100 MG: 100 CAPSULE, LIQUID FILLED ORAL at 08:10

## 2021-01-16 RX ADMIN — MORPHINE SULFATE 3 MG: 4 INJECTION INTRAVENOUS at 14:44

## 2021-01-16 RX ADMIN — OXYCODONE HYDROCHLORIDE AND ACETAMINOPHEN 1 TABLET: 5; 325 TABLET ORAL at 08:11

## 2021-01-16 RX ADMIN — OXYCODONE HYDROCHLORIDE AND ACETAMINOPHEN 2 TABLET: 5; 325 TABLET ORAL at 20:40

## 2021-01-16 RX ADMIN — FERROUS SULFATE TAB 325 MG (65 MG ELEMENTAL FE) 325 MG: 325 (65 FE) TAB at 16:25

## 2021-01-16 RX ADMIN — FERROUS SULFATE TAB 325 MG (65 MG ELEMENTAL FE) 325 MG: 325 (65 FE) TAB at 08:10

## 2021-01-16 RX ADMIN — Medication 1 TABLET: at 08:10

## 2021-01-16 RX ADMIN — OXYCODONE HYDROCHLORIDE AND ACETAMINOPHEN 2 TABLET: 5; 325 TABLET ORAL at 02:39

## 2021-01-16 RX ADMIN — OXYCODONE HYDROCHLORIDE AND ACETAMINOPHEN 500 MG: 500 TABLET ORAL at 18:29

## 2021-01-16 NOTE — NURSING NOTE
Patient pleasant, resting in bed  Ace wrap to LLE, positive pedal pulse, full sensation, +1 pitting edema present  Complains of pain 4/10 on numeric pain scale  Percocet PO administered  PT to be in to work with pt  Call bell and belongings within reach, will continue to monitor

## 2021-01-16 NOTE — PLAN OF CARE
Problem: PHYSICAL THERAPY ADULT  Goal: Performs mobility at highest level of function for planned discharge setting  See evaluation for individualized goals  Description: Treatment/Interventions: Functional transfer training, LE strengthening/ROM, Elevations, Therapeutic exercise, Endurance training, Patient/family training, Equipment eval/education, Bed mobility, Gait training, Spoke to nursing, Spoke to case management  Equipment Recommended: (continue RW use)       See flowsheet documentation for full assessment, interventions and recommendations  Outcome: Progressing  Note: Prognosis: Fair  Problem List: Decreased strength, Decreased range of motion, Impaired balance, Decreased mobility, Decreased safety awareness, Orthopedic restrictions, Pain  Assessment: Pt seen for PT treatment session this date with interventions consisting of gait training w/ emphasis on improving pt's ability to ambulate level surfaces x 2 steps with CGA provided by therapist with RW and therapeutic activity consisting of training: supine<>sit transfers and sit<>stand transfers  Pt agreeable to PT treatment session upon arrival, pt found supine in bed w/ HOB elevated, in no apparent distress  In comparison to previous session, pt with no improvements as evidenced by decreased ambulation distance  Post session: pt returned BTB, chair alarm engaged and all needs in reach Continue to recommend STR at time of d/c in order to maximize pt's functional independence and safety w/ mobility  Pt continues to be functioning below baseline level, and remains limited 2* factors listed above and including pain and weakness  PT will continue to see pt while here in order to address the deficits listed above and provide interventions consistent w/ POC in effort to achieve goals    Barriers to Discharge: Inaccessible home environment, Decreased caregiver support     PT Discharge Recommendation: Post-Acute Rehabilitation Services     PT - OK to Discharge: No    See flowsheet documentation for full assessment   Ashley Mera, PT

## 2021-01-16 NOTE — PROGRESS NOTES
Orthopedics   [facility] 77 y o  male MRN: [id number]  Unit/Bed#: -01      Subjective:  77 y o male post operative day 3 left total knee arthroplasty  Pt doing well  Pain controlled about the left knee  Patient was to go home today but when was up with physical therapy noted pain across the top of his right foot  This is reported as an old injury that is bothersome for many years as his foot was crushed  He denies recent trauma  He denies redness or warmth about his foot      Labs:  0   Lab Value Date/Time    HCT 28 5 (L) 01/16/2021 0438    HCT 29 8 (L) 01/15/2021 0434    HCT 32 0 (L) 01/14/2021 0607    HGB 9 2 (L) 01/16/2021 0438    HGB 9 7 (L) 01/15/2021 0434    HGB 10 6 (L) 01/14/2021 0607    INR 1 02 12/29/2020 0833    WBC 7 90 12/29/2020 0833    WBC 8 20 02/06/2019 1000    CRP 15 9 (H) 12/29/2020 0833     Meds:    Current Facility-Administered Medications:     acetaminophen (TYLENOL) tablet 650 mg, 650 mg, Oral, Q4H PRN, Javier Holley PA-C, 650 mg at 01/15/21 0252    aluminum-magnesium hydroxide-simethicone (MYLANTA) oral suspension 30 mL, 30 mL, Oral, Q6H PRN, Javier Holley PA-C    ascorbic acid (VITAMIN C) tablet 500 mg, 500 mg, Oral, BID, Stacy Layton PA-C, 500 mg at 01/16/21 0810    docusate sodium (COLACE) capsule 100 mg, 100 mg, Oral, BID, Stacy Layton PA-C, 100 mg at 01/16/21 5583    ferrous sulfate tablet 325 mg, 325 mg, Oral, BID With Meals, Javier Holley PA-C, 325 mg at 19/48/94 2948    folic acid (FOLVITE) tablet 1 mg, 1 mg, Oral, Daily, Javier Holley PA-C, 1 mg at 01/16/21 0810    fondaparinux (ARIXTRA) subcutaneous injection 2 5 mg, 2 5 mg, Subcutaneous, Daily, Stacy Layton PA-C, 2 5 mg at 01/16/21 0811    morphine (PF) 4 mg/mL injection 3 mg, 3 mg, Intravenous, Q4H PRN, Javier Holley PA-C, 3 mg at 01/15/21 0651    multivitamin-minerals (CENTRUM) tablet 1 tablet, 1 tablet, Oral, Daily, Javier Holley PA-C, 1 tablet at 01/16/21 0810    nicotine (NICODERM CQ) 7 mg/24hr TD 24 hr patch 7 mg, 7 mg, Transdermal, Daily, TAHIRA Yost    ondansetron (ZOFRAN) injection 4 mg, 4 mg, Intravenous, Q6H PRN, Maddie Laughter, PA-C    oxyCODONE-acetaminophen (PERCOCET) 5-325 mg per tablet 1 tablet, 1 tablet, Oral, Q4H PRN, Maddie Laughter, PA-C, 1 tablet at 01/16/21 6657    oxyCODONE-acetaminophen (PERCOCET) 5-325 mg per tablet 2 tablet, 2 tablet, Oral, Q6H PRN, Maddie Laughter, PA-C, 2 tablet at 01/16/21 0239    Blood Culture:   No results found for: BLOODCX    Wound Culture:   No results found for: WOUNDCULT    Ins and Outs:  I/O last 24 hours: In: 240 [P O :240]  Out: 590 [Urine:590]    Physical:  Vitals:    01/16/21 0700   BP: 107/63   Pulse: 93   Resp: 18   Temp: 97 9 °F (36 6 °C)   SpO2: 98%     left lower extremity:  · Dressings C/D/I, wound healing well with staples  No signs infection  · Toes warm and well perfused  · Negative [place] sign no palpable cords, thigh soft    Right foot:  No erythema or warmth no intra-articular effusion, no gross inflammation nor signs of gout  Dorsalis pedis and posterior tibial pulses 4/4  No long bone deformity no ecchymosis  Adequate plantar dorsiflexion but decreased slightly due to discomfort  Positive discomfort with attempted inversion and eversion of the ankle  Assessment: 77 y o male post operative day 3 left total knee arthroplasty  Doing well pain right foot from old injury with no trauma or signs gout inflammation  Plan: We will get x-ray of the right foot, ice to the right foot  Would like to give anti-inflammatory but due to GFR patient was unable receive anti-inflammatories  · Weight Bearing as tolerated with walker with assistance  · Re-evaluate for possible discharge tomorrow or more likely Monday as PT not available to work with stairs tomorrow  As discharge cannot be done today as patient cannot ambulate    · Up and out of bed  · DVT prophylaxis with Arixtra as scheduled  · Analgesics  · PT/OT  · Stable blood loss anemia with hemoglobin 9 2 today    ·   Brian Fontenot PA-C

## 2021-01-16 NOTE — PLAN OF CARE
Problem: PAIN - ADULT  Goal: Verbalizes/displays adequate comfort level or baseline comfort level  Description: Interventions:  - Encourage patient to monitor pain and request assistance  - Assess pain using appropriate pain scale  - Administer analgesics based on type and severity of pain and evaluate response  - Implement non-pharmacological measures as appropriate and evaluate response  - Consider cultural and social influences on pain and pain management  - Notify physician/advanced practitioner if interventions unsuccessful or patient reports new pain  Outcome: Progressing     Problem: INFECTION - ADULT  Goal: Absence or prevention of progression during hospitalization  Description: INTERVENTIONS:  - Assess and monitor for signs and symptoms of infection  - Monitor lab/diagnostic results  - Monitor all insertion sites, i e  indwelling lines, tubes, and drains  - Monitor endotracheal if appropriate and nasal secretions for changes in amount and color  - [place] appropriate cooling/warming therapies per order  - Administer medications as ordered  - Instruct and encourage patient and family to use good hand hygiene technique  - Identify and instruct in appropriate isolation precautions for identified infection/condition  Outcome: Progressing  Goal: Absence of fever/infection during neutropenic period  Description: INTERVENTIONS:  - Monitor WBC    Outcome: Progressing     Problem: SAFETY ADULT  Goal: Patient will remain free of falls  Description: INTERVENTIONS:  - Assess patient frequently for physical needs  -  Identify cognitive and physical deficits and behaviors that affect risk of falls    -  [place] fall precautions as indicated by assessment   - Educate patient/family on patient safety including physical limitations  - Instruct patient to call for assistance with activity based on assessment  - Modify environment to reduce risk of injury  - Consider OT/PT consult to assist with strengthening/mobility  Outcome: Progressing  Goal: Maintain or return to baseline ADL function  Description: INTERVENTIONS:  -  Assess patient's ability to carry out ADLs; assess patient's baseline for ADL function and identify physical deficits which impact ability to perform ADLs (bathing, care of mouth/teeth, toileting, grooming, dressing, etc )  - Assess/evaluate cause of self-care deficits   - Assess range of motion  - Assess patient's mobility; develop plan if impaired  - Assess patient's need for assistive devices and provide as appropriate  - Encourage maximum independence but intervene and supervise when necessary  - Involve family in performance of ADLs  - Assess for home care needs following discharge   - Consider OT consult to assist with ADL evaluation and planning for discharge  - Provide patient education as appropriate  Outcome: Progressing  Goal: Maintain or return mobility status to optimal level  Description: INTERVENTIONS:  - Assess patient's baseline mobility status (ambulation, transfers, stairs, etc )    - Identify cognitive and physical deficits and behaviors that affect mobility  - Identify mobility aids required to assist with transfers and/or ambulation (gait belt, sit-to-stand, lift, walker, cane, etc )  - [place] fall precautions as indicated by assessment  - Record patient progress and toleration of activity level on Mobility SBAR; progress patient to next Phase/Stage  - Instruct patient to call for assistance with activity based on assessment  - Consider rehabilitation consult to assist with strengthening/weightbearing, etc   Outcome: Progressing     Problem: DISCHARGE PLANNING  Goal: Discharge to home or other facility with appropriate resources  Description: INTERVENTIONS:  - Identify barriers to discharge w/patient and caregiver  - Arrange for needed discharge resources and transportation as appropriate  - Identify discharge learning needs (meds, wound care, etc )  - - Refer to Case Management Department for coordinating discharge planning if the patient needs post-hospital services based on physician/advanced practitioner order or complex needs related to functional status, cognitive ability, or social support system  Outcome: Progressing     Problem: Knowledge Deficit  Goal: Patient/family/caregiver demonstrates understanding of disease process, treatment plan, medications, and discharge instructions  Description: Complete learning assessment and assess knowledge base    Interventions:  - Provide teaching at level of understanding  - Provide teaching via preferred learning methods  Outcome: Progressing     Problem: CARDIOVASCULAR - ADULT  Goal: Maintains optimal cardiac output and hemodynamic stability  Description: INTERVENTIONS:  - Monitor I/O, vital signs and rhythm  - Monitor for S/S and trends of decreased cardiac output  - Administer and titrate ordered vasoactive medications to optimize hemodynamic stability  - Assess quality of pulses, skin color and temperature  - Assess for signs of decreased coronary artery perfusion  - Instruct patient to report change in severity of symptoms  Outcome: Progressing     Problem: RESPIRATORY - ADULT  Goal: Achieves optimal ventilation and oxygenation  Description: INTERVENTIONS:  - Assess for changes in respiratory status  - Assess for changes in mentation and behavior  - Position to facilitate oxygenation and minimize respiratory effort  - Oxygen administered by appropriate delivery if ordered  - Initiate smoking cessation education as indicated  - Encourage broncho-pulmonary hygiene including cough, deep breathe, Incentive Spirometry  - Assess the need for suctioning and aspirate as needed  - Assess and instruct to report SOB or any respiratory difficulty  - Respiratory Therapy support as indicated  Outcome: Progressing     Problem: SKIN/TISSUE INTEGRITY - ADULT  Goal: Incision(s), wounds(s) or drain site(s) healing without S/S of infection  Description: INTERVENTIONS  - Assess and document risk factors for skin impairment   - Assess and document dressing, incision, wound bed, drain sites and surrounding tissue  - Consider nutrition services referral as needed  - Oral mucous membranes remain intact  - Provide patient/ family education  Outcome: Progressing     Problem: MUSCULOSKELETAL - ADULT  Goal: Maintain proper alignment of affected body part  Description: INTERVENTIONS:  - Support, maintain and protect limb and body alignment  - Provide patient/ family with appropriate education  Outcome: Progressing     Problem: Potential for Falls  Goal: Patient will remain free of falls  Description: INTERVENTIONS:  - Assess patient frequently for physical needs  -  Identify cognitive and physical deficits and behaviors that affect risk of falls    -  [place] fall precautions as indicated by assessment   - Educate patient/family on patient safety including physical limitations  - Instruct patient to call for assistance with activity based on assessment  - Modify environment to reduce risk of injury  - Consider OT/PT consult to assist with strengthening/mobility  Outcome: Progressing     Problem: Prexisting or High Potential for Compromised Skin Integrity  Goal: Skin integrity is maintained or improved  Description: INTERVENTIONS:  - Identify patients at risk for skin breakdown  - Assess and monitor skin integrity  - Assess and monitor nutrition and hydration status  - Monitor labs   - Assess for incontinence   - Turn and reposition patient  - Assist with mobility/ambulation  - Relieve pressure over bony prominences  - Avoid friction and shearing  - Provide appropriate hygiene as needed including keeping skin clean and dry  - Evaluate need for skin moisturizer/barrier cream  - Collaborate with interdisciplinary team   - Patient/family teaching  - Consider wound care consult   Outcome: Progressing

## 2021-01-16 NOTE — PLAN OF CARE
Problem: OCCUPATIONAL THERAPY ADULT  Goal: Performs self-care activities at highest level of function for planned discharge setting  See evaluation for individualized goals  Description: Treatment Interventions: ADL retraining, Functional transfer training, Endurance training, Equipment evaluation/education, Neuromuscular reeducation, Energy conservation     See flowsheet documentation for full assessment, interventions and recommendations  Outcome: Not Progressing  Note: Limitation: Decreased ADL status, Decreased Safe judgement during ADL, Decreased endurance, Decreased self-care trans, Decreased high-level ADLs  Prognosis: Fair  Assessment: Patient participated in Skilled OT session this date with interventions consisting of  therapeutic activities to: increase activity tolerance   Patient agreeable to OT treatment session, upon arrival patient was found supine in bed  Patient requiring verbal cues for correct technique and frequent rest periods  Patient continues to be functioning below baseline level, occupational performance remains limited secondary to factors listed above and increased risk for falls and injury  From OT standpoint, recommendation at time of d/c would be Short Term Rehab  Patient to benefit from continued Occupational Therapy treatment while in the hospital to address deficits as defined above and maximize level of functional independence with ADLs and functional mobility       OT Discharge Recommendation: Post-Acute Rehabilitation Services  OT - OK to Discharge: Yes(when medically stable)    Radha Lomax MS, OTR/L

## 2021-01-16 NOTE — PLAN OF CARE
Problem: PAIN - ADULT  Goal: Verbalizes/displays adequate comfort level or baseline comfort level  Description: Interventions:  - Encourage patient to monitor pain and request assistance  - Assess pain using appropriate pain scale  - Administer analgesics based on type and severity of pain and evaluate response  - Implement non-pharmacological measures as appropriate and evaluate response  - Consider cultural and social influences on pain and pain management  - Notify physician/advanced practitioner if interventions unsuccessful or patient reports new pain  Outcome: Progressing     Problem: INFECTION - ADULT  Goal: Absence or prevention of progression during hospitalization  Description: INTERVENTIONS:  - Assess and monitor for signs and symptoms of infection  - Monitor lab/diagnostic results  - Monitor all insertion sites, i e  indwelling lines, tubes, and drains  - Monitor endotracheal if appropriate and nasal secretions for changes in amount and color  - [place] appropriate cooling/warming therapies per order  - Administer medications as ordered  - Instruct and encourage patient and family to use good hand hygiene technique  - Identify and instruct in appropriate isolation precautions for identified infection/condition  Outcome: Progressing  Goal: Absence of fever/infection during neutropenic period  Description: INTERVENTIONS:  - Monitor WBC    Outcome: Progressing     Problem: SAFETY ADULT  Goal: Patient will remain free of falls  Description: INTERVENTIONS:  - Assess patient frequently for physical needs  -  Identify cognitive and physical deficits and behaviors that affect risk of falls    -  [place] fall precautions as indicated by assessment   - Educate patient/family on patient safety including physical limitations  - Instruct patient to call for assistance with activity based on assessment  - Modify environment to reduce risk of injury  - Consider OT/PT consult to assist with strengthening/mobility  Outcome: Progressing  Goal: Maintain or return to baseline ADL function  Description: INTERVENTIONS:  -  Assess patient's ability to carry out ADLs; assess patient's baseline for ADL function and identify physical deficits which impact ability to perform ADLs (bathing, care of mouth/teeth, toileting, grooming, dressing, etc )  - Assess/evaluate cause of self-care deficits   - Assess range of motion  - Assess patient's mobility; develop plan if impaired  - Assess patient's need for assistive devices and provide as appropriate  - Encourage maximum independence but intervene and supervise when necessary  - Involve family in performance of ADLs  - Assess for home care needs following discharge   - Consider OT consult to assist with ADL evaluation and planning for discharge  - Provide patient education as appropriate  Outcome: Progressing  Goal: Maintain or return mobility status to optimal level  Description: INTERVENTIONS:  - Assess patient's baseline mobility status (ambulation, transfers, stairs, etc )    - Identify cognitive and physical deficits and behaviors that affect mobility  - Identify mobility aids required to assist with transfers and/or ambulation (gait belt, sit-to-stand, lift, walker, cane, etc )  - [place] fall precautions as indicated by assessment  - Record patient progress and toleration of activity level on Mobility SBAR; progress patient to next Phase/Stage  - Instruct patient to call for assistance with activity based on assessment  - Consider rehabilitation consult to assist with strengthening/weightbearing, etc   Outcome: Progressing     Problem: DISCHARGE PLANNING  Goal: Discharge to home or other facility with appropriate resources  Description: INTERVENTIONS:  - Identify barriers to discharge w/patient and caregiver  - Arrange for needed discharge resources and transportation as appropriate  - Identify discharge learning needs (meds, wound care, etc )  - - Refer to Case Management Department for coordinating discharge planning if the patient needs post-hospital services based on physician/advanced practitioner order or complex needs related to functional status, cognitive ability, or social support system  Outcome: Progressing     Problem: Knowledge Deficit  Goal: Patient/family/caregiver demonstrates understanding of disease process, treatment plan, medications, and discharge instructions  Description: Complete learning assessment and assess knowledge base    Interventions:  - Provide teaching at level of understanding  - Provide teaching via preferred learning methods  Outcome: Progressing     Problem: RESPIRATORY - ADULT  Goal: Achieves optimal ventilation and oxygenation  Description: INTERVENTIONS:  - Assess for changes in respiratory status  - Assess for changes in mentation and behavior  - Position to facilitate oxygenation and minimize respiratory effort  - Oxygen administered by appropriate delivery if ordered  - Initiate smoking cessation education as indicated  - Encourage broncho-pulmonary hygiene including cough, deep breathe, Incentive Spirometry  - Assess the need for suctioning and aspirate as needed  - Assess and instruct to report SOB or any respiratory difficulty  - Respiratory Therapy support as indicated  Outcome: Progressing     Problem: MUSCULOSKELETAL - ADULT  Goal: Maintain proper alignment of affected body part  Description: INTERVENTIONS:  - Support, maintain and protect limb and body alignment  - Provide patient/ family with appropriate education  Outcome: Progressing     Problem: SKIN/TISSUE INTEGRITY - ADULT  Goal: Incision(s), wounds(s) or drain site(s) healing without S/S of infection  Description: INTERVENTIONS  - Assess and document risk factors for skin impairment   - Assess and document dressing, incision, wound bed, drain sites and surrounding tissue  - Consider nutrition services referral as needed  - Oral mucous membranes remain intact  - Provide patient/ family education  Outcome: Progressing     Problem: Potential for Falls  Goal: Patient will remain free of falls  Description: INTERVENTIONS:  - Assess patient frequently for physical needs  -  Identify cognitive and physical deficits and behaviors that affect risk of falls    -  [place] fall precautions as indicated by assessment   - Educate patient/family on patient safety including physical limitations  - Instruct patient to call for assistance with activity based on assessment  - Modify environment to reduce risk of injury  - Consider OT/PT consult to assist with strengthening/mobility  Outcome: Progressing     Problem: Prexisting or High Potential for Compromised Skin Integrity  Goal: Skin integrity is maintained or improved  Description: INTERVENTIONS:  - Identify patients at risk for skin breakdown  - Assess and monitor skin integrity  - Assess and monitor nutrition and hydration status  - Monitor labs   - Assess for incontinence   - Turn and reposition patient  - Assist with mobility/ambulation  - Relieve pressure over bony prominences  - Avoid friction and shearing  - Provide appropriate hygiene as needed including keeping skin clean and dry  - Evaluate need for skin moisturizer/barrier cream  - Collaborate with interdisciplinary team   - Patient/family teaching  - Consider wound care consult   Outcome: Progressing

## 2021-01-16 NOTE — OCCUPATIONAL THERAPY NOTE
Occupational Therapy Treatment Note      [facility]    1/16/2021    Principal Problem:    Primary osteoarthritis of left knee  Active Problems:    Tobacco abuse      Past Medical History:   Diagnosis Date    Arthritis     Rotator cuff injury     left shoulder       Past Surgical History:   Procedure Laterality Date    JOINT REPLACEMENT Right 2018    MULTIPLE TOOTH EXTRACTIONS      in office under local    GA TOTAL KNEE ARTHROPLASTY Right 2/26/2019    Procedure: ARTHROPLASTY KNEE TOTAL;  Surgeon: Danielle Lara DO;  Location: [address] MAIN OR;  Service: Orthopedics    GA TOTAL KNEE ARTHROPLASTY Left 1/13/2021    Procedure: KNEE TOTAL ARTHROPLASTY;  Surgeon: Danielle Lara DO;  Location: [address] [facility] MAIN OR;  Service: Orthopedics    TONSILLECTOMY          01/16/21 0851   OT Last Visit   OT Visit Date 01/16/21   Note Type   Note Type Treatment   Restrictions/Precautions   Weight Bearing Precautions Per Order Yes   LLE Weight Bearing Per Order WBAT   Other Precautions Bed Alarm; Fall Risk;Pain   Pain Assessment   Pain Assessment Tool 0-10   Pain Score 5  (7/10 with movement)   Pain Location/Orientation Orientation: Left; Location: Knee   Pain Onset/Description Onset: Ongoing   Effect of Pain on Daily Activities yes   Patient's Stated Pain Goal No pain   Hospital Pain Intervention(s) Medication (See MAR); Ambulation/increased activity;Repositioned   Bed Mobility   Supine to Sit 5  Supervision   Additional items Assist x 1;HOB elevated; Bedrails; Increased time required;Verbal cues   Sit to Supine 5  Supervision   Additional items HOB elevated; Bedrails; Increased time required;Verbal cues   Additional Comments extremly slow movement   Transfers   Sit to Stand   (CGA)   Additional items Assist x 1;Bedrails; Increased time required;Verbal cues   Stand to Sit   (CGA)   Additional items Assist x 1;Bedrails; Increased time required;Verbal cues   Additional Comments Pt resistive to education on proper body mechanics and techniques   Cognition Overall Cognitive Status WFL   Arousal/Participation Alert   Attention Within functional limits   Orientation Level Oriented X4   Memory Within functional limits   Following Commands Follows one step commands without difficulty   Activity Tolerance   Activity Tolerance Patient limited by pain   Medical Staff Made Aware ALETHEA Swift   Assessment   Assessment Patient participated in Skilled OT session this date with interventions consisting of  therapeutic activities to: increase activity tolerance   Patient agreeable to OT treatment session, upon arrival patient was found supine in bed  Patient requiring verbal cues for correct technique and frequent rest periods  Patient continues to be functioning below baseline level, occupational performance remains limited secondary to factors listed above and increased risk for falls and injury  From OT standpoint, recommendation at time of d/c would be Short Term Rehab  Patient to benefit from continued Occupational Therapy treatment while in the hospital to address deficits as defined above and maximize level of functional independence with ADLs and functional mobility  Plan   Treatment Interventions ADL retraining;Functional transfer training; Endurance training;Equipment evaluation/education; Neuromuscular reeducation; Energy conservation   Goal Expiration Date 01/23/21   OT Treatment Day 3   OT Frequency 3-5x/wk   Recommendation   OT Discharge Recommendation Post-Acute Rehabilitation Services     Radha Arroyo MS, OTR/L

## 2021-01-16 NOTE — PHYSICAL THERAPY NOTE
PT Treatment Note       01/16/21 0850   PT Last Visit   PT Visit Date 01/16/21   Note Type   Note Type Treatment   Pain Assessment   Pain Assessment Tool Pain Assessment not indicated - pt denies pain   Pain Score 5   Pain Location/Orientation Orientation: Left; Location: Knee   Hospital Pain Intervention(s) Ambulation/increased activity   Restrictions/Precautions   Weight Bearing Precautions Per Order Yes   LLE Weight Bearing Per Order WBAT   Other Precautions Pain; Fall Risk;Bed Alarm   General   Chart Reviewed Yes   Response to Previous Treatment Patient with no complaints from previous session  Family/Caregiver Present No   Cognition   Overall Cognitive Status WFL   Arousal/Participation Alert   Attention Within functional limits   Orientation Level Oriented X4   Memory Within functional limits   Following Commands Follows all commands and directions without difficulty   Comments pt agreeable to PT session   Subjective   Subjective pt reporting he is unable to bear weight through his R ankle due to the SCD being on for 4 day( ROM WFL and no skin integrity issues noted)   Bed Mobility   Supine to Sit 5  Supervision   Additional items Bedrails; Increased time required;HOB elevated   Sit to Supine 5  Supervision   Additional items Increased time required; Bedrails   Additional Comments extremly slow movement   Transfers   Sit to Stand   (CGA)   Additional items Increased time required;Verbal cues  (bed raised; multiple attempts)   Stand to Sit   (CGA)   Additional items Bedrails; Increased time required   Ambulation/Elevation   Gait pattern Improper Weight shift; Antalgic;Decreased foot clearance; Excessively slow; Inconsistent mook   Gait Assistance   (CGA)   Additional items Verbal cues  (resistant to assistance or education on proper mechanics)   Assistive Device Rolling walker   Distance 2 ft   Balance   Static Sitting Good   Dynamic Sitting Good   Static Standing Fair   Dynamic Standing Fair -   Ambulatory Fair -   Endurance Deficit   Endurance Deficit Yes   Endurance Deficit Description unable to progress to steps   Activity Tolerance   Activity Tolerance Patient limited by fatigue;Patient limited by pain   Nurse Made Aware ortho and RN Cirilo Figueroa made aware   Assessment   Prognosis Fair   Assessment Pt seen for PT treatment session this date with interventions consisting of gait training w/ emphasis on improving pt's ability to ambulate level surfaces x 2 steps with CGA provided by therapist with RW and therapeutic activity consisting of training: supine<>sit transfers and sit<>stand transfers  Pt agreeable to PT treatment session upon arrival, pt found supine in bed w/ HOB elevated, in no apparent distress  In comparison to previous session, pt with no improvements as evidenced by decreased ambulation distance  Post session: pt returned BTB, chair alarm engaged and all needs in reach Continue to recommend STR at time of d/c in order to maximize pt's functional independence and safety w/ mobility  Pt continues to be functioning below baseline level, and remains limited 2* factors listed above and including pain and weakness  PT will continue to see pt while here in order to address the deficits listed above and provide interventions consistent w/ POC in effort to achieve goals     Barriers to Discharge Inaccessible home environment;Decreased caregiver support   Goals   Patient Goals to have less pain   PT Treatment Day 6   Plan   Progress No functional improvements   Recommendation   PT Discharge Recommendation Post-Acute Rehabilitation Services   PT - OK to Discharge No   Luz [place], PT

## 2021-01-17 PROCEDURE — 97530 THERAPEUTIC ACTIVITIES: CPT

## 2021-01-17 PROCEDURE — 99231 SBSQ HOSP IP/OBS SF/LOW 25: CPT | Performed by: PHYSICIAN ASSISTANT

## 2021-01-17 PROCEDURE — 97110 THERAPEUTIC EXERCISES: CPT

## 2021-01-17 PROCEDURE — 97116 GAIT TRAINING THERAPY: CPT

## 2021-01-17 PROCEDURE — 99024 POSTOP FOLLOW-UP VISIT: CPT | Performed by: PHYSICIAN ASSISTANT

## 2021-01-17 RX ADMIN — OXYCODONE HYDROCHLORIDE AND ACETAMINOPHEN 2 TABLET: 5; 325 TABLET ORAL at 08:18

## 2021-01-17 RX ADMIN — FERROUS SULFATE TAB 325 MG (65 MG ELEMENTAL FE) 325 MG: 325 (65 FE) TAB at 08:14

## 2021-01-17 RX ADMIN — FOLIC ACID 1 MG: 1 TABLET ORAL at 08:14

## 2021-01-17 RX ADMIN — OXYCODONE HYDROCHLORIDE AND ACETAMINOPHEN 500 MG: 500 TABLET ORAL at 17:40

## 2021-01-17 RX ADMIN — FERROUS SULFATE TAB 325 MG (65 MG ELEMENTAL FE) 325 MG: 325 (65 FE) TAB at 17:39

## 2021-01-17 RX ADMIN — DOCUSATE SODIUM 100 MG: 100 CAPSULE, LIQUID FILLED ORAL at 17:39

## 2021-01-17 RX ADMIN — Medication 1 TABLET: at 08:14

## 2021-01-17 RX ADMIN — OXYCODONE HYDROCHLORIDE AND ACETAMINOPHEN 500 MG: 500 TABLET ORAL at 08:14

## 2021-01-17 RX ADMIN — FONDAPARINUX SODIUM 2.5 MG: 2.5 INJECTION, SOLUTION SUBCUTANEOUS at 08:14

## 2021-01-17 RX ADMIN — OXYCODONE HYDROCHLORIDE AND ACETAMINOPHEN 2 TABLET: 5; 325 TABLET ORAL at 17:41

## 2021-01-17 RX ADMIN — DOCUSATE SODIUM 100 MG: 100 CAPSULE, LIQUID FILLED ORAL at 08:14

## 2021-01-17 NOTE — NURSING NOTE
Surgical PA-C in to see patient  Patient OOB with PT/OT  Patient continues to complain of pain in right ankle  PA-C went over results with pt  Continue to apply ice, and surgical shoe to be applied

## 2021-01-17 NOTE — OCCUPATIONAL THERAPY NOTE
OccupationalTherapy Treatment Note     Patient Name: Arnoldo Mcarthur  YNYPA'X Date: 1/17/2021  Problem List  Principal Problem:    Primary osteoarthritis of left knee  Active Problems:    Tobacco abuse       01/17/21 0940   OT Last Visit   OT Visit Date 01/17/21   Note Type   Note Type Treatment   Restrictions/Precautions   Weight Bearing Precautions Per Order Yes   LLE Weight Bearing Per Order WBAT   Braces or Orthoses   (surgical shoe RLE)   Other Precautions Pain; Fall Risk;Bed Alarm   General   Response to Previous Treatment Patient with no complaints from previous session   Pain Assessment   Pain Assessment Tool 0-10   Pain Score 3   Pain Location/Orientation Orientation: Left; Location: Hip   Pain Onset/Description Onset: Ongoing;Frequency: Constant/Continuous; Descriptor: [address] Pain Intervention(s) Medication (See MAR); Ambulation/increased activity;Repositioned   Bed Mobility   Supine to Sit 5  Supervision   Additional items Assist x 1; Increased time required;Verbal cues   Sit to Supine 5  Supervision   Additional items Assist x 1; Increased time required;Verbal cues   Transfers   Sit to Stand   (CGA)   Additional items Increased time required;Verbal cues; Bedrails;Assist x 2  (Pt requires assist x 2 for safety )   Stand to Sit   (CGA)   Additional items Assist x 2; Increased time required;Verbal cues;Armrests   Functional Mobility   Functional Mobility 4  Minimal assistance   Additional Comments Pt ambulated short distance in room with chair follow for safety    Additional items Rolling walker   Cognition   Overall Cognitive Status [facility]   Arousal/Participation Alert; Responsive; Cooperative   Attention Within functional limits   Orientation Level Oriented X4   Memory Within functional limits   Following Commands Follows one step commands without difficulty   Comments Pt agreeable to OT session      Activity Tolerance   Activity Tolerance Patient limited by pain   Medical Staff Made Aware ALETHEA Salter verbalized pt appropriate for therapy and made aware of outcomes    Assessment   Assessment Patient participated in Skilled OT session this date with interventions consisting of  therapeutic activities to: increase activity tolerance, increase dynamic sit/ stand balance during functional activity , increase postural control, increase trunk control and increase OOB/ sitting tolerance   Patient agreeable to OT treatment session, upon arrival patient was found supine in bed and in no apparent distress  In comparison to previous session, patient with improvements in endurance  Patient requiring verbal cues for safety, one step directives and frequent rest periods  Patient continues to be functioning below baseline level, occupational performance remains limited secondary to factors listed above and increased risk for falls and injury  From OT standpoint, recommendation at time of d/c would be Post-Acute Rehabilitation Services  Patient to benefit from continued Occupational Therapy treatment while in the hospital to address deficits as defined above and maximize level of functional independence with ADLs and functional mobility  Plan   Treatment Interventions ADL retraining;Functional transfer training; Endurance training; Compensatory technique education; Activityengagement   Goal Expiration Date 01/23/21   OT Treatment Day 4   OT Frequency 3-5x/wk   Recommendation   OT Discharge Recommendation Post-Acute Rehabilitation Services   OT - OK to Discharge Yes  (Once medically cleared )   Additional Comments  At end of session, pt supine in bed with all needs met             Maye Pineda, OT

## 2021-01-17 NOTE — PLAN OF CARE
Problem: PHYSICAL THERAPY ADULT  Goal: Performs mobility at highest level of function for planned discharge setting  See evaluation for individualized goals  Description: Treatment/Interventions: Functional transfer training, LE strengthening/ROM, Elevations, Therapeutic exercise, Endurance training, Patient/family training, Equipment eval/education, Bed mobility, Gait training, Spoke to nursing, Spoke to case management  Equipment Recommended: (continue RW use)       See flowsheet documentation for full assessment, interventions and recommendations  Outcome: Progressing  Note: Prognosis: Fair  Problem List: Decreased strength, Decreased range of motion, Impaired balance, Decreased mobility, Decreased safety awareness, Orthopedic restrictions, Pain  Assessment: Pt seen for PT treatment session this date with interventions consisting of gait training w/ emphasis on improving pt's ability to ambulate level surfaces x 2ft and 10 ft with CGA provided by therapist with RW, Therapeutic exercise consisting of: AROM and PROM 15 reps L LE in sitting position and therapeutic activity consisting of training: supine<>sit transfers and sit<>stand transfers  Pt agreeable to PT treatment session upon arrival, pt found supine in bed w/ HOB elevated, in no apparent distress  In comparison to previous session, pt with improvements in distance ambulated compared to yesturday  Post session: pt returned BTB, bed alarm engaged and all needs in reach Continue to recommend STR at time of d/c in order to maximize pt's functional independence and safety w/ mobility  Pt continues to be functioning below baseline level, and remains limited 2* factors listed above and including pain and decreased ROM  PT will continue to see pt while here in order to address the deficits listed above and provide interventions consistent w/ POC in effort to achieve goals    Barriers to Discharge: Inaccessible home environment, Decreased caregiver support PT Discharge Recommendation: Post-Acute Rehabilitation Services     PT - OK to Discharge: Yes    See flowsheet documentation for full assessment   Can Anger, PT

## 2021-01-17 NOTE — NURSING NOTE
Patient awake and alert, PT/OT in to see patient  Patient does not want to participate until the results of his xray are back for his right ankle  Patient states, "the SCD's are what caused the pain in my ankle " Patient denies complaints of pain in operative leg  Patient agreeable to dangle at bedside once pain meds set in  Call bell and christopher matamoros, will continue to monitor

## 2021-01-17 NOTE — PLAN OF CARE
Problem: PAIN - ADULT  Goal: Verbalizes/displays adequate comfort level or baseline comfort level  Description: Interventions:  - Encourage patient to monitor pain and request assistance  - Assess pain using appropriate pain scale  - Administer analgesics based on type and severity of pain and evaluate response  - Implement non-pharmacological measures as appropriate and evaluate response  - Consider cultural and social influences on pain and pain management  - Notify physician/advanced practitioner if interventions unsuccessful or patient reports new pain  Outcome: Progressing     Problem: INFECTION - ADULT  Goal: Absence or prevention of progression during hospitalization  Description: INTERVENTIONS:  - Assess and monitor for signs and symptoms of infection  - Monitor lab/diagnostic results  - Monitor all insertion sites, i e  indwelling lines, tubes, and drains  - Monitor endotracheal if appropriate and nasal secretions for changes in amount and color  - [place] appropriate cooling/warming therapies per order  - Administer medications as ordered  - Instruct and encourage patient and family to use good hand hygiene technique  - Identify and instruct in appropriate isolation precautions for identified infection/condition  Outcome: Progressing  Goal: Absence of fever/infection during neutropenic period  Description: INTERVENTIONS:  - Monitor WBC    Outcome: Progressing     Problem: SAFETY ADULT  Goal: Patient will remain free of falls  Description: INTERVENTIONS:  - Assess patient frequently for physical needs  -  Identify cognitive and physical deficits and behaviors that affect risk of falls    -  [place] fall precautions as indicated by assessment   - Educate patient/family on patient safety including physical limitations  - Instruct patient to call for assistance with activity based on assessment  - Modify environment to reduce risk of injury  - Consider OT/PT consult to assist with strengthening/mobility  Outcome: Progressing  Goal: Maintain or return to baseline ADL function  Description: INTERVENTIONS:  -  Assess patient's ability to carry out ADLs; assess patient's baseline for ADL function and identify physical deficits which impact ability to perform ADLs (bathing, care of mouth/teeth, toileting, grooming, dressing, etc )  - Assess/evaluate cause of self-care deficits   - Assess range of motion  - Assess patient's mobility; develop plan if impaired  - Assess patient's need for assistive devices and provide as appropriate  - Encourage maximum independence but intervene and supervise when necessary  - Involve family in performance of ADLs  - Assess for home care needs following discharge   - Consider OT consult to assist with ADL evaluation and planning for discharge  - Provide patient education as appropriate  Outcome: Progressing  Goal: Maintain or return mobility status to optimal level  Description: INTERVENTIONS:  - Assess patient's baseline mobility status (ambulation, transfers, stairs, etc )    - Identify cognitive and physical deficits and behaviors that affect mobility  - Identify mobility aids required to assist with transfers and/or ambulation (gait belt, sit-to-stand, lift, walker, cane, etc )  - [place] fall precautions as indicated by assessment  - Record patient progress and toleration of activity level on Mobility SBAR; progress patient to next Phase/Stage  - Instruct patient to call for assistance with activity based on assessment  - Consider rehabilitation consult to assist with strengthening/weightbearing, etc   Outcome: Progressing     Problem: DISCHARGE PLANNING  Goal: Discharge to home or other facility with appropriate resources  Description: INTERVENTIONS:  - Identify barriers to discharge w/patient and caregiver  - Arrange for needed discharge resources and transportation as appropriate  - Identify discharge learning needs (meds, wound care, etc )  - - Refer to Case Management Department for coordinating discharge planning if the patient needs post-hospital services based on physician/advanced practitioner order or complex needs related to functional status, cognitive ability, or social support system  Outcome: Progressing     Problem: Knowledge Deficit  Goal: Patient/family/caregiver demonstrates understanding of disease process, treatment plan, medications, and discharge instructions  Description: Complete learning assessment and assess knowledge base    Interventions:  - Provide teaching at level of understanding  - Provide teaching via preferred learning methods  Outcome: Progressing     Problem: CARDIOVASCULAR - ADULT  Goal: Maintains optimal cardiac output and hemodynamic stability  Description: INTERVENTIONS:  - Monitor I/O, vital signs and rhythm  - Monitor for S/S and trends of decreased cardiac output  - Administer and titrate ordered vasoactive medications to optimize hemodynamic stability  - Assess quality of pulses, skin color and temperature  - Assess for signs of decreased coronary artery perfusion  - Instruct patient to report change in severity of symptoms  Outcome: Progressing     Problem: RESPIRATORY - ADULT  Goal: Achieves optimal ventilation and oxygenation  Description: INTERVENTIONS:  - Assess for changes in respiratory status  - Assess for changes in mentation and behavior  - Position to facilitate oxygenation and minimize respiratory effort  - Oxygen administered by appropriate delivery if ordered  - Initiate smoking cessation education as indicated  - Encourage broncho-pulmonary hygiene including cough, deep breathe, Incentive Spirometry  - Assess the need for suctioning and aspirate as needed  - Assess and instruct to report SOB or any respiratory difficulty  - Respiratory Therapy support as indicated  Outcome: Progressing     Problem: SKIN/TISSUE INTEGRITY - ADULT  Goal: Incision(s), wounds(s) or drain site(s) healing without S/S of infection  Description: INTERVENTIONS  - Assess and document risk factors for skin impairment   - Assess and document dressing, incision, wound bed, drain sites and surrounding tissue  - Consider nutrition services referral as needed  - Oral mucous membranes remain intact  - Provide patient/ family education  Outcome: Progressing     Problem: MUSCULOSKELETAL - ADULT  Goal: Maintain proper alignment of affected body part  Description: INTERVENTIONS:  - Support, maintain and protect limb and body alignment  - Provide patient/ family with appropriate education  Outcome: Progressing     Problem: Potential for Falls  Goal: Patient will remain free of falls  Description: INTERVENTIONS:  - Assess patient frequently for physical needs  -  Identify cognitive and physical deficits and behaviors that affect risk of falls    -  [place] fall precautions as indicated by assessment   - Educate patient/family on patient safety including physical limitations  - Instruct patient to call for assistance with activity based on assessment  - Modify environment to reduce risk of injury  - Consider OT/PT consult to assist with strengthening/mobility  Outcome: Progressing     Problem: Prexisting or High Potential for Compromised Skin Integrity  Goal: Skin integrity is maintained or improved  Description: INTERVENTIONS:  - Identify patients at risk for skin breakdown  - Assess and monitor skin integrity  - Assess and monitor nutrition and hydration status  - Monitor labs   - Assess for incontinence   - Turn and reposition patient  - Assist with mobility/ambulation  - Relieve pressure over bony prominences  - Avoid friction and shearing  - Provide appropriate hygiene as needed including keeping skin clean and dry  - Evaluate need for skin moisturizer/barrier cream  - Collaborate with interdisciplinary team   - Patient/family teaching  - Consider wound care consult   Outcome: Progressing

## 2021-01-17 NOTE — PHYSICAL THERAPY NOTE
PT Treatment Note       01/17/21 1002   PT Last Visit   PT Visit Date 01/17/21   Pain Assessment   Pain Assessment Tool 0-10   Pain Score 3   Pain Location/Orientation Orientation: Left; Location: Hip   Pain Onset/Description Descriptor: [address] Pain Intervention(s) Medication (See MAR); Repositioned   Multiple Pain Sites Yes   Pain 2   Pain Score 2 4   Pain Location/Orientation 2 Orientation: Right  (top of foot)   Hospital Pain Intervention(s) 2 Cold applied   Restrictions/Precautions   Weight Bearing Precautions Per Order Yes   LLE Weight Bearing Per Order WBAT   Braces or Orthoses   (surgical shoe RLE)   Other Precautions Pain; Fall Risk;Bed Alarm   General   Chart Reviewed Yes   Response to Previous Treatment Patient with no complaints from previous session  Family/Caregiver Present No   Cognition   Overall Cognitive Status WFL   Arousal/Participation Alert   Attention Within functional limits   Orientation Level Oriented X4   Memory Within functional limits   Following Commands Follows one step commands without difficulty  (at times is resistant to education)   Comments pt agreeable to PT session   Subjective   Subjective pt reports that he can be Indep at home and doesn't needs rehab   Bed Mobility   Supine to Sit 5  Supervision   Additional items Increased time required; Bedrails   Sit to Supine 5  Supervision   Additional items Increased time required; Bedrails   Additional Comments difficulty with scooting in bed   Transfers   Sit to Stand   (CGA)   Additional items Verbal cues; Bedrails;Armrests; Increased time required   Stand to Sit   (CGA)   Additional items Verbal cues; Bedrails;Armrests; Increased time required   Additional Comments using his 3 point method of R LE inside RW and L LE outside RW   Ambulation/Elevation   Gait pattern Improper Weight shift; Antalgic;Decreased foot clearance; Inconsistent mook; Short stride; Step to;Excessively slow  (LLE inside RW and R LE outside RW)   Gait Assistance   (CGA)   Additional items Verbal cues   Assistive Device Rolling walker   Distance 2 ft and 10 ft   Balance   Static Sitting Good   Dynamic Sitting Good   Static Standing Fair   Dynamic Standing Fair -   Ambulatory Fair -   Endurance Deficit   Endurance Deficit Yes   Endurance Deficit Description unable to progress to steps   Activity Tolerance   Activity Tolerance Patient limited by fatigue;Patient limited by pain   Nurse Made Aware ALETHEA Salter visualized results along with ortho   Exercises   Heelslides Sitting;AROM; Left  (x 50)   Hip Flexion Sitting;15 reps;AROM; Left   Knee AROM Long Arc Quad Sitting;15 reps;AROM; Left   TKR Sitting;PROM; Left  (into flexion 30 sec hold x 3)   Assessment   Prognosis Fair   Assessment Pt seen for PT treatment session this date with interventions consisting of gait training w/ emphasis on improving pt's ability to ambulate level surfaces x 2ft and 10 ft with CGA provided by therapist with RW, Therapeutic exercise consisting of: AROM and PROM 15 reps L LE in sitting position and therapeutic activity consisting of training: supine<>sit transfers and sit<>stand transfers  Pt agreeable to PT treatment session upon arrival, pt found supine in bed w/ HOB elevated, in no apparent distress  In comparison to previous session, pt with improvements in distance ambulated compared to yesturday  Post session: pt returned BTB, bed alarm engaged and all needs in reach Continue to recommend STR at time of d/c in order to maximize pt's functional independence and safety w/ mobility  Pt continues to be functioning below baseline level, and remains limited 2* factors listed above and including pain and decreased ROM  PT will continue to see pt while here in order to address the deficits listed above and provide interventions consistent w/ POC in effort to achieve goals     Barriers to Discharge Inaccessible home environment;Decreased caregiver support   Goals   Patient Goals to go home   PT Treatment Day 7   Plan   Progress Slow progress, decreased activity tolerance   Recommendation   PT Discharge Recommendation Post-Acute Rehabilitation Services   PT - OK to Discharge Yes  (to STR)   Ashley Cantrell, PT

## 2021-01-17 NOTE — PLAN OF CARE
Problem: OCCUPATIONAL THERAPY ADULT  Goal: Performs self-care activities at highest level of function for planned discharge setting  See evaluation for individualized goals  Description: Treatment Interventions: ADL retraining, Functional transfer training, Endurance training, Equipment evaluation/education, Neuromuscular reeducation, Energy conservation     See flowsheet documentation for full assessment, interventions and recommendations  Note: Limitation: Decreased ADL status, Decreased Safe judgement during ADL, Decreased endurance, Decreased self-care trans, Decreased high-level ADLs  Prognosis: Good  Assessment: Patient participated in Skilled OT session this date with interventions consisting of  therapeutic activities to: increase activity tolerance, increase dynamic sit/ stand balance during functional activity , increase postural control, increase trunk control and increase OOB/ sitting tolerance   Patient agreeable to OT treatment session, upon arrival patient was found supine in bed and in no apparent distress  In comparison to previous session, patient with improvements in endurance  Patient requiring verbal cues for safety, one step directives and frequent rest periods  Patient continues to be functioning below baseline level, occupational performance remains limited secondary to factors listed above and increased risk for falls and injury  From OT standpoint, recommendation at time of d/c would be Post-Acute Rehabilitation Services  Patient to benefit from continued Occupational Therapy treatment while in the hospital to address deficits as defined above and maximize level of functional independence with ADLs and functional mobility        OT Discharge Recommendation: Post-Acute Rehabilitation Services  OT - OK to Discharge: Yes(Once medically cleared )     Atul Landa OT

## 2021-01-18 ENCOUNTER — TELEPHONE (OUTPATIENT)
Dept: OBGYN CLINIC | Facility: HOSPITAL | Age: 66
End: 2021-01-18

## 2021-01-18 VITALS
DIASTOLIC BLOOD PRESSURE: 55 MMHG | WEIGHT: 195 LBS | HEIGHT: 66 IN | SYSTOLIC BLOOD PRESSURE: 116 MMHG | OXYGEN SATURATION: 99 % | HEART RATE: 74 BPM | TEMPERATURE: 98.1 F | RESPIRATION RATE: 18 BRPM | BODY MASS INDEX: 31.34 KG/M2

## 2021-01-18 LAB
HCT VFR BLD AUTO: 27.2 % (ref 42–47)
HGB BLD-MCNC: 8.8 G/DL (ref 14–18)

## 2021-01-18 PROCEDURE — 85018 HEMOGLOBIN: CPT | Performed by: PHYSICIAN ASSISTANT

## 2021-01-18 PROCEDURE — 85014 HEMATOCRIT: CPT | Performed by: PHYSICIAN ASSISTANT

## 2021-01-18 PROCEDURE — 97110 THERAPEUTIC EXERCISES: CPT

## 2021-01-18 PROCEDURE — NC001 PR NO CHARGE: Performed by: ORTHOPAEDIC SURGERY

## 2021-01-18 PROCEDURE — 97535 SELF CARE MNGMENT TRAINING: CPT

## 2021-01-18 PROCEDURE — 97530 THERAPEUTIC ACTIVITIES: CPT

## 2021-01-18 PROCEDURE — 99024 POSTOP FOLLOW-UP VISIT: CPT | Performed by: ORTHOPAEDIC SURGERY

## 2021-01-18 PROCEDURE — 97116 GAIT TRAINING THERAPY: CPT

## 2021-01-18 RX ORDER — OXYCODONE HYDROCHLORIDE AND ACETAMINOPHEN 5; 325 MG/1; MG/1
1 TABLET ORAL EVERY 4 HOURS PRN
Qty: 50 TABLET | Refills: 0 | Status: SHIPPED | OUTPATIENT
Start: 2021-01-18 | End: 2021-01-28

## 2021-01-18 RX ORDER — DOCUSATE SODIUM 100 MG/1
100 CAPSULE, LIQUID FILLED ORAL 2 TIMES DAILY
Qty: 10 CAPSULE | Refills: 0 | Status: ON HOLD | OUTPATIENT
Start: 2021-01-18 | End: 2022-05-16 | Stop reason: ALTCHOICE

## 2021-01-18 RX ORDER — FOLIC ACID 1 MG/1
1 TABLET ORAL DAILY
Qty: 30 TABLET | Refills: 0 | Status: ON HOLD | OUTPATIENT
Start: 2021-01-18 | End: 2022-05-16 | Stop reason: ALTCHOICE

## 2021-01-18 RX ORDER — CEPHALEXIN 500 MG/1
500 CAPSULE ORAL EVERY 8 HOURS SCHEDULED
Qty: 15 CAPSULE | Refills: 0 | Status: SHIPPED | OUTPATIENT
Start: 2021-01-18 | End: 2021-01-23

## 2021-01-18 RX ORDER — ASPIRIN 325 MG
325 TABLET, DELAYED RELEASE (ENTERIC COATED) ORAL 2 TIMES DAILY
Qty: 30 TABLET | Refills: 0 | Status: ON HOLD | OUTPATIENT
Start: 2021-01-18 | End: 2022-05-16 | Stop reason: ALTCHOICE

## 2021-01-18 RX ADMIN — FOLIC ACID 1 MG: 1 TABLET ORAL at 08:19

## 2021-01-18 RX ADMIN — FERROUS SULFATE TAB 325 MG (65 MG ELEMENTAL FE) 325 MG: 325 (65 FE) TAB at 08:18

## 2021-01-18 RX ADMIN — OXYCODONE HYDROCHLORIDE AND ACETAMINOPHEN 500 MG: 500 TABLET ORAL at 08:18

## 2021-01-18 RX ADMIN — DOCUSATE SODIUM 100 MG: 100 CAPSULE, LIQUID FILLED ORAL at 08:18

## 2021-01-18 RX ADMIN — OXYCODONE HYDROCHLORIDE AND ACETAMINOPHEN 2 TABLET: 5; 325 TABLET ORAL at 02:10

## 2021-01-18 RX ADMIN — FONDAPARINUX SODIUM 2.5 MG: 2.5 INJECTION, SOLUTION SUBCUTANEOUS at 08:19

## 2021-01-18 RX ADMIN — Medication 1 TABLET: at 08:18

## 2021-01-18 NOTE — PLAN OF CARE
Problem: OCCUPATIONAL THERAPY ADULT  Goal: Performs self-care activities at highest level of function for planned discharge setting  See evaluation for individualized goals  Description: Treatment Interventions: ADL retraining, Functional transfer training, Endurance training, Equipment evaluation/education, Neuromuscular reeducation, Energy conservation     See flowsheet documentation for full assessment, interventions and recommendations  Outcome: Progressing, GRADUALLY  Note: Limitation: Decreased ADL status, Decreased Safe judgement during ADL, Decreased endurance, Decreased self-care trans, Decreased high-level ADLs  Prognosis: Good  Assessment: Patient participated in Skilled OT session this date with interventions consisting of ADL re training with the use of correct body mechnaics, safety awareness and fall prevention techniques and  therapeutic activities to: increase activity tolerance   Patient agreeable to OT treatment session, upon arrival patient was found with PT and in attempt to stand from bed  Patient requiring verbal cues for safety, verbal cues for correct technique and frequent rest periods  Patient continues to be functioning below baseline level, occupational performance remains limited secondary to factors listed above and increased risk for falls and injury  From OT standpoint, recommendation at time of d/c would be post-acute rehabilitation services  Patient to benefit from continued Occupational Therapy treatment while in the hospital to address deficits as defined above and maximize level of functional independence with ADLs and functional mobility        OT Discharge Recommendation: Post-Acute Rehabilitation Services  OT - OK to Discharge: Yes(Once medically cleared )     DARREL Armenta

## 2021-01-18 NOTE — DISCHARGE INSTRUCTIONS
TOTAL KNEE/TOTAL HIP REPLACEMENT  POST OPERATIVE INFORMATION    1  You should use a walker or crutches, but you may put as much weight on the leg as is comfortable unless instructed otherwise  2  You may use ice packs as needed for pain and swelling  20 minutes is required to allow the cold to penetrate deep enough  Cover skin with a layer of cloth before applying the ice pack  3  Pump your ankle up and down frequently throughout the day to facilitate circulation, maintain muscle tone, and to aid in reduction of swelling  4  You may shower after 5 days, but remember to keep the dressings dry  5  You should call our office if you experience pain not controlled by your medication, develop a fever, and experience increased drainage or redness around the incision  6  Do not drive a vehicle until you see your physician at the follow-up appointment  7  Refer to your total joint card regarding the need for antibiotics for the following procedures:  Any dental procedures, any infection, especially skin infections, vaginal or GYN exams or surgery, and colonoscopy  8  If you have had a total hip replacement following instructions below to prevent the hip from sliding out a position:   -DO NOT bend your hip more than 90°  This means no squatting, no bending over to tie your shoes, and no bending from the waist to  things from the floor, even if seated  -DO NOT cross your operated leg/foot inward ([place]-toed)   -ALWAYS sleep with pillow or cushion between your legs, as instructed by your doctor   -After surgery these precautions will be again covered by your therapists on a daily basis  9  Call our office for an appointment to see Dr Hossein Padron in about 14 days  10  You should start outpatient therapy as soon as possible after discharge

## 2021-01-18 NOTE — PHYSICAL THERAPY NOTE
01/18/21 0925   PT Last Visit   PT Visit Date 01/18/21   Note Type   Note Type Treatment   Pain Assessment   Pain Assessment Tool 0-10   Pain Score 6   Pain Location/Orientation Orientation: Left; Location: Knee   Restrictions/Precautions   Weight Bearing Precautions Per Order Yes   LLE Weight Bearing Per Order WBAT   Braces or Orthoses Other (Comment)  (R surgical shoe)   Other Precautions Telemetry; Fall Risk;Pain;O2;Multiple lines;WBS   General   Chart Reviewed Yes   Response to Previous Treatment Patient with no complaints from previous session  Family/Caregiver Present No   Cognition   Overall Cognitive Status WFL   Arousal/Participation Alert; Cooperative   Attention Within functional limits   Orientation Level Oriented X4   Memory Within functional limits   Following Commands Follows all commands and directions without difficulty   Comments pt agreeable to PT session   Bed Mobility   Supine to Sit 6  Modified independent   Additional items Bedrails;HOB elevated; Increased time required;Verbal cues   Additional Comments pt OOB in chair to end session, pt sat at EOBx35 minutes to complete LE ther ex   Transfers   Sit to Stand 5  Supervision   Additional items Assist x 1; Increased time required;Verbal cues   Stand to Sit 5  Supervision   Additional items Assist x 1; Armrests; Increased time required;Verbal cues   Ambulation/Elevation   Gait pattern Narrow BLAKE; Decreased foot clearance;Decreased L stance; Step to; Foward flexed   Gait Assistance 4  Minimal assist   Additional items Assist x 1   Assistive Device Rolling walker   Distance 7ftx1, 4 ftx1   Stair Management Assistance Not tested  (unsafe at this time to attempt)   Balance   Static Sitting Good   Dynamic Sitting Fair +   Static Standing Fair -   Dynamic Standing Poor +   Ambulatory Poor +   Endurance Deficit   Endurance Deficit Yes   Endurance Deficit Description unable to progress to steps   Activity Tolerance   Activity Tolerance Patient limited by pain   Nurse Made Aware yes, RN Doug Cordoba made aware of outcomes   Exercises   Quad Sets Supine;20 reps;AROM; Left   Heelslides Sitting;20 reps; Supine;Left;AAROM   Glute Sets Sitting;20 reps   Hip Flexion Supine;20 reps;AROM; Left   Hip Abduction Supine;Sitting;20 reps;AROM; Left   Hip Adduction Sitting;20 reps;AROM; Bilateral   Knee PROM Flexion Sitting;PROM; Left  (:30x4)   Knee AROM Long Arc Quad Sitting;20 reps;AROM; Left   Ankle Pumps Supine;Sitting;20 reps;AROM; Bilateral   Marching Sitting;20 reps;AAROM; Left   Assessment   Prognosis Good   Problem List Decreased strength;Decreased range of motion;Decreased endurance; Impaired balance;Decreased mobility; Impaired judgement;Decreased safety awareness;Orthopedic restrictions;Pain   Assessment Pt seen for PT treatment session this date with interventions consisting of gait training w/ emphasis on improving pt's ability to ambulate level surfaces x 7 ftx1, 4ft x1 with min A provided by therapist with RW, Therapeutic exercise consisting of: AROM, AAROM and PROM 20 reps B LE and L LE in sitting and supine position and therapeutic activity consisting of training: bed mobility, supine<>sit transfers, sit<>stand transfers, static sitting tolerance at EOB for 35 minutes w/ min UE support, vc and tactile cues for static sitting posture faciliation and vc and tactile cues for static standing posture faciliation  Pt agreeable to PT treatment session upon arrival, pt found supine in bed w/ HOB elevated, in no apparent distress, A&O x 4 and responsive  In comparison to previous session, pt with no improvements as evidenced by unable to advance ambulation or safely attempt stairs  Post session: pt returned back to recliner, all needs in reach and RN notified of session findings/recommendations Continue to recommend STR at time of d/c in order to maximize pt's functional independence and safety w/ mobility   Pt continues to be functioning below baseline level, and remains limited 2* factors listed above and including unable to ambulate household distances, difficulty with transfers and unable to safely attempt stair climbing  PT will continue to see pt while here in order to address the deficits listed above and provide interventions consistent w/ POC in effort to achieve STGs  Barriers to Discharge Decreased caregiver support   Barriers to Discharge Comments pt has 2 ISMAEL   Goals   Patient Goals to go home   Plan   Treatment/Interventions Functional transfer training;LE strengthening/ROM; Therapeutic exercise; Endurance training;Bed mobility;Gait training   Progress No functional improvements   PT Frequency Twice a day   Recommendation   PT Discharge Recommendation Post-Acute Rehabilitation Services   Equipment Recommended Walker   PT - OK to Discharge Yes  (when medically stable:if to STR)   Additional Comments upon conclusion, pt OOB in chair with all needs in reach

## 2021-01-18 NOTE — OCCUPATIONAL THERAPY NOTE
01/18/21 0949   OT Last Visit   OT Visit Date 01/18/21   Note Type   Note Type Treatment   Restrictions/Precautions   Weight Bearing Precautions Per Order Yes   LLE Weight Bearing Per Order WBAT   Other Precautions Impulsive; Fall Risk;Pain  (ignores safe method recommendations at times)   General   Response to Previous Treatment Patient with no complaints from previous session   Pain Assessment   Pain Assessment Tool 0-10   Pain Score 5  (with movement;  "2" at rest)   Pain Location/Orientation Orientation: Left; Location: Knee   Hospital Pain Intervention(s) Repositioned   ADL   Where Assessed Chair  (patient proceeds per his own techniques)   Equipment Provided   (Instructed in benefits of using LHAE-he's familiar with same)   UB Bathing Assistance 5  Supervision/Setup   UB Bathing Deficit Setup; Increased time to complete   LB Bathing Comments patient deferred at this time    LB Dressing Assistance 4  Minimal Assistance   LB Dressing Deficit Setup;Verbal cueing; Increased time to complete   LB Dressing Comments patient already had shoes donned and he chose not to remove them , Thus he required Assist  to get pants legs over his feet  (he removed and donned pants by shifting back/forth in chair )   Bed Mobility   Additional Comments please see PT note   Transfers   Sit to Stand 5  Supervision   Additional items Assist x 1; Increased time required;Verbal cues   Stand to Sit 5  Supervision   Additional items Assist x 1; Armrests; Increased time required;Verbal cues   Functional Mobility   Additional Comments patient became tired, was unable to return to bed from doorway of room (chair provided)  He then did stand again to transfer into a chair to the session with bathing and dressing  Coordination   Gross Motor WFL  (patient cites 'poor joints' throughout his body )   Dexterity WFL   Cognition   Overall Cognitive Status WFL   Arousal/Participation Alert; Cooperative   Attention Within functional limits   Comments frustrated at times with mobility difficulties   Activity Tolerance   Activity Tolerance Patient limited by pain   Assessment   Assessment Patient participated in Skilled OT session this date with interventions consisting of ADL re training with the use of correct body mechnaics, safety awareness and fall prevention techniques and  therapeutic activities to: increase activity tolerance   Patient agreeable to OT treatment session, upon arrival patient was found with PT and in attempt to stand from bed  Patient requiring verbal cues for safety, verbal cues for correct technique and frequent rest periods  Patient continues to be functioning below baseline level, occupational performance remains limited secondary to factors listed above and increased risk for falls and injury  From OT standpoint, recommendation at time of d/c would be post-acute rehabilitation services  Patient to benefit from continued Occupational Therapy treatment while in the hospital to address deficits as defined above and maximize level of functional independence with ADLs and functional mobility  Plan   Treatment Interventions ADL retraining;Functional transfer training;Patient/family training; Activityengagement   Goal Expiration Date 01/23/21   OT Treatment Day 5        01/18/21 0949   OT Last Visit   OT Visit Date 01/18/21   Note Type   Note Type Treatment   Restrictions/Precautions   Weight Bearing Precautions Per Order Yes   LLE Weight Bearing Per Order WBAT   Other Precautions Impulsive; Fall Risk;Pain  (ignores safe method recommendations at times)   General   Response to Previous Treatment Patient with no complaints from previous session   Pain Assessment   Pain Assessment Tool 0-10   Pain Score 5  (with movement;  "2" at rest)   Pain Location/Orientation Orientation: Left; Location: Knee   Hospital Pain Intervention(s) Repositioned   ADL   Where Assessed Chair  (patient proceeds per his own techniques)   Equipment Provided (Instructed in benefits of using LHAE-he's familiar with same)   UB Bathing Assistance 5  Supervision/Setup   UB Bathing Deficit Setup; Increased time to complete   LB Bathing Comments patient deferred at this time    LB Dressing Assistance 4  Minimal Assistance   LB Dressing Deficit Setup;Verbal cueing; Increased time to complete   LB Dressing Comments patient already had shoes donned and he chose not to remove them , Thus he required Assist  to get pants legs over his feet  (he removed and donned pants by shifting back/forth in chair )   Bed Mobility   Additional Comments please see PT note   Transfers   Sit to Stand 5  Supervision   Additional items Assist x 1; Increased time required;Verbal cues   Stand to Sit 5  Supervision   Additional items Assist x 1; Armrests; Increased time required;Verbal cues   Functional Mobility   Additional Comments patient became tired, was unable to return to bed from doorway of room (chair provided)  He then did stand again to transfer into a chair to the session with bathing and dressing  Coordination   Gross Motor WFL  (patient cites 'poor joints' throughout his body )   Dexterity WFL   Cognition   Overall Cognitive Status WFL   Arousal/Participation Alert; Cooperative   Attention Within functional limits   Comments frustrated at times with mobility difficulties   Activity Tolerance   Activity Tolerance Patient limited by pain   Assessment   Assessment Patient participated in Skilled OT session this date with interventions consisting of ADL re training with the use of correct body mechnaics, safety awareness and fall prevention techniques and  therapeutic activities to: increase activity tolerance   Patient agreeable to OT treatment session, upon arrival patient was found with PT and in attempt to stand from bed  Patient requiring verbal cues for safety, verbal cues for correct technique and frequent rest periods   Patient continues to be functioning below baseline level, occupational performance remains limited secondary to factors listed above and increased risk for falls and injury  From OT standpoint, recommendation at time of d/c would be post-acute rehabilitation services  Patient to benefit from continued Occupational Therapy treatment while in the hospital to address deficits as defined above and maximize level of functional independence with ADLs and functional mobility  Plan   Treatment Interventions ADL retraining;Functional transfer training;Patient/family training; Activityengagement   Goal Expiration Date 01/23/21   OT Treatment Day [id number] GRAEME Terry/L

## 2021-01-18 NOTE — PROGRESS NOTES
Progress Note - Orthopedics   Rae Puri 77 y o  male MRN: [id number]  Unit/Bed#: -01 Encounter: [id number]    Assessment:  POD 5 s/p left total knee replacement    Plan:  Discharge to home with home health care today   mg twice a day for DVT prophylaxis  Weight-bearing as tolerated  Continue physical therapy and occupational therapy  Acute postop blood-loss anemia continue to monitor    Weight bearing:  Weight-bearing as tolerated    VTE Pharmacologic Prophylaxis: Fondaparinux (Arixtra)  VTE Mechanical Prophylaxis: sequential compression device    Subjective:  Ready to go home today  His foot pain has improved  Vitals: Blood pressure 116/55, pulse 74, temperature 98 1 °F (36 7 °C), temperature source Temporal, resp  rate 18, height 5' 6" (1 676 m), weight 88 5 kg (195 lb), SpO2 99 %  ,Body mass index is 31 47 kg/m²  Intake/Output Summary (Last 24 hours) at 1/18/2021 0826  Last data filed at 1/18/2021 0208  Gross per 24 hour   Intake 480 ml   Output 925 ml   Net -445 ml       Invasive Devices     Peripheral Intravenous Line            Peripheral IV 01/16/21 Dorsal (posterior); Left Hand 1 day          Drain            Autologus Reinfusion/Drain Device 1 Left Knee 4 days                Physical Exam:   Left lower extremity is neurovascularly intact  Toes are pink and mobile  Compartments are soft  Dressing is clean, dry and intact  Good dorsiflexion and plantar flexion of ankle  Negative Homans sign  Sensation intact  Brisk cap refill    Lab, Imaging and other studies:   CBC:   Lab Results   Component Value Date    HGB 8 8 (L) 01/18/2021    HCT 27 2 (L) 01/18/2021

## 2021-01-18 NOTE — PLAN OF CARE
Problem: PHYSICAL THERAPY ADULT  Goal: Performs mobility at highest level of function for planned discharge setting  See evaluation for individualized goals  Description: Treatment/Interventions: Functional transfer training, LE strengthening/ROM, Elevations, Therapeutic exercise, Endurance training, Patient/family training, Equipment eval/education, Bed mobility, Gait training, Spoke to nursing, Spoke to case management  Equipment Recommended: (continue RW use)       See flowsheet documentation for full assessment, interventions and recommendations  Outcome: Not Progressing  Note: Prognosis: Good  Problem List: Decreased strength, Decreased range of motion, Decreased endurance, Impaired balance, Decreased mobility, Impaired judgement, Decreased safety awareness, Orthopedic restrictions, Pain  Assessment: Pt seen for PT treatment session this date with interventions consisting of gait training w/ emphasis on improving pt's ability to ambulate level surfaces x 7 ftx1, 4ft x1 with min A provided by therapist with RW, Therapeutic exercise consisting of: AROM, AAROM and PROM 20 reps B LE and L LE in sitting and supine position and therapeutic activity consisting of training: bed mobility, supine<>sit transfers, sit<>stand transfers, static sitting tolerance at EOB for 35 minutes w/ min UE support, vc and tactile cues for static sitting posture faciliation and vc and tactile cues for static standing posture faciliation  Pt agreeable to PT treatment session upon arrival, pt found supine in bed w/ HOB elevated, in no apparent distress, A&O x 4 and responsive  In comparison to previous session, pt with no improvements as evidenced by unable to advance ambulation or safely attempt stairs  Post session: pt returned back to recliner, all needs in reach and RN notified of session findings/recommendations Continue to recommend STR at time of d/c in order to maximize pt's functional independence and safety w/ mobility   Pt continues to be functioning below baseline level, and remains limited 2* factors listed above and including unable to ambulate household distances, difficulty with transfers and unable to safely attempt stair climbing  PT will continue to see pt while here in order to address the deficits listed above and provide interventions consistent w/ POC in effort to achieve STGs  Barriers to Discharge: Decreased caregiver support  Barriers to Discharge Comments: pt has 2 ISMAEL  PT Discharge Recommendation: Post-Acute Rehabilitation Services     PT - OK to Discharge: Yes(when medically stable:if to STR)    See flowsheet documentation for full assessment

## 2021-01-18 NOTE — CASE MANAGEMENT
Therapy expressed concerns for pt returning home and not going to STR  CM again met with pt at bedside to express concerns for pt not being able to care for self and potential for falling if he returns home  Pt reiterates he is adamant he does not want to go to STR  He states his son and his brother live close and can assist him as needed  His brother will transport him home and assist him into the home when he returns  Pt will have [place] upon return home to assist as well

## 2021-01-18 NOTE — CASE MANAGEMENT
Pt for d/c home today per Jeff Malagon  Pt will return home with [facility]illip [facility]  [facility] aware of d/c and AVS faxed to them as requested

## 2021-01-18 NOTE — PLAN OF CARE
Pt discharged at this time   Problem: PAIN - ADULT  Goal: Verbalizes/displays adequate comfort level or baseline comfort level  Description: Interventions:  - Encourage patient to monitor pain and request assistance  - Assess pain using appropriate pain scale  - Administer analgesics based on type and severity of pain and evaluate response  - Implement non-pharmacological measures as appropriate and evaluate response  - Consider cultural and social influences on pain and pain management  - Notify physician/advanced practitioner if interventions unsuccessful or patient reports new pain  Outcome: Adequate for Discharge     Problem: INFECTION - ADULT  Goal: Absence or prevention of progression during hospitalization  Description: INTERVENTIONS:  - Assess and monitor for signs and symptoms of infection  - Monitor lab/diagnostic results  - Monitor all insertion sites, i e  indwelling lines, tubes, and drains  - Monitor endotracheal if appropriate and nasal secretions for changes in amount and color  - [place] appropriate cooling/warming therapies per order  - Administer medications as ordered  - Instruct and encourage patient and family to use good hand hygiene technique  - Identify and instruct in appropriate isolation precautions for identified infection/condition  Outcome: Adequate for Discharge  Goal: Absence of fever/infection during neutropenic period  Description: INTERVENTIONS:  - Monitor WBC    Outcome: Adequate for Discharge     Problem: SAFETY ADULT  Goal: Patient will remain free of falls  Description: INTERVENTIONS:  - Assess patient frequently for physical needs  -  Identify cognitive and physical deficits and behaviors that affect risk of falls    -  [place] fall precautions as indicated by assessment   - Educate patient/family on patient safety including physical limitations  - Instruct patient to call for assistance with activity based on assessment  - Modify environment to reduce risk of injury  - Consider OT/PT consult to assist with strengthening/mobility  Outcome: Adequate for Discharge  Goal: Maintain or return to baseline ADL function  Description: INTERVENTIONS:  -  Assess patient's ability to carry out ADLs; assess patient's baseline for ADL function and identify physical deficits which impact ability to perform ADLs (bathing, care of mouth/teeth, toileting, grooming, dressing, etc )  - Assess/evaluate cause of self-care deficits   - Assess range of motion  - Assess patient's mobility; develop plan if impaired  - Assess patient's need for assistive devices and provide as appropriate  - Encourage maximum independence but intervene and supervise when necessary  - Involve family in performance of ADLs  - Assess for home care needs following discharge   - Consider OT consult to assist with ADL evaluation and planning for discharge  - Provide patient education as appropriate  Outcome: Adequate for Discharge  Goal: Maintain or return mobility status to optimal level  Description: INTERVENTIONS:  - Assess patient's baseline mobility status (ambulation, transfers, stairs, etc )    - Identify cognitive and physical deficits and behaviors that affect mobility  - Identify mobility aids required to assist with transfers and/or ambulation (gait belt, sit-to-stand, lift, walker, cane, etc )  - [place] fall precautions as indicated by assessment  - Record patient progress and toleration of activity level on Mobility SBAR; progress patient to next Phase/Stage  - Instruct patient to call for assistance with activity based on assessment  - Consider rehabilitation consult to assist with strengthening/weightbearing, etc   Outcome: Adequate for Discharge     Problem: DISCHARGE PLANNING  Goal: Discharge to home or other facility with appropriate resources  Description: INTERVENTIONS:  - Identify barriers to discharge w/patient and caregiver  - Arrange for needed discharge resources and transportation as appropriate  - Identify discharge learning needs (meds, wound care, etc )  - - Refer to Case Management Department for coordinating discharge planning if the patient needs post-hospital services based on physician/advanced practitioner order or complex needs related to functional status, cognitive ability, or social support system  Outcome: Adequate for Discharge     Problem: Knowledge Deficit  Goal: Patient/family/caregiver demonstrates understanding of disease process, treatment plan, medications, and discharge instructions  Description: Complete learning assessment and assess knowledge base    Interventions:  - Provide teaching at level of understanding  - Provide teaching via preferred learning methods  Outcome: Adequate for Discharge     Problem: CARDIOVASCULAR - ADULT  Goal: Maintains optimal cardiac output and hemodynamic stability  Description: INTERVENTIONS:  - Monitor I/O, vital signs and rhythm  - Monitor for S/S and trends of decreased cardiac output  - Administer and titrate ordered vasoactive medications to optimize hemodynamic stability  - Assess quality of pulses, skin color and temperature  - Assess for signs of decreased coronary artery perfusion  - Instruct patient to report change in severity of symptoms  Outcome: Adequate for Discharge     Problem: RESPIRATORY - ADULT  Goal: Achieves optimal ventilation and oxygenation  Description: INTERVENTIONS:  - Assess for changes in respiratory status  - Assess for changes in mentation and behavior  - Position to facilitate oxygenation and minimize respiratory effort  - Oxygen administered by appropriate delivery if ordered  - Initiate smoking cessation education as indicated  - Encourage broncho-pulmonary hygiene including cough, deep breathe, Incentive Spirometry  - Assess the need for suctioning and aspirate as needed  - Assess and instruct to report SOB or any respiratory difficulty  - Respiratory Therapy support as indicated  Outcome: Adequate for Discharge Problem: SKIN/TISSUE INTEGRITY - ADULT  Goal: Incision(s), wounds(s) or drain site(s) healing without S/S of infection  Description: INTERVENTIONS  - Assess and document risk factors for skin impairment   - Assess and document dressing, incision, wound bed, drain sites and surrounding tissue  - Consider nutrition services referral as needed  - Oral mucous membranes remain intact  - Provide patient/ family education  Outcome: Adequate for Discharge     Problem: MUSCULOSKELETAL - ADULT  Goal: Maintain proper alignment of affected body part  Description: INTERVENTIONS:  - Support, maintain and protect limb and body alignment  - Provide patient/ family with appropriate education  Outcome: Adequate for Discharge     Problem: Potential for Falls  Goal: Patient will remain free of falls  Description: INTERVENTIONS:  - Assess patient frequently for physical needs  -  Identify cognitive and physical deficits and behaviors that affect risk of falls    -  [place] fall precautions as indicated by assessment   - Educate patient/family on patient safety including physical limitations  - Instruct patient to call for assistance with activity based on assessment  - Modify environment to reduce risk of injury  - Consider OT/PT consult to assist with strengthening/mobility  Outcome: Adequate for Discharge     Problem: Prexisting or High Potential for Compromised Skin Integrity  Goal: Skin integrity is maintained or improved  Description: INTERVENTIONS:  - Identify patients at risk for skin breakdown  - Assess and monitor skin integrity  - Assess and monitor nutrition and hydration status  - Monitor labs   - Assess for incontinence   - Turn and reposition patient  - Assist with mobility/ambulation  - Relieve pressure over bony prominences  - Avoid friction and shearing  - Provide appropriate hygiene as needed including keeping skin clean and dry  - Evaluate need for skin moisturizer/barrier cream  - Collaborate with interdisciplinary team   - Patient/family teaching  - Consider wound care consult   Outcome: Adequate for Discharge

## 2021-01-18 NOTE — TELEPHONE ENCOUNTER
He may alternate with this medicine  He may take 3 over-the-counter pills at 1 time    This will make it 600 mg

## 2021-01-18 NOTE — NURSING NOTE
Pt discharged at this time  All belongings sent home with patient  All discharge instructions reviewed with pt and all questions answered to pt's satisfaction  Pt taken down via wheelchair to meet brother  Pt required additional help getting into car  Pt required two RN's to help him  Pt refusing rehab, RN offered pt choice of rehab one more time but pt still refused  Pt stated he is going home  Pt and brother informed to come back to ER if pt not able to get pt into house

## 2021-01-18 NOTE — PLAN OF CARE
Problem: PAIN - ADULT  Goal: Verbalizes/displays adequate comfort level or baseline comfort level  Description: Interventions:  - Encourage patient to monitor pain and request assistance  - Assess pain using appropriate pain scale  - Administer analgesics based on type and severity of pain and evaluate response  - Implement non-pharmacological measures as appropriate and evaluate response  - Consider cultural and social influences on pain and pain management  - Notify physician/advanced practitioner if interventions unsuccessful or patient reports new pain  Outcome: Progressing     Problem: INFECTION - ADULT  Goal: Absence or prevention of progression during hospitalization  Description: INTERVENTIONS:  - Assess and monitor for signs and symptoms of infection  - Monitor lab/diagnostic results  - Monitor all insertion sites, i e  indwelling lines, tubes, and drains  - Monitor endotracheal if appropriate and nasal secretions for changes in amount and color  - [place] appropriate cooling/warming therapies per order  - Administer medications as ordered  - Instruct and encourage patient and family to use good hand hygiene technique  - Identify and instruct in appropriate isolation precautions for identified infection/condition  Outcome: Progressing  Goal: Absence of fever/infection during neutropenic period  Description: INTERVENTIONS:  - Monitor WBC    Outcome: Progressing     Problem: SAFETY ADULT  Goal: Patient will remain free of falls  Description: INTERVENTIONS:  - Assess patient frequently for physical needs  -  Identify cognitive and physical deficits and behaviors that affect risk of falls    -  [place] fall precautions as indicated by assessment   - Educate patient/family on patient safety including physical limitations  - Instruct patient to call for assistance with activity based on assessment  - Modify environment to reduce risk of injury  - Consider OT/PT consult to assist with strengthening/mobility  Outcome: Progressing  Goal: Maintain or return to baseline ADL function  Description: INTERVENTIONS:  -  Assess patient's ability to carry out ADLs; assess patient's baseline for ADL function and identify physical deficits which impact ability to perform ADLs (bathing, care of mouth/teeth, toileting, grooming, dressing, etc )  - Assess/evaluate cause of self-care deficits   - Assess range of motion  - Assess patient's mobility; develop plan if impaired  - Assess patient's need for assistive devices and provide as appropriate  - Encourage maximum independence but intervene and supervise when necessary  - Involve family in performance of ADLs  - Assess for home care needs following discharge   - Consider OT consult to assist with ADL evaluation and planning for discharge  - Provide patient education as appropriate  Outcome: Progressing  Goal: Maintain or return mobility status to optimal level  Description: INTERVENTIONS:  - Assess patient's baseline mobility status (ambulation, transfers, stairs, etc )    - Identify cognitive and physical deficits and behaviors that affect mobility  - Identify mobility aids required to assist with transfers and/or ambulation (gait belt, sit-to-stand, lift, walker, cane, etc )  - [place] fall precautions as indicated by assessment  - Record patient progress and toleration of activity level on Mobility SBAR; progress patient to next Phase/Stage  - Instruct patient to call for assistance with activity based on assessment  - Consider rehabilitation consult to assist with strengthening/weightbearing, etc   Outcome: Progressing     Problem: DISCHARGE PLANNING  Goal: Discharge to home or other facility with appropriate resources  Description: INTERVENTIONS:  - Identify barriers to discharge w/patient and caregiver  - Arrange for needed discharge resources and transportation as appropriate  - Identify discharge learning needs (meds, wound care, etc )  - - Refer to Case Management Department for coordinating discharge planning if the patient needs post-hospital services based on physician/advanced practitioner order or complex needs related to functional status, cognitive ability, or social support system  Outcome: Progressing     Problem: Knowledge Deficit  Goal: Patient/family/caregiver demonstrates understanding of disease process, treatment plan, medications, and discharge instructions  Description: Complete learning assessment and assess knowledge base    Interventions:  - Provide teaching at level of understanding  - Provide teaching via preferred learning methods  Outcome: Progressing     Problem: CARDIOVASCULAR - ADULT  Goal: Maintains optimal cardiac output and hemodynamic stability  Description: INTERVENTIONS:  - Monitor I/O, vital signs and rhythm  - Monitor for S/S and trends of decreased cardiac output  - Administer and titrate ordered vasoactive medications to optimize hemodynamic stability  - Assess quality of pulses, skin color and temperature  - Assess for signs of decreased coronary artery perfusion  - Instruct patient to report change in severity of symptoms  Outcome: Progressing     Problem: RESPIRATORY - ADULT  Goal: Achieves optimal ventilation and oxygenation  Description: INTERVENTIONS:  - Assess for changes in respiratory status  - Assess for changes in mentation and behavior  - Position to facilitate oxygenation and minimize respiratory effort  - Oxygen administered by appropriate delivery if ordered  - Initiate smoking cessation education as indicated  - Encourage broncho-pulmonary hygiene including cough, deep breathe, Incentive Spirometry  - Assess the need for suctioning and aspirate as needed  - Assess and instruct to report SOB or any respiratory difficulty  - Respiratory Therapy support as indicated  Outcome: Progressing     Problem: SKIN/TISSUE INTEGRITY - ADULT  Goal: Incision(s), wounds(s) or drain site(s) healing without S/S of infection  Description: INTERVENTIONS  - Assess and document risk factors for skin impairment   - Assess and document dressing, incision, wound bed, drain sites and surrounding tissue  - Consider nutrition services referral as needed  - Oral mucous membranes remain intact  - Provide patient/ family education  Outcome: Progressing     Problem: MUSCULOSKELETAL - ADULT  Goal: Maintain proper alignment of affected body part  Description: INTERVENTIONS:  - Support, maintain and protect limb and body alignment  - Provide patient/ family with appropriate education  Outcome: Progressing     Problem: Potential for Falls  Goal: Patient will remain free of falls  Description: INTERVENTIONS:  - Assess patient frequently for physical needs  -  Identify cognitive and physical deficits and behaviors that affect risk of falls    -  [place] fall precautions as indicated by assessment   - Educate patient/family on patient safety including physical limitations  - Instruct patient to call for assistance with activity based on assessment  - Modify environment to reduce risk of injury  - Consider OT/PT consult to assist with strengthening/mobility  Outcome: Progressing     Problem: Prexisting or High Potential for Compromised Skin Integrity  Goal: Skin integrity is maintained or improved  Description: INTERVENTIONS:  - Identify patients at risk for skin breakdown  - Assess and monitor skin integrity  - Assess and monitor nutrition and hydration status  - Monitor labs   - Assess for incontinence   - Turn and reposition patient  - Assist with mobility/ambulation  - Relieve pressure over bony prominences  - Avoid friction and shearing  - Provide appropriate hygiene as needed including keeping skin clean and dry  - Evaluate need for skin moisturizer/barrier cream  - Collaborate with interdisciplinary team   - Patient/family teaching  - Consider wound care consult   Outcome: Progressing

## 2021-01-18 NOTE — TELEPHONE ENCOUNTER
[place] [place]  [phone]    Dr Corky Olmos    Patient states the Oxycodone is not providing any relief and would like to substitute 600 mg Ibuprofen  Please return call

## 2021-01-18 NOTE — DISCHARGE SUMMARY
This is an addendum to a discharge summary on 1/15/2021  The patient was able to be discharged to home with home health care on 01/18/2021

## 2021-01-19 ENCOUNTER — PATIENT OUTREACH (OUTPATIENT)
Dept: CASE MANAGEMENT | Facility: OTHER | Age: 66
End: 2021-01-19

## 2021-01-19 NOTE — TELEPHONE ENCOUNTER
Spoke to patient, he stated he has already spoke to someone in regards to this and has no further questions

## 2021-01-19 NOTE — PROGRESS NOTES
Outreach TC to patient for initial care management assessment  Patient reports that he is feeling well  Patient denies complications from L TKR  Patient reports that he is independent with ADL's  Patient does need assist with IADLs  Patient resides alone but has a son nearby who is able to assist   Patient did complete a [facility] appointment yet but has  made a f/u with orthopedics on 1/25/21  Patient states he has a regular appointment with his PCP on 2/24/21  Patient declined reviewing medications  Reviewed future appointments, s/sx to report and how/when to report symptoms to provider vs  911  Patient and CG verbalize understanding  Patient educated on role of CM, contact information for CM and BPCI program   Patient declines  additional calls  Chart review will continue until end of episode

## 2021-01-20 ENCOUNTER — TELEPHONE (OUTPATIENT)
Dept: OBGYN CLINIC | Facility: HOSPITAL | Age: 66
End: 2021-01-20

## 2021-01-20 NOTE — TELEPHONE ENCOUNTER
Spoke with pt for post-op assessment   "doing good today"  Taking ibuprofen 600mg every twice daily as advised by Dr Hernandez Nim  Not using percocet or tylenol  AVS, AVS med list and F/Us reviewed with pt  LBM today, no bloody stools  Denies bleeding from any source  Dressing just changed this AM, incision "looks fine", no redness/drainage  Doing betadine to incision 3x per day  amb with RW, denies falls  Swelling mild  Icing and elevating multiple times per day  Aspirin 325mg twice daily, reminded for blood clot prevention  Keflex 500mg three times daily  [facility] calling today with time they are to report tomorrow  Aware of surgeon follow up assessment, denies barriers  Denies having concerning symptoms  Denies fevers, calf pain, CP, SOB or dizziness  Pt denies having questions

## 2021-01-25 ENCOUNTER — OFFICE VISIT (OUTPATIENT)
Dept: OBGYN CLINIC | Facility: CLINIC | Age: 66
End: 2021-01-25

## 2021-01-25 VITALS
SYSTOLIC BLOOD PRESSURE: 106 MMHG | HEIGHT: 66 IN | DIASTOLIC BLOOD PRESSURE: 73 MMHG | BODY MASS INDEX: 31.34 KG/M2 | WEIGHT: 195 LBS | HEART RATE: 114 BPM

## 2021-01-25 DIAGNOSIS — Z96.652 S/P TKR (TOTAL KNEE REPLACEMENT) USING CEMENT, LEFT: Primary | ICD-10-CM

## 2021-01-25 PROCEDURE — 99024 POSTOP FOLLOW-UP VISIT: CPT | Performed by: ORTHOPAEDIC SURGERY

## 2021-01-25 NOTE — PROGRESS NOTES
ASSESSMENT/PLAN:    Diagnoses and all orders for this visit:    S/P TKR (total knee replacement) using cement, left  -     XR knee 3 vw left non injury; Future        X-rays of the patient's left knee show well-seated left total knee replacement  The prosthesis is in good alignment  There are no signs of loosening  There are no fractures or dislocations  His staples were removed and his incision is clean, dry and intact  The patient should begin outpatient physical therapy next week for strengthening, stretching and range of motion  He should also take aspirin 325 mg daily instead of twice per day  He will follow up with our office in 1 month for strength and motion check  The patient is acceptable to this plan  Return in about 1 month (around 2/25/2021)  _____________________________________________________  CHIEF COMPLAINT:  Chief Complaint   Patient presents with    Left Knee - Post-op         SUBJECTIVE:  Brendan Mullins is a 77 y o  male status post left total knee replacement from 01/13/2021  Presents to our office for a 2 week postop visit  The patient states he has been doing well since surgery  He denies any significant left knee pain  Most of his pain is in his right foot, as he had an old traumatic injury to that foot  He denies any numbness or tingling  He denies any fever or chills  He has been taking aspirin 325 mg b i d  For DVT prophylaxis  He is ambulating with a walker      The following portions of the patient's history were reviewed and updated as appropriate: allergies, current medications, past family history, past medical history, past social history, past surgical history and problem list     PAST MEDICAL HISTORY:  Past Medical History:   Diagnosis Date    Arthritis     Rotator cuff injury     left shoulder       PAST SURGICAL HISTORY:  Past Surgical History:   Procedure Laterality Date    JOINT REPLACEMENT Right 2018    MULTIPLE TOOTH EXTRACTIONS      in office under local    [place] TOTAL KNEE ARTHROPLASTY Right 2/26/2019    Procedure: ARTHROPLASTY KNEE TOTAL;  Surgeon: Yunior Trevino DO;  Location: [facility] MAIN OR;  Service: Orthopedics    [place] TOTAL KNEE ARTHROPLASTY Left 1/13/2021    Procedure: KNEE TOTAL ARTHROPLASTY;  Surgeon: Yunior Trevino DO;  Location: [facility] MAIN OR;  Service: Orthopedics    TONSILLECTOMY         FAMILY HISTORY:  Family History   Problem Relation Age of Onset    No Known Problems Mother     Ulcerative colitis Father     Ulcers Father     No Known Problems Sister     No Known Problems Brother        SOCIAL HISTORY:  Social History     Tobacco Use    Smoking status: Light Tobacco Smoker     Packs/day: 0 25     Years: 51 00     Pack years: 12 75     Types: Cigarettes    Smokeless tobacco: Current User    Tobacco comment: 2 packs a week   Substance Use Topics    Alcohol use:  Yes     Alcohol/week: 4 0 standard drinks     Types: 3 Cans of beer, 1 Shots of liquor per week     Comment: 4-6 drinks a week    Drug use: Never       MEDICATIONS:    Current Outpatient Medications:     ascorbic acid (VITAMIN C) 500 MG tablet, Take 1 tablet (500 mg total) by mouth 2 (two) times a day (Patient taking differently: Take 500 mg by mouth daily ), Disp: 60 tablet, Rfl: 1    aspirin (ECOTRIN) 325 mg EC tablet, Take 1 tablet (325 mg total) by mouth 2 (two) times a day, Disp: 30 tablet, Rfl: 0    B COMPLEX-C-FOLIC ACID PO, Take 1 tablet by mouth daily, Disp: , Rfl:     docusate sodium (COLACE) 100 mg capsule, Take 1 capsule (100 mg total) by mouth 2 (two) times a day, Disp: 10 capsule, Rfl: 0    ferrous sulfate 324 (65 Fe) mg, Take 1 tablet (324 mg total) by mouth 2 (two) times a day before meals (Patient taking differently: Take 324 mg by mouth daily before breakfast ), Disp: , Rfl: 0    folic acid (FOLVITE) 1 mg tablet, Take 1 tablet (1 mg total) by mouth daily, Disp: 30 tablet, Rfl: 0    Multiple Vitamins-Minerals (multivitamin with minerals) tablet, Take 1 tablet by mouth daily, Disp: 30 tablet, Rfl: 1    oxyCODONE-acetaminophen (PERCOCET) 5-325 mg per tablet, Take 1 tablet by mouth every 4 (four) hours as needed for moderate pain for up to 10 daysMax Daily Amount: 6 tablets, Disp: 50 tablet, Rfl: 0    ALLERGIES:  No Known Allergies    ROS:  Review of Systems     Constitutional: Negative for fatigue, fever or loss of appetite  HENT: Negative  Respiratory: Negative for shortness of breath, dyspnea  Cardiovascular: Negative for chest pain/tightness  Gastrointestinal: Negative for abdominal pain, N/V  Endocrine: Negative for cold/heat intolerance, unexplained weight loss/gain  Genitourinary: Negative for flank pain, dysuria, hematuria  Musculoskeletal: Positive for arthralgia   Skin: Negative for rash  Neurological: Negative for numbness or tingling  Psychiatric/Behavioral: Negative for agitation  _____________________________________________________  PHYSICAL EXAMINATION:    Blood pressure 106/73, pulse (!) 114, height 5' 6" (1 676 m), weight 88 5 kg (195 lb)  Constitutional: Oriented to person, place, and time  Appears well-developed and well-nourished  No distress  HENT:   Head: Normocephalic  Eyes: Conjunctivae are normal  Right eye exhibits no discharge  Left eye exhibits no discharge  No scleral icterus  Cardiovascular: Normal rate  Pulmonary/Chest: Effort normal    Neurological: Alert and oriented to person, place, and time  Skin: Skin is warm and dry  No rash noted  Not diaphoretic  No erythema  No pallor  Psychiatric: Normal mood and affect   Behavior is normal  Judgment and thought content normal       MUSCULOSKELETAL EXAMINATION:   Physical Exam  Ortho Exam    Left lower extremity is neurovascularly intact  Toes are pink and mobile  Compartments are soft  Incision is clean, dry and intact  There is no ligament laxity  Range of motion of the knee is from 0 to 70°  Brisk cap refill  Sensation intact  No warmth, erythema or ecchymosis present  Objective:  BP Readings from Last 1 Encounters:   01/25/21 106/73      Wt Readings from Last 1 Encounters:   01/25/21 88 5 kg (195 lb)        BMI:   Estimated body mass index is 31 47 kg/m² as calculated from the following:    Height as of this encounter: 5' 6" (1 676 m)  Weight as of this encounter: 88 5 kg (195 lb)  Radiographs:  _____________________________________________________  STUDIES REVIEWED:  I have personally reviewed pertinent films and reports in PACS  X-rays the patient's left knee show well-seated left total knee replacement  The prosthesis is in good alignment  There are no signs of loosening  There are no fractures or dislocations        Tawny Ramirez PA-C

## 2021-02-01 ENCOUNTER — APPOINTMENT (OUTPATIENT)
Dept: PHYSICAL THERAPY | Facility: CLINIC | Age: 66
End: 2021-02-01
Payer: MEDICARE

## 2021-02-02 ENCOUNTER — PATIENT OUTREACH (OUTPATIENT)
Dept: CASE MANAGEMENT | Facility: OTHER | Age: 66
End: 2021-02-02

## 2021-02-02 ENCOUNTER — APPOINTMENT (OUTPATIENT)
Dept: PHYSICAL THERAPY | Facility: CLINIC | Age: 66
End: 2021-02-02
Payer: MEDICARE

## 2021-02-02 NOTE — PROGRESS NOTES
Chart review reveals that patient had a 2 week follow up appointment with orthopedics on 1/25/21  X-rays were also taken and showed good alignment of prosthetic  Patient will see outpatient PT this afternoon (2/2/21) for his first visit  Patient has a follow up with PCP on 2/24/21 and a 6 week follow-up with orthopedics on 2/25/21  Chart review will continue until episode end

## 2021-02-03 ENCOUNTER — EVALUATION (OUTPATIENT)
Dept: PHYSICAL THERAPY | Facility: CLINIC | Age: 66
End: 2021-02-03
Payer: MEDICARE

## 2021-02-03 DIAGNOSIS — M17.12 OSTEOARTHRITIS OF LEFT KNEE, UNSPECIFIED OSTEOARTHRITIS TYPE: Primary | ICD-10-CM

## 2021-02-03 DIAGNOSIS — Z96.652 S/P TKR (TOTAL KNEE REPLACEMENT) USING CEMENT, LEFT: ICD-10-CM

## 2021-02-03 PROCEDURE — 97110 THERAPEUTIC EXERCISES: CPT | Performed by: PHYSICAL THERAPIST

## 2021-02-03 PROCEDURE — 97164 PT RE-EVAL EST PLAN CARE: CPT | Performed by: PHYSICAL THERAPIST

## 2021-02-03 PROCEDURE — 97140 MANUAL THERAPY 1/> REGIONS: CPT | Performed by: PHYSICAL THERAPIST

## 2021-02-03 PROCEDURE — 97014 ELECTRIC STIMULATION THERAPY: CPT | Performed by: PHYSICAL THERAPIST

## 2021-02-03 NOTE — PROGRESS NOTES
PT Re-Evaluation     Today's date: 2/3/2021  Patient name: Liliana Feliciano  : 1955  MRN: [id number]  Referring provider: Shilpa Thayer DO  Dx:   Encounter Diagnosis     ICD-10-CM    1  Osteoarthritis of left knee, unspecified osteoarthritis type  M17 12    2  S/P TKR (total knee replacement) using cement, left  [id number]                   Assessment  Assessment details: Pt is a 77year old male presenting for a post-op evaluation s/p L TKR on 2021  Pt presents 3 weeks post op L TKR ambulating with RW with decreased weightbearing L LE and decreased step length  Pt reports increased stiffness with minimal pain only taking ibuprofen 3x a day  Pt presents with minimal to no swelling with incision clean and healing with staples removed and no drainage present  Pt with increased pain with end-range flexion and extension with limited ROM both actively and passively  Pt with decreased L hip and knee strength with limited ability to perform prolonged standing walking and inability to perform (I) stair negotiation  Pt is in need of Outpatient PT to decrease pain and stiffness, increase L knee ROM L LE strength normalize gait and return to prior level of function  Impairments: abnormal gait, abnormal or restricted ROM, activity intolerance, impaired physical strength, lacks appropriate home exercise program, pain with function and weight-bearing intolerance  Functional limitations: prolonged ambulation, prolonged standing, kneeling, stair negotiationUnderstanding of Dx/Px/POC: good   Prognosis: good    Goals  STG's to be met in 3-4 weeks  1  Decrease pain to 1/10 at worst with activity-not met  2  Improve L knee AROM to at least 100 deg flex- not met  3  Improve L hip knee and ankle strength to within 8 lbs of R- not met  4  Improve standing/walking tolerance to 30 minutes -not met  5  Pt will sleep through night without waking due to pain- not met      LTG's to be met in 7-8 weeks  1   Decrease pain to 0-2/10 at worst with activity- not met  2  Improve ROM to at least 115 degrees flex, 0 degrees extension- not met  3  Improve L hip knee and ankle strength to within 3-5 lbs of R- not met  4  Improve step negotiation to reciprocal no LOB or instability- not met  5  Improve standing and walking tolerance to at least 1 hour no A D - not met  6  Independent with HEP- ongoing and progressing  7  Increase Foto score to at least 60       Plan  Patient would benefit from: skilled physical therapy  Planned modality interventions: cryotherapy and unattended electrical stimulation  Other planned modality interventions: modalities to be added or modified as needed  Planned therapy interventions: manual therapy, joint mobilization, neuromuscular re-education, patient education, strengthening, therapeutic exercise, therapeutic activities, flexibility, home exercise program, gait training and stretching  Frequency: 3x week (pt to be re-evaluated post-op)  Duration in weeks: 8  Plan of Care beginning date: 2/3/2021  Plan of Care expiration date: 2021  Treatment plan discussed with: patient and PTA        Subjective Evaluation    History of Present Illness  Mechanism of injury: Pt had L TKR on 2021 and was discharged from hospital on 2021  Pt was to have home health care however only had evaluation  Pt reports he has been doing home exercise program but notes he has stiffness L knee  Pt has been using RW and crutches for ambulation and negotiating stairs with step to pattern             Not a recurrent problem   Quality of life: good    Pain  Current pain ratin  At best pain ratin  At worst pain ratin (since injection, prior 9/10)  Location: L knee  Quality: sharp and throbbing  Relieving factors: medications    Treatments  Previous treatment: injection treatment  Current treatment: physical therapy  Patient Goals  Patient goals for therapy: decreased pain, increased motion, increased strength, return to sport/leisure activities and independence with ADLs/IADLs  Patient goal: returning to [employer], [employer], and fishing        Objective     Observations     Additional Observation Details  Gait is with RW with limp and decreased step length with decreased L knee flexion    Neurological Testing     Sensation     Knee   Left Knee   Diminished: light touch     Right Knee   Intact: light touch     Comments   Left light touch: lateral ankle, medial and lateral knee decreased to light touch       Active Range of Motion   Left Knee   Flexion: 81 degrees   Extension: -10 degrees     Passive Range of Motion   Left Knee   Flexion: 86 degrees   Extension: -6 degrees     Strength/Myotome Testing     Additional Strength Details                    R                  L  Hip       Flex      40 lbs           28 lbs      Abd       41 lbs           31 lbs      Add       41 lbs           22 lbs  Knee      Ext        41 lbs           24 lbs     Flex        55 lbs           30 lbs  Ankle       DF        20 lbs           16 lbs       PF        53 lbs            35 lbs    General Comments:      Knee Comments  Girth: Suprapatella- 39 cm            Mid joint        -38 7 cm    Standing/walking tolerance 45 minutes- post op standing/walking 5 minutes  Ambulation tolerance less than 1 block- Ambulation tolerance with RW 1 block  Stair negotiation is with step to pattern- Stair negotiation remains at step to pattern  Pt unable to kneel on L knee- pt remains unable to kneel on L knee  Foto-45               Precautions: pre-op visit for L TKR 1/13/2020      Date: 12/21/20 2/3/21      Visit: 1 2      Pain 1/10 L knee 1/10 L knee      Manuals        jt mobilization L knee/patella  JH      L knee ROM flex/ext  JH                      Neuro Re-Ed        SLS        Tandem stance        Toe-raises        Step-ups        Standing hip abd        Standing marching                Ther Ex        Supine hip abduction x10 x30      Ankle pumps x10 home      Glut sets x10       Quad sets x10 x30 3"      SLR's  2/10      Heel slides x10 L4 x30      SAQ's  x30      LAQ's x10       Adductor squeezes  x30 3"              Nu-step  ------->      Ther Activity                        Gait Training                        Modalities        CP w/ IFC L knee  15 min

## 2021-02-03 NOTE — LETTER
February 3, 2021    Migue Barth, [address]  [address] [facility] [place], [place]    Patient: Rea Puri   YOB: 1955   Date of Visit: 2/3/2021     Encounter Diagnosis     ICD-10-CM    1  Osteoarthritis of left knee, unspecified osteoarthritis type  M17 12    2  S/P TKR (total knee replacement) using cement, left  [id number]        Dear Dr Hernandez Nim:    Thank you for your recent referral of Rea Puri  Please review the attached evaluation summary from Gene's recent visit  Please verify that you agree with the plan of care by signing the attached order  If you have any questions or concerns, please do not hesitate to call  I sincerely appreciate the opportunity to share in the care of one of your patients and hope to have another opportunity to work with you in the near future  Sincerely,    Ximena Mccann, PT      Referring Provider:      I certify that I have read the below Plan of Care and certify the need for these services furnished under this plan of treatment while under my care  Migue Barth DO  [address]  [place]          PT Re-Evaluation     Today's date: 2/3/2021  Patient name: Rea Puri  : 1955  MRN: [id number]  Referring provider: Elias Wright DO  Dx:   Encounter Diagnosis     ICD-10-CM    1  Osteoarthritis of left knee, unspecified osteoarthritis type  M17 12    2  S/P TKR (total knee replacement) using cement, left  [id number]                   Assessment  Assessment details: Pt is a 77year old male presenting for a post-op evaluation s/p L TKR on 2021  Pt presents 3 weeks post op L TKR ambulating with RW with decreased weightbearing L LE and decreased step length  Pt reports increased stiffness with minimal pain only taking ibuprofen 3x a day  Pt presents with minimal to no swelling with incision clean and healing with staples removed and no drainage present   Pt with increased pain with end-range flexion and extension with limited ROM both actively and passively  Pt with decreased L hip and knee strength with limited ability to perform prolonged standing walking and inability to perform (I) stair negotiation  Pt is in need of Outpatient PT to decrease pain and stiffness, increase L knee ROM L LE strength normalize gait and return to prior level of function  Impairments: abnormal gait, abnormal or restricted ROM, activity intolerance, impaired physical strength, lacks appropriate home exercise program, pain with function and weight-bearing intolerance  Functional limitations: prolonged ambulation, prolonged standing, kneeling, stair negotiationUnderstanding of Dx/Px/POC: good   Prognosis: good    Goals  STG's to be met in 3-4 weeks  1  Decrease pain to 1/10 at worst with activity-not met  2  Improve L knee AROM to at least 100 deg flex- not met  3  Improve L hip knee and ankle strength to within 8 lbs of R- not met  4  Improve standing/walking tolerance to 30 minutes -not met  5  Pt will sleep through night without waking due to pain- not met      LTG's to be met in 7-8 weeks  1  Decrease pain to 0-2/10 at worst with activity- not met  2  Improve ROM to at least 115 degrees flex, 0 degrees extension- not met  3  Improve L hip knee and ankle strength to within 3-5 lbs of R- not met  4  Improve step negotiation to reciprocal no LOB or instability- not met  5  Improve standing and walking tolerance to at least 1 hour no A D - not met  6  Independent with HEP- ongoing and progressing  7   Increase Foto score to at least 60       Plan  Patient would benefit from: skilled physical therapy  Planned modality interventions: cryotherapy and unattended electrical stimulation  Other planned modality interventions: modalities to be added or modified as needed  Planned therapy interventions: manual therapy, joint mobilization, neuromuscular re-education, patient education, strengthening, therapeutic exercise, therapeutic activities, flexibility, home exercise program, gait training and stretching  Frequency: 3x week (pt to be re-evaluated post-op)  Duration in weeks: 8  Plan of Care beginning date: 2/3/2021  Plan of Care expiration date: 2021  Treatment plan discussed with: patient and PTA        Subjective Evaluation    History of Present Illness  Mechanism of injury: Pt had L TKR on 2021 and was discharged from hospital on 2021  Pt was to have home health care however only had evaluation  Pt reports he has been doing home exercise program but notes he has stiffness L knee  Pt has been using RW and crutches for ambulation and negotiating stairs with step to pattern  Not a recurrent problem   Quality of life: good    Pain  Current pain ratin  At best pain ratin  At worst pain ratin (since injection, prior 9/10)  Location: L knee  Quality: sharp and throbbing  Relieving factors: medications    Treatments  Previous treatment: injection treatment  Current treatment: physical therapy  Patient Goals  Patient goals for therapy: decreased pain, increased motion, increased strength, return to sport/leisure activities and independence with ADLs/IADLs  Patient goal: returning to golfing, hunting, and fishing        Objective     Observations     Additional Observation Details  Gait is with RW with limp and decreased step length with decreased L knee flexion    Neurological Testing     Sensation     Knee   Left Knee   Diminished: light touch     Right Knee   Intact: light touch     Comments   Left light touch: lateral ankle, medial and lateral knee decreased to light touch       Active Range of Motion   Left Knee   Flexion: 81 degrees   Extension: -10 degrees     Passive Range of Motion   Left Knee   Flexion: 86 degrees   Extension: -6 degrees     Strength/Myotome Testing     Additional Strength Details                    R                  L  Hip       Flex      40 lbs 28 lbs      Abd       41 lbs           31 lbs      Add       41 lbs           22 lbs  Knee      Ext        41 lbs           24 lbs     Flex        55 lbs           30 lbs  Ankle       DF        20 lbs           16 lbs       PF        53 lbs            35 lbs    General Comments:      Knee Comments  Girth: Suprapatella- 39 cm            Mid joint        -38 7 cm    Standing/walking tolerance 45 minutes- post op standing/walking 5 minutes  Ambulation tolerance less than 1 block- Ambulation tolerance with RW 1 block  Stair negotiation is with step to pattern- Stair negotiation remains at step to pattern  Pt unable to kneel on L knee- pt remains unable to kneel on L knee  [id number]               Precautions: pre-op visit for L TKR 1/13/2020      Date: 12/21/20 2/3/21      Visit: 1 2      Pain 1/10 L knee 1/10 L knee      Manuals        jt mobilization L knee/patella  JH      L knee ROM flex/ext  JH                      Neuro Re-Ed        SLS        Tandem stance        Toe-raises        Step-ups        Standing hip abd        Standing marching                Ther Ex        Supine hip abduction x10 x30      Ankle pumps x10 home      Glut sets x10       Quad sets x10 x30 3"      SLR's  2/10      Heel slides x10 L4 x30      SAQ's  x30      LAQ's x10       Adductor squeezes  x30 3"              Nu-step  ------->      Ther Activity                        Gait Training                        Modalities        CP w/ IFC L knee  15 min

## 2021-02-04 ENCOUNTER — OFFICE VISIT (OUTPATIENT)
Dept: PHYSICAL THERAPY | Facility: CLINIC | Age: 66
End: 2021-02-04
Payer: MEDICARE

## 2021-02-04 DIAGNOSIS — Z96.652 S/P TKR (TOTAL KNEE REPLACEMENT) USING CEMENT, LEFT: ICD-10-CM

## 2021-02-04 DIAGNOSIS — M17.12 OSTEOARTHRITIS OF LEFT KNEE, UNSPECIFIED OSTEOARTHRITIS TYPE: Primary | ICD-10-CM

## 2021-02-04 PROCEDURE — 97014 ELECTRIC STIMULATION THERAPY: CPT

## 2021-02-04 PROCEDURE — 97140 MANUAL THERAPY 1/> REGIONS: CPT

## 2021-02-04 PROCEDURE — 97110 THERAPEUTIC EXERCISES: CPT

## 2021-02-04 NOTE — PROGRESS NOTES
Daily Note     Today's date: 2021  Patient name: Jericho Mcmanus  : 1955  MRN: [id number]  Referring provider: Don Cruz DO  Dx:   Encounter Diagnosis     ICD-10-CM    1  Osteoarthritis of left knee, unspecified osteoarthritis type  M17 12    2  S/P TKR (total knee replacement) using cement, left  [id number]        Start Time: 1230  Stop Time: 1320  Total time in clinic (min): 50 minutes    Subjective: Patient responded well to last session, some soreness stated post treat  3/10 pain reported at this time  Pain more in medial L knee  Objective: PTA directed patient in TE program, with trial of Nustep, and administered IFC/CP post treat, See treatment diary below  Assessment: Tolerated treatment fair  Patient demonstrated fatigue post treatment, increased pain at end range with L knee flexion  Patient noted a good stretch in L knee with added Nustep  Patient demonstrated decreased tolerance with exercises  Plan: Continue per plan of care        Precautions: pre-op visit for L TKR 2020      Date: 12/21/20 2/3/21 2/4     Visit: 1 2 3     Pain 1/10 L knee 1/10 L knee 3/10     Manuals        jt mobilization L knee/patella  JH      L knee ROM flex/ext  [facility] JADEN KW                     Neuro Re-Ed        SLS        Tandem stance        Toe-raises        Step-ups        Standing hip abd        Standing marching                Ther Ex        Supine hip abduction x10 x30 30x     Ankle pumps x10 home      Glut sets x10       Quad sets x10 x30 3" 30x 3"     SLR's  /10 2/10     Heel slides x10 L4 x30 L4 30x     SAQ's  x30 30x     LAQ's x10  30x     Adductor squeezes  x30 3" 30x 3"             Nu-step  -------> 4m L1      Ther Activity                        Gait Training                        Modalities        CP w/ IFC L knee  15 min 15 min

## 2021-02-08 ENCOUNTER — OFFICE VISIT (OUTPATIENT)
Dept: PHYSICAL THERAPY | Facility: CLINIC | Age: 66
End: 2021-02-08
Payer: MEDICARE

## 2021-02-08 DIAGNOSIS — M17.12 OSTEOARTHRITIS OF LEFT KNEE, UNSPECIFIED OSTEOARTHRITIS TYPE: Primary | ICD-10-CM

## 2021-02-08 DIAGNOSIS — Z96.652 S/P TKR (TOTAL KNEE REPLACEMENT) USING CEMENT, LEFT: ICD-10-CM

## 2021-02-08 PROCEDURE — 97112 NEUROMUSCULAR REEDUCATION: CPT

## 2021-02-08 PROCEDURE — 97140 MANUAL THERAPY 1/> REGIONS: CPT

## 2021-02-08 PROCEDURE — 97110 THERAPEUTIC EXERCISES: CPT

## 2021-02-08 PROCEDURE — 97014 ELECTRIC STIMULATION THERAPY: CPT

## 2021-02-08 NOTE — PROGRESS NOTES
Daily Note     Today's date: 2021  Patient name: Mayito Dorsey  : 1955  MRN: [id number]  Referring provider: Marysol Arevalo DO  Dx:   Encounter Diagnosis     ICD-10-CM    1  Osteoarthritis of left knee, unspecified osteoarthritis type  M17 12    2  S/P TKR (total knee replacement) using cement, left  [id number]        Start Time: 1300  Stop Time: 1400  Total time in clinic (min): 60 minutes    Subjective: Patient stated that he has difficulty sleeping, as he cannot get comfortable  Objective: PTA directed patient in TE program, adding balance drills this session, See treatment diary below  Assessment: Tolerated treatment fair  Patient noted an increase in pain with exercises, 4/10  No trouble or difficulty with added TE  Plan: Continue per plan of care        Precautions: pre-op visit for L TKR 2020      Date: 12/21/20 2/3/21 2/4 2/8    Visit: 1 2 3 4    Pain /10 L knee 10 L knee 3/10 2/10    Manuals        jt mobilization L knee/patella  JH      L knee ROM flex/ext  [facility] JADEN KW KW                    Neuro Re-Ed        SLS    2x 15" L LE only     Tandem stance        Toe-raises    unable    Step-ups        Standing hip abd    2/10    Standing marching    20x each             Ther Ex        Supine hip abduction x10 x30 30x L3 30x     Ankle pumps x10 home      Glut sets x10       Quad sets x10 x30 3" 30x 3" 30x 3"    SLR's  2/10 2/10 2/10    Heel slides x10 L4 x30 L4 30x L4 30x     SAQ's  x30 30x 30x    LAQ's x10  30x 30x     Adductor squeezes  x30 3" 30x 3" 30x 3"            Nu-step  -------> 4m L1  4m L2    Ther Activity                        Gait Training                        Modalities        CP w/ IFC L knee  15 min 15 min  15 min

## 2021-02-10 ENCOUNTER — OFFICE VISIT (OUTPATIENT)
Dept: PHYSICAL THERAPY | Facility: CLINIC | Age: 66
End: 2021-02-10
Payer: MEDICARE

## 2021-02-10 DIAGNOSIS — M17.12 OSTEOARTHRITIS OF LEFT KNEE, UNSPECIFIED OSTEOARTHRITIS TYPE: Primary | ICD-10-CM

## 2021-02-10 DIAGNOSIS — Z96.652 S/P TKR (TOTAL KNEE REPLACEMENT) USING CEMENT, LEFT: ICD-10-CM

## 2021-02-10 PROCEDURE — 97014 ELECTRIC STIMULATION THERAPY: CPT

## 2021-02-10 PROCEDURE — 97112 NEUROMUSCULAR REEDUCATION: CPT

## 2021-02-10 PROCEDURE — 97140 MANUAL THERAPY 1/> REGIONS: CPT

## 2021-02-10 PROCEDURE — 97110 THERAPEUTIC EXERCISES: CPT

## 2021-02-10 NOTE — PROGRESS NOTES
Daily Note     Today's date: 2/10/2021  Patient name: Melba Suarez  : 1955  MRN: [id number]  Referring provider: Kaylee Hanson DO  Dx:   Encounter Diagnosis     ICD-10-CM    1  Osteoarthritis of left knee, unspecified osteoarthritis type  M17 12    2  S/P TKR (total knee replacement) using cement, left  [id number]        Start Time: 1300  Stop Time: 1400  Total time in clinic (min): 60 minutes    Subjective: Pt states he has less pain overall  He arrives to the clinic with his rolling walker  Objective: See treatment diary below,  D/C heel raise due to ankle injury  Added hamstring curl today  Assessment: Tolerated treatment well  Patient exhibited good technique with therapeutic exercises      Plan: Continue per plan of care        Precautions: pre-op visit for L TKR 2020      Date: 12/21/20 2/3/21 2/4 2/8 2/10   Visit: 1 2 3 4 5   Pain 1/10 L knee 1/10 L knee 3/10 2/10 1-2   Manuals        jt mobilization L knee/patella     ds   L knee ROM flex/ext  [facility] JADEN KW KW ds   Neuro Re-Ed        SLS    2x 15" L LE only  3x 15"   Tandem stance        Toe-raises    unable DC   Step-ups        Standing hip abd    2/10 2/10   Standing marching    20x each     Ther Ex        Supine hip abduction x10 x30 30x L3 30x  L4 3/10   Quad sets x10 x30 3" 30x 3" 30x 3" 30x 3"   SLR's  2/10 2/10 2/10 2/10   Heel slides x10 L4 x30 L4 30x L4 30x  L4 2/15   SAQ's  x30 30x 30x 30x   LAQ's x10  30x 30x  30x   Adductor squeezes  x30 3" 30x 3" 30x 3" 30x 3"   Hamstring Curl     L4 210   Nu-step  -------> 4m L1  4m L2 8m L2   Ther Activity                Gait Training                Modalities        CP w/ IFC L knee  15 min 15 min  15 min  15m

## 2021-02-12 ENCOUNTER — OFFICE VISIT (OUTPATIENT)
Dept: PHYSICAL THERAPY | Facility: CLINIC | Age: 66
End: 2021-02-12
Payer: MEDICARE

## 2021-02-12 DIAGNOSIS — Z96.652 S/P TKR (TOTAL KNEE REPLACEMENT) USING CEMENT, LEFT: ICD-10-CM

## 2021-02-12 DIAGNOSIS — M17.12 OSTEOARTHRITIS OF LEFT KNEE, UNSPECIFIED OSTEOARTHRITIS TYPE: Primary | ICD-10-CM

## 2021-02-12 PROCEDURE — 97014 ELECTRIC STIMULATION THERAPY: CPT | Performed by: PHYSICAL THERAPIST

## 2021-02-12 PROCEDURE — 97112 NEUROMUSCULAR REEDUCATION: CPT | Performed by: PHYSICAL THERAPIST

## 2021-02-12 PROCEDURE — 97110 THERAPEUTIC EXERCISES: CPT | Performed by: PHYSICAL THERAPIST

## 2021-02-12 PROCEDURE — 97140 MANUAL THERAPY 1/> REGIONS: CPT | Performed by: PHYSICAL THERAPIST

## 2021-02-12 NOTE — PROGRESS NOTES
Daily Note     Today's date: 2021  Patient name: Brendan Mullins  : 1955  MRN: [id number]  Referring provider: Matthew Morales DO  Dx:   Encounter Diagnosis     ICD-10-CM    1  Osteoarthritis of left knee, unspecified osteoarthritis type  M17 12    2  S/P TKR (total knee replacement) using cement, left  [id number]                   Subjective: Pt reports pain today is 1/10  Pt notes he is ready to progress to crutches for ambulation  Stiffness occurs after prolonged sitting  Objective: See treatment diary below  Ext AROM -4 PROM -3, flex AROM 94 deg   PROM 97 deg  Added 1 5# weight to seated and supine TE per grid      Assessment: Tolerated treatment well  Patient tolerated addition of resistance with TE well but did note fatigue afterward  Pt demonstrating improvement in L knee ROM into flex and ext since beginning PT  Pt demonstrating improved balance and gait with RW and is appropriate for progression to [address] [place] [address] although pt would prefer crutches versus cane  Plan: Continue per plan of care        Precautions: pre-op visit for L TKR 2020      Date: 2/12/21 2/3/21 2/4 2/8 2/10   Visit: 6 2 3 4 5   Pain 1/10 L knee 1/10 L knee 3/10 2/10 1-2   Manuals        jt mobilization L knee/patella [facility] JH   ds   L knee ROM flex/ext [facility] KW KW ds   Neuro Re-Ed        SLS 3 x 15"   2x 15" L LE only  3x 15"   Tandem stance            unable DC   Step-ups        Standing hip abd 210   10 2/10   Standing marching x20   20x each     Ther Ex        Supine hip abduction L4 3/10 x30 30x L3 30x  L4 3/10   Quad sets x30 3" x30 3" 30x 3" 30x 3" 30x 3"   SLR's 1 5# 3/10 2/10 2/10 2/10 2/10   Heel slides L4 x30 L4 x30 L4 30x L4 30x  L4 15   SAQ's 1 5# 3/10 x30 30x 30x 30x   LAQ's 1 5# 4/10  30x 30x  30x   Adductor squeezes x30 3" x30 3" 30x 3" 30x 3" 30x 3"   Hamstring Curl L4 2/10    L4 2/10   Nu-step 10 min -------> 4m L1  4m L2 8m L2   Ther Activity                Gait Training                Modalities CP w/ IFC L knee 15 min 15 min 15 min  15 min  15m

## 2021-02-15 ENCOUNTER — OFFICE VISIT (OUTPATIENT)
Dept: PHYSICAL THERAPY | Facility: CLINIC | Age: 66
End: 2021-02-15
Payer: MEDICARE

## 2021-02-15 DIAGNOSIS — M17.12 OSTEOARTHRITIS OF LEFT KNEE, UNSPECIFIED OSTEOARTHRITIS TYPE: Primary | ICD-10-CM

## 2021-02-15 DIAGNOSIS — Z96.652 S/P TKR (TOTAL KNEE REPLACEMENT) USING CEMENT, LEFT: ICD-10-CM

## 2021-02-15 PROCEDURE — 97112 NEUROMUSCULAR REEDUCATION: CPT

## 2021-02-15 PROCEDURE — 97014 ELECTRIC STIMULATION THERAPY: CPT

## 2021-02-15 PROCEDURE — 97140 MANUAL THERAPY 1/> REGIONS: CPT

## 2021-02-15 PROCEDURE — 97110 THERAPEUTIC EXERCISES: CPT

## 2021-02-17 ENCOUNTER — OFFICE VISIT (OUTPATIENT)
Dept: PHYSICAL THERAPY | Facility: CLINIC | Age: 66
End: 2021-02-17
Payer: MEDICARE

## 2021-02-17 DIAGNOSIS — M17.12 OSTEOARTHRITIS OF LEFT KNEE, UNSPECIFIED OSTEOARTHRITIS TYPE: Primary | ICD-10-CM

## 2021-02-17 DIAGNOSIS — Z96.652 S/P TKR (TOTAL KNEE REPLACEMENT) USING CEMENT, LEFT: ICD-10-CM

## 2021-02-17 PROCEDURE — 97140 MANUAL THERAPY 1/> REGIONS: CPT

## 2021-02-17 PROCEDURE — 97110 THERAPEUTIC EXERCISES: CPT

## 2021-02-17 PROCEDURE — 97112 NEUROMUSCULAR REEDUCATION: CPT

## 2021-02-17 NOTE — PROGRESS NOTES
Daily Note     Today's date: 2021  Patient name: Eliazar Valle  : 1955  MRN: [id number]  Referring provider: Karuna Rock DO  Dx:   Encounter Diagnosis     ICD-10-CM    1  Osteoarthritis of left knee, unspecified osteoarthritis type  M17 12    2  S/P TKR (total knee replacement) using cement, left  [id number] [id number]        Start Time: 1300  Stop Time: 1400  Total time in clinic (min): 60 minutes    Subjective: Pt's cc is L knee stiffness  0Objective: See treatment diary below  Discontinue B gastroc stretch  Due to non related issues  Assessment: Tolerated treatment well  Patient exhibited good technique with therapeutic exercises  Held estim at pt's request       Plan: Continue per plan of care        Precautions: pre-op visit for L TKR 2020      Date: 2/12/21 2/3/21 2/15 2/17 2/10   Visit: 6 7 8 9 5   Pain 1/10 L knee 1/10 L knee 0 0-1 stiff 1-2   Manuals        jt mobilization L knee/patella [facility] ds   L knee ROM flex/ext [facility] ds ds   Neuro Re-Ed        SLS 3 x 15"  3x 15" 3x 15" 3x 15"   Tandem stance        Step-ups        Standing hip abd 2/10  2/10 2/10 2/10   Standing marching x20  20x  2/20 2/20   Ther Ex        Supine hip abduction L4 3/10 x30 L4 30x  L4 15x L4 3/10   Quad sets x30 3" x30 3" 30x 3" 30x 3" 30x 3"   SLR's 1 5# 3/10 2/10 1 5# 2/10 1 5# 2/15 2/10   Heel slides L4 x30 L4 x30 30x with OP L4 30x  L4 2/15   SAQ's 1 5# 3/10 x30 1 5# 3/10 1 5# 2/15 30x   LAQ's 1 5# 4/10  1 5# 4/10 1 5# 2/15 30x   Adductor squeezes x30 3" x30 3" 30x 3" 30x 3" 30x 3"   Hamstring Curl L4 2/10  L4 2/10 L4 2/15 L4 2/10   Nu-step 10 min -------> 10 min L3 12m L4 8m L2   Ther Activity                Gait Training                Modalities        CP w/ IFC L knee 15 min 15 min 15 min  15 m 15m

## 2021-02-19 ENCOUNTER — OFFICE VISIT (OUTPATIENT)
Dept: PHYSICAL THERAPY | Facility: CLINIC | Age: 66
End: 2021-02-19
Payer: MEDICARE

## 2021-02-19 DIAGNOSIS — Z96.652 S/P TKR (TOTAL KNEE REPLACEMENT) USING CEMENT, LEFT: ICD-10-CM

## 2021-02-19 DIAGNOSIS — M17.12 OSTEOARTHRITIS OF LEFT KNEE, UNSPECIFIED OSTEOARTHRITIS TYPE: Primary | ICD-10-CM

## 2021-02-19 PROCEDURE — 97010 HOT OR COLD PACKS THERAPY: CPT

## 2021-02-19 PROCEDURE — 97140 MANUAL THERAPY 1/> REGIONS: CPT

## 2021-02-19 PROCEDURE — 97110 THERAPEUTIC EXERCISES: CPT

## 2021-02-19 PROCEDURE — 97112 NEUROMUSCULAR REEDUCATION: CPT

## 2021-02-19 NOTE — PROGRESS NOTES
Daily Note     Today's date: 2021  Patient name: Heriberto Santoyo  : 1955  MRN: [id number]  Referring provider: Fe Henderson DO  Dx:   Encounter Diagnosis     ICD-10-CM    1  Osteoarthritis of left knee, unspecified osteoarthritis type  M17 12    2  S/P TKR (total knee replacement) using cement, left  [id number] [id number]        Start Time: 1000  Stop Time: 1130  Total time in clinic (min): 90 minutes    Subjective:Pt comments on needing the day in between PT sessions  He requested to defer E-stim again today  Objective: See treatment diary below  Modified SLR to include 2 hangs per 5 SLRx 4 reps  Assessment: Tolerated treatment well  Patient exhibited good technique with therapeutic exercises      Plan: Continue per plan of care        Precautions: pre-op visit for L TKR 2020      Date: 2/12/21 2/3/21 2/15 2/17 2/18   Visit: 6 7 8 9 10   Pain 1/10 L knee 1/10 L knee 0 0-1 stiff 1-2   Manuals        jt mobilization L knee/patella [facility]  ds ds patella   L knee ROM flex/ext [facility] KW ds ds   Neuro Re-Ed        SLS 3 x 15"  3x 15" 3x 15" 3x 15" mat   Tandem stance        Step-ups        Standing hip abd 2/10  2/10 2/10 2/10   Standing marching x20  20x  2/20 2/20   Ther Ex        Supine hip abduction L4 3/10 x30 L4 30x  L4 15x L5 2/15   Quad sets x30 3" x30 3" 30x 3" 30x 3" 30x 3"   SLR's c hang* 1 5# 3/10 2/10 1 5# 2/10 1 5# 2/15 1 5# 4/5 *   Heel slides L4 x30 L4 x30 30x with OP L4 30x  L5 2/15   SAQ's 1 5# 3/10 x30 1 5# 3/10 1 5# 2/15 1 5# 2/15   LAQ's 1 5# 4/10  1 5# 4/10 1 5# 2/15 1 5# 2/15   Adductor squeezes x30 3" x30 3" 30x 3" 30x 3" 30x 3"   Hamstring Curl L4 2/10  L4 2/10 L4 2/15 L5 2/15   Nu-step 10 min -------> 10 min L3 12m L4 10m L5   Ther Activity                Gait Training                Modalities        CP w/ IFC L knee 15 min 15 min 15 min  15 m 15m CP

## 2021-02-22 ENCOUNTER — APPOINTMENT (OUTPATIENT)
Dept: PHYSICAL THERAPY | Facility: CLINIC | Age: 66
End: 2021-02-22
Payer: MEDICARE

## 2021-02-23 ENCOUNTER — EVALUATION (OUTPATIENT)
Dept: PHYSICAL THERAPY | Facility: CLINIC | Age: 66
End: 2021-02-23
Payer: MEDICARE

## 2021-02-23 DIAGNOSIS — M17.12 OSTEOARTHRITIS OF LEFT KNEE, UNSPECIFIED OSTEOARTHRITIS TYPE: Primary | ICD-10-CM

## 2021-02-23 DIAGNOSIS — Z96.652 S/P TKR (TOTAL KNEE REPLACEMENT) USING CEMENT, LEFT: ICD-10-CM

## 2021-02-23 PROCEDURE — 97140 MANUAL THERAPY 1/> REGIONS: CPT | Performed by: PHYSICAL THERAPIST

## 2021-02-23 PROCEDURE — 97112 NEUROMUSCULAR REEDUCATION: CPT | Performed by: PHYSICAL THERAPIST

## 2021-02-23 PROCEDURE — 97110 THERAPEUTIC EXERCISES: CPT | Performed by: PHYSICAL THERAPIST

## 2021-02-23 NOTE — LETTER
2021    Trinity Garvin, [address]    Patient: Heriberto Santoyo   YOB: 1955   Date of Visit: 2021     Encounter Diagnosis     ICD-10-CM    1  Osteoarthritis of left knee, unspecified osteoarthritis type  M17 12    2  S/P TKR (total knee replacement) using cement, left  [id number]        Dear Dr Matthieu Mortensen:    Thank you for your recent referral of Heriberto Santoyo  Please review the attached evaluation summary from Gene's recent visit  Please verify that you agree with the plan of care by signing the attached order  If you have any questions or concerns, please do not hesitate to call  I sincerely appreciate the opportunity to share in the care of one of your patients and hope to have another opportunity to work with you in the near future  Sincerely,    Aakash Greenwood, PT      Referring Provider:      I certify that I have read the below Plan of Care and certify the need for these services furnished under this plan of treatment while under my care  Trinity Garvin DO  [address] [place] [address]          PT Re-Evaluation     Today's date: 2021  Patient name: Heriberto Santoyo  : 1955  MRN: [id number]  Referring provider: Fe Henderson DO  Dx:   Encounter Diagnosis     ICD-10-CM    1  Osteoarthritis of left knee, unspecified osteoarthritis type  M17 12    2  S/P TKR (total knee replacement) using cement, left  [id number]                   Assessment  Assessment details: Pt is a 77year old male presenting for a post-op evaluation s/p L TKR on 2021  Pt has been seen for 11 visits of Outpatient PT with treatment consisting manual stretching patella and joint mobilization, ROM, stretching strengthening balance proprioception and conditioning exercises followed by CP with IFC to the left knee   Pt is making excellent progress with therapy with decrease in pain at L knee, improvement in flex and ext ROM, strength throughout L hip knee and ankle, and improvement in tolerance for all activity  Pt has been able to progress to ambulation with [facility] for community ambulation and no A D  at home  Pt has been able to progress to reciprocal ambulation x 7 steps then step to pattern  Pt still with ROM deficit into flexion and extension, strength deficit at L quad and hamstrings with limitation in prolonged weightbearing and ambulation therefore would benefit from continued activity in PT to restore functional ROM and strength with return to all ADL's and IADL's unrestricted  Impairments: abnormal gait, abnormal or restricted ROM, activity intolerance, impaired physical strength, pain with function and weight-bearing intolerance  Functional limitations: prolonged ambulation, prolonged standing, kneeling, stair negotiationUnderstanding of Dx/Px/POC: good   Prognosis: good    Goals  STG's to be met in 3-4 weeks  1  Decrease pain to 1/10 at worst with activity- partially met  2  Improve L knee AROM to at least 100 deg flex-  met  3  Improve L hip knee and ankle strength to within 8 lbs of R- partially met  4  Improve standing/walking tolerance to 30 minutes - met  5  Pt will sleep through night without waking due to pain-  met      LTG's to be met in 7-8 weeks  1  Decrease pain to 0-2/10 at worst with activity- met  2  Improve ROM to at least 115 degrees flex, 0 degrees extension- not met  3  Improve L hip knee and ankle strength to within 3-5 lbs of R- partially met  4  Improve step negotiation to reciprocal no LOB or instability- progressing not met  5  Improve standing and walking tolerance to at least 1 hour no A D - not met  6  Independent with HEP- ongoing and progressing  7   Increase Foto score to at least 60 -met      Plan  Patient would benefit from: skilled physical therapy  Planned modality interventions: cryotherapy and unattended electrical stimulation  Other planned modality interventions: modalities to be added or modified as needed  Planned therapy interventions: manual therapy, joint mobilization, neuromuscular re-education, patient education, strengthening, therapeutic exercise, therapeutic activities, flexibility, home exercise program, gait training and stretching  Frequency: 3x week (pt to be re-evaluated post-op)  Duration in weeks: 4  Plan of Care beginning date: 2021  Plan of Care expiration date: 3/25/2021  Treatment plan discussed with: patient and PTA        Subjective Evaluation    History of Present Illness  Mechanism of injury: Pt reports pain is now abolished and he only notes stiffness  Pt reports he progressed to cane for community ambulation and no A D  for ambulation in home  Pt reports increased ROM and also is progressing to step to stair negotiation  Not a recurrent problem   Quality of life: good    Pain  Current pain ratin  At best pain ratin  At worst pain ratin  Location: L knee  Quality: stiffness and muscle soreness  Relieving factors: medications    Treatments  Previous treatment: injection treatment  Current treatment: physical therapy  Patient Goals  Patient goals for therapy: decreased pain, increased motion, increased strength, return to sport/leisure activities and independence with ADLs/IADLs  Patient goal: returning to golfing, hunting, and fishing- not met        Objective     Observations     Additional Observation Details  Gait is with RW with limp and decreased step length with decreased L knee flexion- Gait is now with SPC with limp and decreased step length however gait speed has increased    Neurological Testing     Sensation     Knee   Left Knee   Diminished: light touch     Right Knee   Intact: light touch     Comments   Left light touch:  lateral knee and proximal shin decreased to light touch       Active Range of Motion   Left Knee   Flexion: 105 degrees   Extension: -3 degrees     Passive Range of Motion Left Knee   Flexion: 108 degrees   Extension: 0 degrees     Strength/Myotome Testing     Additional Strength Details                    R                  L  Hip       Flex      40 lbs           31 lbs      Abd       41 lbs           46 lbs      Add       41 lbs           41 lbs  Knee      Ext        41 lbs           37 lbs     Flex        55 lbs           41 lbs  Ankle       DF        20 lbs            26 lbs       PF        53 lbs            35 lbs    General Comments:      Knee Comments  Girth: Suprapatella- 40 cm            Mid joint        -39 6 cm    Standing/walking tolerance 45 minutes- post op standing/walking 30 minutes  Ambulation tolerance less than 1 block- Ambulation tolerance with Spc 2-3 blocks  Stair negotiation is with step to pattern- Stair negotiation is step to and reciprocal for 7 steps  Pt unable to kneel on L knee- pt remains unable to kneel on L knee  Foto-45- increased to 67               Precautions: pre-op visit for L TKR 1/13/2020     Date: 2/23/21 2/3/21 2/15 2/17 2/18   Visit: 11 7 8 9 10   Pain 0/10 L knee 1/10 L knee 0 0-1 stiff 1-2   Manuals        jt mobilization L knee/patella [facility]  ds ds patella   L knee ROM flex/ext [facility] KW ds ds   Neuro Re-Ed        SLS 3 x 15"  3x 15" 3x 15" 3x 15" mat   Tandem stance        Step-ups 6" x20       Standing hip abd 3# 2/10  2/10 2/10 2/10   Standing marching 3# x20  20x  2/20 2/20   Ther Ex        Supine hip abduction L5 3/10 x30 L4 30x  L4 15x L5 2/15   Quad sets x30 3" x30 3" 30x 3" 30x 3" 30x 3"   SLR's c hang* 1 5# 3/10 2/10 1 5# 2/10 1 5# 2/15 1 5# 4/5 *   Heel slides L5 x30 L4 x30 30x with OP L4 30x  L5 2/15   SAQ's 1 5# 3/10 x30 1 5# 3/10 1 5# 2/15 1 5# 2/15   LAQ's 1 5# 4/10  1 5# 4/10 1 5# 2/15 1 5# 2/15   Adductor squeezes x30 3" x30 3" 30x 3" 30x 3" 30x 3"   Hamstring Curl L5 2/15  L4 2/10 L4 2/15 L5 2/15   Nu-step 5 min bike -------> 10 min L3 12m L4 10m L5   Ther Activity                Gait Training                Modalities CP w/ IFC L knee Deferred no pain 15 min 15 min  15 m 15m CP

## 2021-02-23 NOTE — PROGRESS NOTES
PT Re-Evaluation     Today's date: 2021  Patient name: Rachel Sidhu  : 1955  MRN: [id number]  Referring provider: Aj Davison DO  Dx:   Encounter Diagnosis     ICD-10-CM    1  Osteoarthritis of left knee, unspecified osteoarthritis type  M17 12    2  S/P TKR (total knee replacement) using cement, left  [id number]                   Assessment  Assessment details: Pt is a 77year old male presenting for a post-op evaluation s/p L TKR on 2021  Pt has been seen for 11 visits of Outpatient PT with treatment consisting manual stretching patella and joint mobilization, ROM, stretching strengthening balance proprioception and conditioning exercises followed by CP with IFC to the left knee  Pt is making excellent progress with therapy with decrease in pain at L knee, improvement in flex and ext ROM, strength throughout L hip knee and ankle, and improvement in tolerance for all activity  Pt has been able to progress to ambulation with [facility] for community ambulation and no A D  at home  Pt has been able to progress to reciprocal ambulation x 7 steps then step to pattern  Pt still with ROM deficit into flexion and extension, strength deficit at L quad and hamstrings with limitation in prolonged weightbearing and ambulation therefore would benefit from continued activity in PT to restore functional ROM and strength with return to all ADL's and IADL's unrestricted  Impairments: abnormal gait, abnormal or restricted ROM, activity intolerance, impaired physical strength, pain with function and weight-bearing intolerance  Functional limitations: prolonged ambulation, prolonged standing, kneeling, stair negotiationUnderstanding of Dx/Px/POC: good   Prognosis: good    Goals  STG's to be met in 3-4 weeks  1  Decrease pain to 1/10 at worst with activity- partially met  2  Improve L knee AROM to at least 100 deg flex-  met  3  Improve L hip knee and ankle strength to within 8 lbs of R- partially met  4   Improve standing/walking tolerance to 30 minutes - met  5  Pt will sleep through night without waking due to pain-  met      LTG's to be met in 7-8 weeks  1  Decrease pain to 0-2/10 at worst with activity- met  2  Improve ROM to at least 115 degrees flex, 0 degrees extension- not met  3  Improve L hip knee and ankle strength to within 3-5 lbs of R- partially met  4  Improve step negotiation to reciprocal no LOB or instability- progressing not met  5  Improve standing and walking tolerance to at least 1 hour no A D - not met  6  Independent with HEP- ongoing and progressing  7  Increase Foto score to at least 60 -met      Plan  Patient would benefit from: skilled physical therapy  Planned modality interventions: cryotherapy and unattended electrical stimulation  Other planned modality interventions: modalities to be added or modified as needed  Planned therapy interventions: manual therapy, joint mobilization, neuromuscular re-education, patient education, strengthening, therapeutic exercise, therapeutic activities, flexibility, home exercise program, gait training and stretching  Frequency: 3x week (pt to be re-evaluated post-op)  Duration in weeks: 4  Plan of Care beginning date: 2021  Plan of Care expiration date: 3/25/2021  Treatment plan discussed with: patient and PTA        Subjective Evaluation    History of Present Illness  Mechanism of injury: Pt reports pain is now abolished and he only notes stiffness  Pt reports he progressed to cane for community ambulation and no A D  for ambulation in home  Pt reports increased ROM and also is progressing to step to stair negotiation  Not a recurrent problem   Quality of life: good    Pain  Current pain ratin  At best pain ratin  At worst pain ratin  Location: L knee  Quality: stiffness and muscle soreness    Relieving factors: medications    Treatments  Previous treatment: injection treatment  Current treatment: physical therapy  Patient Goals  Patient goals for therapy: decreased pain, increased motion, increased strength, return to sport/leisure activities and independence with ADLs/IADLs  Patient goal: returning to golfing, hunting, and fishing- not met        Objective     Observations     Additional Observation Details  Gait is with RW with limp and decreased step length with decreased L knee flexion- Gait is now with SPC with limp and decreased step length however gait speed has increased    Neurological Testing     Sensation     Knee   Left Knee   Diminished: light touch     Right Knee   Intact: light touch     Comments   Left light touch:  lateral knee and proximal shin decreased to light touch       Active Range of Motion   Left Knee   Flexion: 105 degrees   Extension: -3 degrees     Passive Range of Motion   Left Knee   Flexion: 108 degrees   Extension: 0 degrees     Strength/Myotome Testing     Additional Strength Details                    R                  L  Hip       Flex      40 lbs           31 lbs      Abd       41 lbs           46 lbs      Add       41 lbs           41 lbs  Knee      Ext        41 lbs           37 lbs     Flex        55 lbs           41 lbs  Ankle       DF        20 lbs            26 lbs       PF        53 lbs            35 lbs    General Comments:      Knee Comments  Girth: Suprapatella- 40 cm            Mid joint        -39 6 cm    Standing/walking tolerance 45 minutes- post op standing/walking 30 minutes  Ambulation tolerance less than 1 block- Ambulation tolerance with Spc 2-3 blocks  Stair negotiation is with step to pattern- Stair negotiation is step to and reciprocal for 7 steps  Pt unable to kneel on L knee- pt remains unable to kneel on L knee  Foto-45- increased to 67               Precautions: pre-op visit for L TKR 1/13/2020     Date: 2/23/21 2/3/21 2/15 2/17 2/18   Visit: 11 7 8 9 10   Pain 0/10 L knee 1/10 L knee 0 0-1 stiff 1-2   Manuals        jt mobilization L knee/patella [facility]  ds ds patella   L knee ROM flex/ext [facility] ds ds   Neuro Re-Ed        SLS 3 x 15"  3x 15" 3x 15" 3x 15" mat   Tandem stance        Step-ups 6" x20       Standing hip abd 3# 2/10  2/10 2/10 2/10   Standing marching 3# x20  20x  2/20 2/20   Ther Ex        Supine hip abduction L5 3/10 x30 L4 30x  L4 15x L5 2/15   Quad sets x30 3" x30 3" 30x 3" 30x 3" 30x 3"   SLR's c hang* 1 5# 3/10 2/10 1 5# 2/10 1 5# 2/15 1 5# 4/5 *   Heel slides L5 x30 L4 x30 30x with OP L4 30x  L5 2/15   SAQ's 1 5# 3/10 x30 1 5# 3/10 1 5# 2/15 1 5# 2/15   LAQ's 1 5# 4/10  1 5# 4/10 1 5# 2/15 1 5# 2/15   Adductor squeezes x30 3" x30 3" 30x 3" 30x 3" 30x 3"   Hamstring Curl L5 2/15  L4 2/10 L4 2/15 L5 2/15   Nu-step 5 min bike -------> 10 min L3 12m L4 10m L5   Ther Activity                Gait Training                Modalities        CP w/ IFC L knee Deferred no pain 15 min 15 min  15 m 15m CP

## 2021-02-24 ENCOUNTER — APPOINTMENT (OUTPATIENT)
Dept: PHYSICAL THERAPY | Facility: CLINIC | Age: 66
End: 2021-02-24
Payer: MEDICARE

## 2021-02-25 ENCOUNTER — OFFICE VISIT (OUTPATIENT)
Dept: OBGYN CLINIC | Facility: CLINIC | Age: 66
End: 2021-02-25

## 2021-02-25 VITALS
BODY MASS INDEX: 31.34 KG/M2 | WEIGHT: 195 LBS | DIASTOLIC BLOOD PRESSURE: 83 MMHG | HEART RATE: 83 BPM | HEIGHT: 66 IN | SYSTOLIC BLOOD PRESSURE: 150 MMHG

## 2021-02-25 DIAGNOSIS — Z96.652 S/P TKR (TOTAL KNEE REPLACEMENT) USING CEMENT, LEFT: Primary | ICD-10-CM

## 2021-02-25 PROCEDURE — 99024 POSTOP FOLLOW-UP VISIT: CPT | Performed by: ORTHOPAEDIC SURGERY

## 2021-02-25 NOTE — PROGRESS NOTES
Assessment/Plan:    No problem-specific Assessment & Plan notes found for this encounter  Diagnoses and all orders for this visit:    S/P TKR (total knee replacement) using cement, left          The patient is doing quite well  Continue home exercise program   Continue stretching  Return back in 6 weeks for re-evaluation with new x-rays of left knee -three views    Subjective:      Patient ID: Melba Suarez is a 77 y o  male  HPI     the patient is status post left total knee replacement from 6 weeks ago  He offers no complaints of pain  He denies any numbness or tingling  He denies any fever chills  He is ambulating with a single-point cane but he states he has does not use distal required  He has been participating in outpatient physical therapy for approximately 3 weeks  The following portions of the patient's history were reviewed and updated as appropriate: allergies, current medications, past family history, past medical history, past social history, past surgical history and problem list     Review of Systems   Constitutional: Negative for chills, fever and unexpected weight change  HENT: Negative for hearing loss, nosebleeds and sore throat  Eyes: Negative for pain, redness and visual disturbance  Respiratory: Negative for cough, shortness of breath and wheezing  Cardiovascular: Negative for chest pain, palpitations and leg swelling  Gastrointestinal: Negative for abdominal pain, nausea and vomiting  Endocrine: Negative for polydipsia and polyuria  Genitourinary: Negative for dysuria and hematuria  Musculoskeletal: Negative for arthralgias, back pain, gait problem, joint swelling, myalgias and neck pain  As noted in HPI   Skin: Negative for rash and wound  Neurological: Negative for dizziness, numbness and headaches  Psychiatric/Behavioral: Negative for decreased concentration and suicidal ideas  The patient is not nervous/anxious            Objective:      BP 150/83 (BP Location: Left arm, Patient Position: Sitting, Cuff Size: Large)   Pulse 83   Ht 5' 6" (1 676 m)   Wt 88 5 kg (195 lb)   BMI 31 47 kg/m²          Physical Exam        Left lower extremity is neurovascular intact  Toes are pink and mobile  Compartments are soft  Incision is clean, dry, intact  Range of motion of the knee is from 0-125 degrees  No collateral ligament dysfunction  Quadriceps strength intact

## 2021-02-26 ENCOUNTER — OFFICE VISIT (OUTPATIENT)
Dept: PHYSICAL THERAPY | Facility: CLINIC | Age: 66
End: 2021-02-26
Payer: MEDICARE

## 2021-02-26 ENCOUNTER — PATIENT OUTREACH (OUTPATIENT)
Dept: CASE MANAGEMENT | Facility: OTHER | Age: 66
End: 2021-02-26

## 2021-02-26 DIAGNOSIS — Z96.652 S/P TKR (TOTAL KNEE REPLACEMENT) USING CEMENT, LEFT: ICD-10-CM

## 2021-02-26 DIAGNOSIS — M17.12 OSTEOARTHRITIS OF LEFT KNEE, UNSPECIFIED OSTEOARTHRITIS TYPE: Primary | ICD-10-CM

## 2021-02-26 PROCEDURE — 97140 MANUAL THERAPY 1/> REGIONS: CPT

## 2021-02-26 PROCEDURE — 97110 THERAPEUTIC EXERCISES: CPT

## 2021-02-26 PROCEDURE — 97112 NEUROMUSCULAR REEDUCATION: CPT

## 2021-02-26 NOTE — PROGRESS NOTES
Daily Note     Today's date: 2021  Patient name: Augustus Cavazos  : 1955  MRN: [id number]  Referring provider: Jeff Lindquist DO  Dx:   Encounter Diagnosis     ICD-10-CM    1  Osteoarthritis of left knee, unspecified osteoarthritis type  M17 12    2  S/P TKR (total knee replacement) using cement, left  [id number]        Start Time: 1030  Stop Time: 1150  Total time in clinic (min): 80 minutes    Subjective: Pt returns from MD F/U; he states the MD is pleased with his progress and recommenced continuing PT   His F/U is 2021  Objective: See treatment diary below   STM to the L ITB provided by PTA today with less discomfort after  Progressed program as able  Assessment: Tolerated treatment well  Patient exhibited good technique with therapeutic exercises      Plan: Continue per plan of care        Precautions: pre-op visit for L TKR 2020     Date: 2/23/21 2/26 2/15 2/17 2/18   Visit: 11 12 8 9 10   Pain 0/10 L knee 1 0 0-1 stiff 1-2   Manuals        jt mobilization L knee/patella JH ds patella  ds ds patella   L knee ROM flex/ext JH ds KW ds ds   Neuro Re-Ed        SLS mat 3 x 15" 3x 15" 3x 15" 3x 15" 3x 15" mat   Tandem stance        Step-ups 6" x20 6" 20x      Standing hip abd 3# 2/10 3# 2/15 2/10 2/10 2/10   Standing marching 3# x20 3# 2/15 20x  2/20 2/20   Ther Ex        Supine hip abduction L5 3/10 L5 2/15 L4 30x  L4 15x L5 2/15   Quad sets x30 3" x30 3" 30x 3" 30x 3" 30x 3"   SLR's c hang* 1 5# 3/10 2/10 1 5# 2/10 1 5# 2/15 1 5# 4/5 *   Heel slides L5 x30 L5 x30 30x with OP L4 30x  L5 2/15   SAQ's 1 5# 3/10 3# 2/15 1 5# 3/10 1 5# 2/15 1 5# 2/15   LAQ's 1 5# 4/10 3# 2/15 1 5# 4/10 1 5# 2/15 1 5# 2/15   Adductor squeezes x30 3" 30x 3" 30x 3" 30x 3" 30x 3"   Hamstring Curl L5 2/15 L5 2/15 L4 2/10 L4 2/15 L5 2/15   Nu-step 5 min bike NS 15m L5 10 min L3 12m L4 10m L5   Ther Activity                Gait Training                Modalities        CP w/ IFC L knee Deferred no pain 15m CP, HP lat  thigh 15 min  15 m 15m CP

## 2021-02-26 NOTE — PROGRESS NOTES
Chart review reveals that patient saw orthopedics on 2/25/21  Patient is participating in outpatient PT  No complications noted  Patient is to return to orthopedics in 6 weeks

## 2021-03-01 ENCOUNTER — OFFICE VISIT (OUTPATIENT)
Dept: PHYSICAL THERAPY | Facility: CLINIC | Age: 66
End: 2021-03-01
Payer: MEDICARE

## 2021-03-01 DIAGNOSIS — M17.12 OSTEOARTHRITIS OF LEFT KNEE, UNSPECIFIED OSTEOARTHRITIS TYPE: Primary | ICD-10-CM

## 2021-03-01 DIAGNOSIS — Z96.652 S/P TKR (TOTAL KNEE REPLACEMENT) USING CEMENT, LEFT: ICD-10-CM

## 2021-03-01 PROCEDURE — 97010 HOT OR COLD PACKS THERAPY: CPT

## 2021-03-01 PROCEDURE — 97140 MANUAL THERAPY 1/> REGIONS: CPT

## 2021-03-01 PROCEDURE — 97112 NEUROMUSCULAR REEDUCATION: CPT

## 2021-03-01 PROCEDURE — 97110 THERAPEUTIC EXERCISES: CPT

## 2021-03-01 NOTE — PROGRESS NOTES
Daily Note     Today's date: 3/1/2021  Patient name: Francia Cobb  : 1955  MRN: [id number]  Referring provider: Ayaka Espinosa DO  Dx:   Encounter Diagnosis     ICD-10-CM    1  Osteoarthritis of left knee, unspecified osteoarthritis type  M17 12    2  S/P TKR (total knee replacement) using cement, left  [id number]        Start Time: 1330  Stop Time: 1500  Total time in clinic (min): 90 minutes    Subjective: Patient stated having no pain in his knee, just stiffness  Objective: PTA directed patient in TE program, with warm up on LE bike, followed by manual  See treatment diary below  Assessment: Tolerated treatment well  Patient exhibited good technique with therapeutic exercises, minimal fatigue noted post treat  Discomfort with PROM in flexion > extension  Plan: Continue per plan of care        Precautions: pre-op visit for L TKR 2020     Date: 2/23/21 2/26 3/1 2/17 2/18   Visit: 11 12 13 9 10   Pain 0/10 L knee 1 0 0-1 stiff 1-2   Manuals        jt mobilization L knee/patella JH ds patella  ds ds patella   L knee ROM flex/ext JH ds KW ds ds   Neuro Re-Ed        SLS mat 3 x 15" 3x 15" 3x 15"  3x 15" 3x 15" mat   Tandem stance        Step-ups 6" x20 6" 20x 6" 20x      Standing hip abd 3# 2/10 3# 2/15 3# 2/15 2/10 2/10   Standing marching 3# x20 3# 2/15 3# 20x  2/20 2/20   Ther Ex        Supine hip abduction L5 3/10 L5 2/15 L5 2/15 L4 15x L5 2/15   Quad sets x30 3" x30 3" 30x 3" 30x 3" 30x 3"   SLR's c hang* 1 5# 3/10 2/10 3# 2/10 1 5# 2/15 1 5# 4/5 *   Heel slides L5 x30 L5 x30 L5 30x L4 30x  L5 2/15   SAQ's 1 5# 3/10 3# 2/15 3# 2/15 1 5# 2/15 1 5# 2/15   LAQ's 1 5# 4/10 3# 2/15 3# 2/15 1 5# 2/15 1 5# 2/15   Adductor squeezes x30 3" 30x 3" 30x 3"  30x 3" 30x 3"   Hamstring Curl L5 2/15 L5 2/15 L5 2/15 L4 2/15 L5 2/15   Nu-step 5 min bike NS 15m L5 6 min self on bike 12m L4 10m L5   Ther Activity                Gait Training                Modalities        CP w/ IFC L knee Deferred no pain 15m CP, HP lat   thigh 15 m CP 15 m 15m CP

## 2021-03-03 ENCOUNTER — OFFICE VISIT (OUTPATIENT)
Dept: PHYSICAL THERAPY | Facility: CLINIC | Age: 66
End: 2021-03-03
Payer: MEDICARE

## 2021-03-03 DIAGNOSIS — M17.12 OSTEOARTHRITIS OF LEFT KNEE, UNSPECIFIED OSTEOARTHRITIS TYPE: Primary | ICD-10-CM

## 2021-03-03 DIAGNOSIS — Z96.652 S/P TKR (TOTAL KNEE REPLACEMENT) USING CEMENT, LEFT: ICD-10-CM

## 2021-03-03 PROCEDURE — 97112 NEUROMUSCULAR REEDUCATION: CPT

## 2021-03-03 PROCEDURE — 97110 THERAPEUTIC EXERCISES: CPT

## 2021-03-03 PROCEDURE — 97140 MANUAL THERAPY 1/> REGIONS: CPT

## 2021-03-03 NOTE — PROGRESS NOTES
Daily Note     Today's date: 3/3/2021  Patient name: Javier Sanford  : 1955  MRN: [id number]  Referring provider: Loyda Luis DO  Dx:   Encounter Diagnosis     ICD-10-CM    1  Osteoarthritis of left knee, unspecified osteoarthritis type  M17 12    2  S/P TKR (total knee replacement) using cement, left  [id number]        Start Time: 1045  Stop Time: 1200  Total time in clinic (min): 75 minutes    Subjective: Pt reports absent c/o pain with his L knee  He notes less     Objective: See treatment diary below   PTA provided trigger pt release to the lateral gastroc  Assessment: Tolerated treatment well  Patient exhibited good technique with therapeutic exercises      Plan: Continue per plan of care  Precautions: pre-op visit for L TKR 2020     Date: 2/23/21 2/26 3/1 3/3 2/18   Visit: 11 12 13 14 10   Pain 0/10 L knee 1 0 0 1-2   Manuals        jt mobilization L knee/patella JH ds patella  Ds patella ds patella   L knee ROM flex/ext JH ds KW ds ds   Neuro Re-Ed              SLS mat 3 x 15" 3x 15" 3x 15"  3x 15" 3x 15" mat   Tandem stance        Step-ups 6" x20 6" 20x 6" 20x  6" 20x    Standing hip abd 3# 2/10 3# 2/15 3# 2/15 3# 2/10 2/10   Standing marching 3# x20 3# 2/15 3# 20x  2/20 2/20   Ther Ex        Supine hip abduction L5 3/10 L5 2/15 L5 2/15 L5 2/15 L5 2/15   Quad sets x30 3" x30 3" 30x 3" 30x 3" 30x 3"   SLR's c hang* 1 5# 3/10 2/10 3# 2/10 3# 2/15 1 5# 4/5 *   Heel slides L5 x30 L5 x30 L5 30x L4 30x  L5 2/15   SAQ's 1 5# 3/10 3# 2/15 3# 2/15 3# 2/15 1 5# 2/15   LAQ's 1 5# 4/10 3# 2/15 3# 2/15 3# 2/15 1 5# 2/15   Adductor squeezes x30 3" 30x 3" 30x 3"  30x 3" 30x 3"   Hamstring Curl L5 2/15 L5 2/15 L5 2/15 L4 2/15 L5 2/15   Nu-step/Bike 5 min bike NS 15m L5 6 min self on bike B 10m   L1 10m L5   Ther Activity                Gait Training                Modalities        CP w/ IFC L knee Deferred no pain 15m CP, HP lat   thigh 15 m CP 15 m CP 15m CP

## 2021-03-05 ENCOUNTER — OFFICE VISIT (OUTPATIENT)
Dept: PHYSICAL THERAPY | Facility: CLINIC | Age: 66
End: 2021-03-05
Payer: MEDICARE

## 2021-03-05 DIAGNOSIS — M17.12 OSTEOARTHRITIS OF LEFT KNEE, UNSPECIFIED OSTEOARTHRITIS TYPE: Primary | ICD-10-CM

## 2021-03-05 DIAGNOSIS — Z96.652 S/P TKR (TOTAL KNEE REPLACEMENT) USING CEMENT, LEFT: ICD-10-CM

## 2021-03-05 PROCEDURE — 97110 THERAPEUTIC EXERCISES: CPT | Performed by: PHYSICAL THERAPIST

## 2021-03-05 PROCEDURE — 97112 NEUROMUSCULAR REEDUCATION: CPT | Performed by: PHYSICAL THERAPIST

## 2021-03-05 PROCEDURE — 97140 MANUAL THERAPY 1/> REGIONS: CPT | Performed by: PHYSICAL THERAPIST

## 2021-03-05 NOTE — PROGRESS NOTES
Daily Note     Today's date: 3/5/2021  Patient name: Melba Suarez  : 1955  MRN: [id number]  Referring provider: Kaylee Hanson DO  Dx:   Encounter Diagnosis     ICD-10-CM    1  Osteoarthritis of left knee, unspecified osteoarthritis type  M17 12    2  S/P TKR (total knee replacement) using cement, left  Z96 [id number]                   Subjective: Pt reports stiffness this date and denies pain  Objective: See treatment diary below  Stair negotiation is mainly reciprocal  Pt has progressed to ambulation no A D  Cane occasionally for community ambulation  PROM flex L knee 113, AROM 112 degrees, ext PROM 0 degrees  Assessment: Tolerated treatment well  Patient exhibited good technique with therapeutic exercises Pt demonstrating improvement in AROM and PROM flexion and extension  No increase in pain post session with decreased stiffness noted  Plan: Continue per plan of care        Precautions: pre-op visit for L TKR 2020     Date: 2/23/21 2/26 3/1 3/3 3/5   Visit: 11 12 13 14 15   Pain 0/10 L knee 1 0 0 0   Manuals        jt mobilization L knee/patella JH ds patella  Ds patella JH    L knee ROM flex/ext JH ds KW ds JH   Neuro Re-Ed              SLS mat 3 x 15" 3x 15" 3x 15"  3x 15" 3x 15" mat   Tandem stance        Step-ups 6" x20 6" 20x 6" 20x  6" 20x 6" x20   Standing hip abd 3# 2/10 3# 2/15 3# 2/15 3# 2/10 3#2/10   Standing marching 3# x20 3# 2/15 3# 20x  2/20 3#2/20   Ball squats     x15   Ther Ex        Supine hip abduction L5 3/10 L5 2/15 L5 2/15 L5 2/15 L5 2/15   Quad sets x30 3" x30 3" 30x 3" 30x 3" 30x 3"   SLR's c hang* 1 5# 3/10 2/10 3# 2/10 3# 2/15 3# 4/5 *   Heel slides L5 x30 L5 x30 L5 30x L4 30x  L5 2/15   SAQ's 1 5# 3/10 3# 2/15 3# 2/15 3# 2/15 3# 2/15   LAQ's 1 5# 4/10 3# 2/15 3# 2/15 3# 2/15 3# 2/15   Adductor squeezes x30 3" 30x 3" 30x 3"  30x 3" 30x 3"   Hamstring Curl L5 2/15 L5 2/15 L5 2/15 L4 2/15 L5 2/15   Nu-step/Bike 5 min bike NS 15m L5 6 min self on bike B 10m   L1 10m L1   Ther Activity                Gait Training                Modalities        CP w/ IFC L knee Deferred no pain 15m CP, HP lat   thigh 15 m CP 15 m CP 15m CP

## 2021-03-08 ENCOUNTER — OFFICE VISIT (OUTPATIENT)
Dept: PHYSICAL THERAPY | Facility: CLINIC | Age: 66
End: 2021-03-08
Payer: MEDICARE

## 2021-03-08 DIAGNOSIS — Z96.652 S/P TKR (TOTAL KNEE REPLACEMENT) USING CEMENT, LEFT: ICD-10-CM

## 2021-03-08 DIAGNOSIS — M17.12 OSTEOARTHRITIS OF LEFT KNEE, UNSPECIFIED OSTEOARTHRITIS TYPE: Primary | ICD-10-CM

## 2021-03-08 PROCEDURE — 97112 NEUROMUSCULAR REEDUCATION: CPT

## 2021-03-08 PROCEDURE — 97140 MANUAL THERAPY 1/> REGIONS: CPT

## 2021-03-08 PROCEDURE — 97110 THERAPEUTIC EXERCISES: CPT

## 2021-03-08 NOTE — PROGRESS NOTES
Daily Note     Today's date: 3/8/2021  Patient name: Jeffy De Leon  : 1955  MRN: [id number]  Referring provider: Shaneka Villarreal DO  Dx:   Encounter Diagnosis     ICD-10-CM    1  Osteoarthritis of left knee, unspecified osteoarthritis type  M17 12    2  S/P TKR (total knee replacement) using cement, left  Z96 [id number]        Start Time: 0945  Stop Time: 1100  Total time in clinic (min): 75 minutes    Subjective: Pt offers no no c/o  Objective: See treatment diary below  Modified  Program to include life fitness equipment per PT POC  PTA continued STM on the L IT-Band  Assessment: Tolerated treatment well  Patient exhibited good technique with therapeutic exercises      Plan: Continue per plan of care  Date: 3/8 2/26 3/1 3/3 3/5   Visit: 16 12 13 14 15   Pain 0 1 0 0 0   Manuals        jt mobilization L knee/patella ds patella ds patella  Ds patella JH    L knee ROM flex/ext ds ds KW ds JH   Neuro Re-Ed        SLS mat     3 x 15" 3x 15" 3x 15"  3x 15" 3x 15"   Tandem stance        Step-ups 6" x20 6" 20x 6" 20x  6" 20x 6" x20   Standing hip abd 3# 2/10 3# 2/15 3# 2/15 3# 2/10 3#2/10   Standing marching 3# x20 3# 2/15 3# 20x  2/20 3#2/20   Ball squats -dc    x15   Ther Ex        Supine hip abduction L5 3/10 L5 2/15 L5 2/15 L5 2/15 L5 2/15   Quad sets 30x 3" x30 3" 30x 3" 30x 3" 30x 3"   SLR's c hang* 3# 3/10 2/10 3# 2/10 3# 2/15 3# 4/5 *   Heel slides dc L5 x30 L5 30x L4 30x  L5 2/15   SAQ's 1 5# 3/10 3# 2/15 3# 2/15 3# 2/15 3# 2/15   LAQ's  LF* 15# 2/10 * 3# 2/15 3# 2/15 3# 2/15 3# 2/15   Adductor squeezes x30 3" 30x 3" 30x 3"  30x 3" 30x 3"   Hamstring Curl  LF* 50# 2/10* L5 2/15 L5 2/15 L4 2/15 L5 2/15   Bike 10m L2 NS 15m L5 6 min self on bike B 10m   L1 10m L1   Ther Activity                Gait Training                Modalities        CP  15m knee 15m CP, HP lat   thigh 15 m CP 15 m CP 15m CP

## 2021-03-10 ENCOUNTER — OFFICE VISIT (OUTPATIENT)
Dept: PHYSICAL THERAPY | Facility: CLINIC | Age: 66
End: 2021-03-10
Payer: MEDICARE

## 2021-03-10 DIAGNOSIS — Z96.652 S/P TKR (TOTAL KNEE REPLACEMENT) USING CEMENT, LEFT: ICD-10-CM

## 2021-03-10 DIAGNOSIS — M17.12 OSTEOARTHRITIS OF LEFT KNEE, UNSPECIFIED OSTEOARTHRITIS TYPE: Primary | ICD-10-CM

## 2021-03-10 PROCEDURE — 97140 MANUAL THERAPY 1/> REGIONS: CPT | Performed by: PHYSICAL THERAPIST

## 2021-03-10 PROCEDURE — 97112 NEUROMUSCULAR REEDUCATION: CPT | Performed by: PHYSICAL THERAPIST

## 2021-03-10 PROCEDURE — 97110 THERAPEUTIC EXERCISES: CPT | Performed by: PHYSICAL THERAPIST

## 2021-03-10 NOTE — PROGRESS NOTES
Daily Note     Today's date: 3/10/2021  Patient name: Jericho Mcmanus  : 1955  MRN: [id number]  Referring provider: Don Cruz DO  Dx:   Encounter Diagnosis     ICD-10-CM    1  Osteoarthritis of left knee, unspecified osteoarthritis type  M17 12    2  S/P TKR (total knee replacement) using cement, left  Z96 652                   Subjective: Pt reports stiffness this date L knee but denies pain  Pt notes ankles and bunion on R foot even bothering him today  Objective: See treatment diary below  PROM L knee flex 113, ext 0  Modified program per grid with progression to life fitness machines this date  IT band tenderness and tightness still present  Assessment: Tolerated treatment well  Patient exhibited good technique with therapeutic exercises Pt still with increased pain at end-range with stretching and cross friction massage to IT band  Pt tolerated addition of LF machines well with mild increase in soreness post LF extension  Plan: Continue per plan of care        Precautions: pre-op visit for L TKR 2020     Date: 3/8 3/10 3/1 3/3 3/5   Visit: 16 17 13 14 15   Pain 0/10 L knee 0/10 0 0 0   Manuals        jt mobilization L knee/patella DS JH IT band STM  Ds patella JH    L knee ROM flex/ext DS JH KW ds JH   Neuro Re-Ed          SLS mat 3 x 15" 3x 15" 3x 15"  3x 15" 3x 15" mat   Tandem stance        Step-ups 6" x20 6" 20x 6" 20x  6" 20x 6" x20   Standing hip abd 3# 2/10 3# 2/15 3# 2/15 3# 2/10 3#2/10   Standing marching 3# x20 3# 2/15 3# 20x  2/20 3#2/20   Ball squats  x20   x15   Ther Ex        Supine hip abduction L5 3/10 L5 2/15 L5 2/15 L5 2/15 L5 2/15   Quad sets x30 3" x30 3" 30x 3" 30x 3" 30x 3"   SLR's c hang* 1 5# 3/10 3# 2/10 3# 2/10 3# 2/15 3# 4/5 *   Heel slides L5 x30 50# 2/10 L5 30x L4 30x  L5 2/15   SAQ's 1 5# 3/10 3# 2/15 3# 2/15 3# 2/15 3# 2/15   LAQ's 1 5# 4/10 45# 2/10 3# 2/15 3# 2/15 3# 2/15   Adductor squeezes x30 3" 30x 3" 30x 3"  30x 3" 30x 3"   Hamstring Curl L5 2/15 50# 2/10 L5 2/15 L4 2/15 L5 2/15   Nu-step/Bike 5 min bike  10m L3 bike 6 min self on bike B 10m   L1 10m L1   Ther Activity                Gait Training                Modalities        CP w/ IFC L knee Deferred no pain 15m CP 15 m CP 15 m CP 15m CP

## 2021-03-12 ENCOUNTER — OFFICE VISIT (OUTPATIENT)
Dept: PHYSICAL THERAPY | Facility: CLINIC | Age: 66
End: 2021-03-12
Payer: MEDICARE

## 2021-03-12 DIAGNOSIS — Z96.652 S/P TKR (TOTAL KNEE REPLACEMENT) USING CEMENT, LEFT: ICD-10-CM

## 2021-03-12 DIAGNOSIS — M17.12 OSTEOARTHRITIS OF LEFT KNEE, UNSPECIFIED OSTEOARTHRITIS TYPE: Primary | ICD-10-CM

## 2021-03-12 PROCEDURE — 97140 MANUAL THERAPY 1/> REGIONS: CPT

## 2021-03-12 PROCEDURE — 97112 NEUROMUSCULAR REEDUCATION: CPT

## 2021-03-12 PROCEDURE — 97110 THERAPEUTIC EXERCISES: CPT

## 2021-03-12 PROCEDURE — 97014 ELECTRIC STIMULATION THERAPY: CPT

## 2021-03-12 NOTE — PROGRESS NOTES
Daily Note     Today's date: 3/12/2021  Patient name: Erika Hopper  : 1955  MRN: [id number]  Referring provider: Domonique Blevins DO  Dx:   Encounter Diagnosis     ICD-10-CM    1  Osteoarthritis of left knee, unspecified osteoarthritis type  M17 12    2  S/P TKR (total knee replacement) using cement, left  Z96 652        Start Time: 1030  Stop Time: 1145  Total time in clinic (min): 75 minutes    Subjective: Patient stated more pain in ankle > knee at this time  Objective: PTA directed patient in TE program, held on LF leg press due to increased pain in ankle, See treatment diary below  Assessment: Tolerated treatment well  Patient exhibited good technique with therapeutic exercises and would benefit from continued PT to increase overall function and improve strength  Plan: Continue per plan of care        Precautions: pre-op visit for L TKR 2020     Date: 3/8 3/10 3/12 3/3 3/5   Visit: 16 17 18 14 15   Pain 0/10 L knee 0/10 0 0 0   Manuals        jt mobilization L knee/patella DS JH IT band STM def Ds patella JH    L knee ROM flex/ext DS JH KW ds JH   Neuro Re-Ed          SLS mat 3 x 15" 3x 15" 3x 15" 3x 15" 3x 15" mat   Tandem stance        Step-ups 6" x20 6" 20x 6" 20x 6" 20x 6" x20   Standing hip abd 3# 2/10 3# 2/15 3# 2/15 3# 2/10 3#2/10   Standing marching 3# x20 3# 2/15 3# 28/15 2/20 3#2/20   Ball squats  x20   x15   Ther Ex        Supine hip abduction L5 3/10 L5 2/15 L5 2/15 L5 2/15 L5 2/15   Quad sets x30 3" x30 3" 30x 3" 30x 3" 30x 3"   SLR's c hang* 1 5# 3/10 3# 2/10 3# 2/10 3# 2/15 3# 4/5 *   Heel slides L5 x30 50# 2/10 L5 30x L4 30x  L5 2/15   SAQ's 1 5# 3/10 3# 2/15 3# 2/15 3# 2/15 3# 2/15   LAQ's 1 5# 4/10 45# 2/10 45# 2/10 3# 2/15 3# 2/15   Adductor squeezes x30 3" 30x 3" 30x 3" 30x 3" 30x 3"   Hamstring Curl L5 2/15 50# 2/10 50# 2/10 L4 2/15 L5 215   Nu-step/Bike 5 min bike  10m L3 bike 10 min B 10m   L1 10m L1   Ther Activity                Gait Training Modalities        CP w/ IFC L knee Deferred no pain 15m CP 15 m CP 15 m CP 15m CP

## 2021-03-15 ENCOUNTER — EVALUATION (OUTPATIENT)
Dept: PHYSICAL THERAPY | Facility: CLINIC | Age: 66
End: 2021-03-15
Payer: MEDICARE

## 2021-03-15 DIAGNOSIS — Z96.652 S/P TKR (TOTAL KNEE REPLACEMENT) USING CEMENT, LEFT: ICD-10-CM

## 2021-03-15 DIAGNOSIS — M17.12 OSTEOARTHRITIS OF LEFT KNEE, UNSPECIFIED OSTEOARTHRITIS TYPE: Primary | ICD-10-CM

## 2021-03-15 PROCEDURE — 97110 THERAPEUTIC EXERCISES: CPT | Performed by: PHYSICAL THERAPIST

## 2021-03-15 PROCEDURE — 97140 MANUAL THERAPY 1/> REGIONS: CPT | Performed by: PHYSICAL THERAPIST

## 2021-03-15 PROCEDURE — 97112 NEUROMUSCULAR REEDUCATION: CPT | Performed by: PHYSICAL THERAPIST

## 2021-03-15 PROCEDURE — 97014 ELECTRIC STIMULATION THERAPY: CPT | Performed by: PHYSICAL THERAPIST

## 2021-03-15 NOTE — LETTER
March 15, 2021    Shreyas Simms, [address]    Patient: Shakira Sevilla   YOB: 1955   Date of Visit: 3/15/2021     Encounter Diagnosis     ICD-10-CM    1  Osteoarthritis of left knee, unspecified osteoarthritis type  M17 12    2  S/P TKR (total knee replacement) using cement, left  [id number]        Dear Dr Isela Haskins:    Thank you for your recent referral of Shakira Sevilla  Please review the attached evaluation summary from Gene's recent visit  Please verify that you agree with the plan of care by signing the attached order  If you have any questions or concerns, please do not hesitate to call  I sincerely appreciate the opportunity to share in the care of one of your patients and hope to have another opportunity to work with you in the near future  Sincerely,    Rebekah Hunter, PT      Referring Provider:      I certify that I have read the below Plan of Care and certify the need for these services furnished under this plan of treatment while under my care  Shreyas Simms DO  [address]  [place]          PT Re-Evaluation     Today's date: 3/15/2021  Patient name: Shakira Sevilla  : 1955  MRN: [id number]  Referring provider: Nieves Arrieta DO  Dx:   Encounter Diagnosis     ICD-10-CM    1  Osteoarthritis of left knee, unspecified osteoarthritis type  M17 12    2  S/P TKR (total knee replacement) using cement, left  [id number]                   Assessment  Assessment details: Pt is a 77year old male presenting for a post-op evaluation s/p L TKR on 2021  Pt has been seen for 17 visits of Outpatient PT with treatment consisting of manual stretching, patella and joint mobilization, ROM, stretching strengthening balance proprioception and conditioning exercises followed by CP to the left knee  Pt continues to make excellent progress with PT   Pt demonstrating strength nearly equal to or greater than R hip knee and ankle  Pt continues to demonstrate improvement in flex and extension both actively and passively at L knee  Pt has returned to reciprocal stair negotiation and all activity except playing golf and kneeling  Pt is most limited by increased L ankle pain with TE  Pt still lacking 2 degrees of extension and gait is still with limp but more limited by ankle  Pt would benefit from continued PT x2 weeks then d/c to [facility] to further improve L knee flexibility ROM strength and to decrease stiffness  Impairments: abnormal gait, abnormal or restricted ROM, activity intolerance and pain with function  Functional limitations: prolonged ambulation, prolonged standing, kneeling, stair negotiationUnderstanding of Dx/Px/POC: good   Prognosis: good    Goals  STG's to be met in 3-4 weeks   1  Decrease pain to 1/10 at worst with activity- met  2  Improve L knee AROM to at least 100 deg flex-  met  3  Improve L hip knee and ankle strength to within 8 lbs of R- met  4  Improve standing/walking tolerance to 30 minutes - met  5  Pt will sleep through night without waking due to pain-  met      LTG's to be met in 7-8 weeks  1  Decrease pain to 0-2/10 at worst with activity- met  2  Improve ROM to at least 115 degrees flex, 0 degrees extension- partially met  3  Improve L hip knee and ankle strength to within 3-5 lbs of R-  met  4  Improve step negotiation to reciprocal no LOB or instability- met  5  Improve standing and walking tolerance to at least 1 hour no A D - partially met  6  Independent with HEP- ongoing and progressing  7   Increase Foto score to at least 60 -met      Plan  Plan details: Pt will be discharged to [facility] in 2 weeks  Patient would benefit from: skilled physical therapy  Planned modality interventions: cryotherapy  Other planned modality interventions: modalities to be added or modified as needed  Planned therapy interventions: manual therapy, joint mobilization, neuromuscular re-education, patient education, strengthening, therapeutic exercise, therapeutic activities, flexibility, home exercise program, gait training and stretching  Frequency: 2x week (pt to be re-evaluated post-op)  Duration in weeks: 2  Plan of Care beginning date: 3/15/2021  Plan of Care expiration date: 2021  Treatment plan discussed with: patient and PTA        Subjective Evaluation    History of Present Illness  Mechanism of injury: Pt reports overall knee is improving and pt notes the L knee is almost as good as the right  Pt reports pain is minimal to none  Pt notes he experiences more stiffness and soreness than anything  Pt notes he is back to reciprocal stair negotiation when ankle does not bother him  Not a recurrent problem   Quality of life: good    Pain  Current pain ratin  At best pain ratin  At worst pain ratin  Location: L knee  Quality: stiffness and muscle soreness  Relieving factors: medications    Treatments  Previous treatment: injection treatment  Current treatment: physical therapy  Patient Goals  Patient goals for therapy: decreased pain, increased motion, increased strength, return to sport/leisure activities and independence with ADLs/IADLs  Patient goal: returning to golfing, hunting, and fishing- not met        Objective     Observations     Additional Observation Details  Gait is with RW with limp and decreased step length with decreased L knee flexion- Gait is without A  D  with limp due to ankle pain    Neurological Testing     Sensation     Knee   Left Knee   Diminished: light touch     Right Knee   Intact: light touch     Comments   Left light touch:  lateral knee and proximal shin decreased to light touch       Active Range of Motion   Left Knee   Flexion: 116 degrees   Extension: -2 degrees     Passive Range of Motion   Left Knee   Flexion: 120 degrees   Extension: 0 degrees     Strength/Myotome Testing     Additional Strength Details                    R L  Hip       Flex      40 lbs           37 lbs      Abd       41 lbs           50 lbs      Add       41 lbs           41 lbs  Knee      Ext        41 lbs           60 lbs     Flex        55 lbs          55 lbs  Ankle       DF        20 lbs            26 lbs       PF        53 lbs            50 lbs    General Comments:      Knee Comments  Girth: Suprapatella- 39 9 cm            Mid joint        -39 6 cm    Standing/walking tolerance 45 minutes- post op standing/walking 30 minutes- limited by feet not knee  Ambulation tolerance less than 1 block- Ambulation tolerance without A D  greater than 1 mile or 1 hour  Stair negotiation is with step to pattern- Stair negotiation is reciprocal when ankle is not bothersome  Pt unable to kneel on L knee- pt has not attempt to kneel on L knee  Foto-45- increased to 67               Precautions: pre-op visit for L TKR 1/13/2020     Date: 3/8 3/10 3/12 3/15 3/5   Visit: 14 15 16 17 13   Pain 0/10 L knee 0/10 0 0 0   Manuals        jt mobilization L knee/patella DS  IT band STM def JH patella JH    L knee ROM flex/ext DS [facility] JADEN KW [facility] JADEN JH   Neuro Re-Ed          SLS mat 3 x 15" 3x 15" 3x 15" 3x 15" 3x 15" mat   Tandem stance        Step-ups 6" x20 6" 20x 6" 20x 6" 20x 6" x20   Standing hip abd 3# 2/10 3# 2/15 3# 2/15 3# 2/10 3#2/10   Standing marching 3# x20 3# 2/15 3# 2/15 3# 2/20 3#2/20   Ball squats  x20   x15   Ther Ex        Supine hip abduction L5 3/10 L5 2/15 L5 2/15 L5 2/15 L5 2/15   Quad sets x30 3" x30 3" 30x 3" 30x 3" 30x 3"   SLR's c hang* 1 5# 3/10 3# 2/10 3# 2/10 3# 2/15 3# 4/5 *   Heel slides L5 x30 50# 2/10 L5 30x L5 30x  L5 2/15   SAQ's 1 5# 3/10 3# 2/15 3# 2/15 3# 2/15 3# 2/15   LAQ's 1 5# 4/10 45# 2/10 45# 2/10 45# 2/15 3# 2/15   Adductor squeezes x30 3" 30x 3" 30x 3" 30x 3" 30x 3"   Hamstring Curl L5 2/15 50# 2/10 50# 2/10 50# 2/15 L5 2/15   Nu-step/Bike 5 min bike  10m L3 bike 10 min B 10m L4 10m L1   Ther Activity                Gait Training Modalities        CP w/ IFC L knee Deferred no pain 15m CP 15 m CP 15 m CP 15m CP

## 2021-03-15 NOTE — PROGRESS NOTES
PT Re-Evaluation     Today's date: 3/15/2021  Patient name: Varun Andrea  : 1955  MRN: [id number]  Referring provider: Leroy An DO  Dx:   Encounter Diagnosis     ICD-10-CM    1  Osteoarthritis of left knee, unspecified osteoarthritis type  M17 12    2  S/P TKR (total knee replacement) using cement, left  [id number]                   Assessment  Assessment details: Pt is a 77year old male presenting for a post-op evaluation s/p L TKR on 2021  Pt has been seen for 17 visits of Outpatient PT with treatment consisting of manual stretching, patella and joint mobilization, ROM, stretching strengthening balance proprioception and conditioning exercises followed by CP to the left knee  Pt continues to make excellent progress with PT  Pt demonstrating strength nearly equal to or greater than R hip knee and ankle  Pt continues to demonstrate improvement in flex and extension both actively and passively at L knee  Pt has returned to reciprocal stair negotiation and all activity except playing golf and kneeling  Pt is most limited by increased L ankle pain with TE  Pt still lacking 2 degrees of extension and gait is still with limp but more limited by ankle  Pt would benefit from continued PT x2 weeks then d/c to [facility] to further improve L knee flexibility ROM strength and to decrease stiffness  Impairments: abnormal gait, abnormal or restricted ROM, activity intolerance and pain with function  Functional limitations: prolonged ambulation, prolonged standing, kneeling, stair negotiationUnderstanding of Dx/Px/POC: good   Prognosis: good    Goals  STG's to be met in 3-4 weeks   1  Decrease pain to 1/10 at worst with activity- met  2  Improve L knee AROM to at least 100 deg flex-  met  3  Improve L hip knee and ankle strength to within 8 lbs of R- met  4  Improve standing/walking tolerance to 30 minutes - met  5  Pt will sleep through night without waking due to pain-  met      LTG's to be met in 7-8 weeks  1  Decrease pain to 0-2/10 at worst with activity- met  2  Improve ROM to at least 115 degrees flex, 0 degrees extension- partially met  3  Improve L hip knee and ankle strength to within 3-5 lbs of R-  met  4  Improve step negotiation to reciprocal no LOB or instability- met  5  Improve standing and walking tolerance to at least 1 hour no A D - partially met  6  Independent with HEP- ongoing and progressing  7  Increase Foto score to at least 60 -met      Plan  Plan details: Pt will be discharged to [facility] in 2 weeks  Patient would benefit from: skilled physical therapy  Planned modality interventions: cryotherapy  Other planned modality interventions: modalities to be added or modified as needed  Planned therapy interventions: manual therapy, joint mobilization, neuromuscular re-education, patient education, strengthening, therapeutic exercise, therapeutic activities, flexibility, home exercise program, gait training and stretching  Frequency: 2x week (pt to be re-evaluated post-op)  Duration in weeks: 2  Plan of Care beginning date: 3/15/2021  Plan of Care expiration date: 2021  Treatment plan discussed with: patient and PTA        Subjective Evaluation    History of Present Illness  Mechanism of injury: Pt reports overall knee is improving and pt notes the L knee is almost as good as the right  Pt reports pain is minimal to none  Pt notes he experiences more stiffness and soreness than anything  Pt notes he is back to reciprocal stair negotiation when ankle does not bother him  Not a recurrent problem   Quality of life: good    Pain  Current pain ratin  At best pain ratin  At worst pain ratin  Location: L knee  Quality: stiffness and muscle soreness    Relieving factors: medications    Treatments  Previous treatment: injection treatment  Current treatment: physical therapy  Patient Goals  Patient goals for therapy: decreased pain, increased motion, increased strength, return to sport/leisure activities and independence with ADLs/IADLs  Patient goal: returning to golfing, hunting, and fishing- not met        Objective     Observations     Additional Observation Details  Gait is with RW with limp and decreased step length with decreased L knee flexion- Gait is without A  D  with limp due to ankle pain    Neurological Testing     Sensation     Knee   Left Knee   Diminished: light touch     Right Knee   Intact: light touch     Comments   Left light touch:  lateral knee and proximal shin decreased to light touch       Active Range of Motion   Left Knee   Flexion: 116 degrees   Extension: -2 degrees     Passive Range of Motion   Left Knee   Flexion: 120 degrees   Extension: 0 degrees     Strength/Myotome Testing     Additional Strength Details                    R                  L  Hip       Flex      40 lbs           37 lbs      Abd       41 lbs           50 lbs      Add       41 lbs           41 lbs  Knee      Ext        41 lbs           60 lbs     Flex        55 lbs          55 lbs  Ankle       DF        20 lbs            26 lbs       PF        53 lbs            50 lbs    General Comments:      Knee Comments  Girth: Suprapatella- 39 9 cm            Mid joint        -39 6 cm    Standing/walking tolerance 45 minutes- post op standing/walking 30 minutes- limited by feet not knee  Ambulation tolerance less than 1 block- Ambulation tolerance without A D  greater than 1 mile or 1 hour  Stair negotiation is with step to pattern- Stair negotiation is reciprocal when ankle is not bothersome  Pt unable to kneel on L knee- pt has not attempt to kneel on L knee  Foto-45- increased to 67               Precautions: pre-op visit for L TKR 1/13/2020     Date: 3/8 3/10 3/12 3/15 3/5   Visit: 14 15 16 17 13   Pain 0/10 L knee 0/10 0 0 0   Manuals        jt mobilization L knee/patella DS JH IT band STM def JH patella JH    L knee ROM flex/ext DS JH KW JH JH   Neuro Re-Ed          SLS mat 3 x 15" 3x 15" 3x 15" 3x 15" 3x 15" mat   Tandem stance        Step-ups 6" x20 6" 20x 6" 20x 6" 20x 6" x20   Standing hip abd 3# 2/10 3# 2/15 3# 2/15 3# 2/10 3#2/10   Standing marching 3# x20 3# 2/15 3# 2/15 3# 2/20 3#2/20   Ball squats  x20   x15   Ther Ex        Supine hip abduction L5 3/10 L5 2/15 L5 2/15 L5 2/15 L5 2/15   Quad sets x30 3" x30 3" 30x 3" 30x 3" 30x 3"   SLR's c hang* 1 5# 3/10 3# 2/10 3# 2/10 3# 2/15 3# 4/5 *   Heel slides L5 x30 50# 2/10 L5 30x L5 30x  L5 2/15   SAQ's 1 5# 3/10 3# 2/15 3# 2/15 3# 2/15 3# 2/15   LAQ's 1 5# 4/10 45# 2/10 45# 2/10 45# 2/15 3# 2/15   Adductor squeezes x30 3" 30x 3" 30x 3" 30x 3" 30x 3"   Hamstring Curl L5 2/15 50# 2/10 50# 2/10 50# 2/15 L5 2/15   Nu-step/Bike 5 min bike  10m L3 bike 10 min B 10m L4 10m L1   Ther Activity                Gait Training                Modalities        CP w/ IFC L knee Deferred no pain 15m CP 15 m CP 15 m CP 15m CP

## 2021-03-17 ENCOUNTER — APPOINTMENT (OUTPATIENT)
Dept: PHYSICAL THERAPY | Facility: CLINIC | Age: 66
End: 2021-03-17
Payer: MEDICARE

## 2021-03-18 ENCOUNTER — IMMUNIZATIONS (OUTPATIENT)
Dept: FAMILY MEDICINE CLINIC | Facility: HOSPITAL | Age: 66
End: 2021-03-18

## 2021-03-18 DIAGNOSIS — Z23 ENCOUNTER FOR IMMUNIZATION: Primary | ICD-10-CM

## 2021-03-18 PROCEDURE — 0011A SARS-COV-2 / COVID-19 MRNA VACCINE (MODERNA) 100 MCG: CPT

## 2021-03-18 PROCEDURE — 91301 SARS-COV-2 / COVID-19 MRNA VACCINE (MODERNA) 100 MCG: CPT

## 2021-03-19 ENCOUNTER — APPOINTMENT (OUTPATIENT)
Dept: PHYSICAL THERAPY | Facility: CLINIC | Age: 66
End: 2021-03-19
Payer: MEDICARE

## 2021-03-19 ENCOUNTER — PATIENT OUTREACH (OUTPATIENT)
Dept: CASE MANAGEMENT | Facility: OTHER | Age: 66
End: 2021-03-19

## 2021-03-19 NOTE — PROGRESS NOTES
Chart review reveals that patient continues to participate in outpatient PT s/p elective L TKR  Patient had his first dose of COVID-19 vaineon 3/18/21  Patient will have second dose on 4/16/21  Patient has a f/u appointment scheduled with orthopedics on 4/8/21  Patient has declined calls from this [facility] so chart review will continue until end of episode

## 2021-03-22 ENCOUNTER — OFFICE VISIT (OUTPATIENT)
Dept: PHYSICAL THERAPY | Facility: CLINIC | Age: 66
End: 2021-03-22
Payer: MEDICARE

## 2021-03-22 DIAGNOSIS — Z96.652 S/P TKR (TOTAL KNEE REPLACEMENT) USING CEMENT, LEFT: ICD-10-CM

## 2021-03-22 DIAGNOSIS — M17.12 OSTEOARTHRITIS OF LEFT KNEE, UNSPECIFIED OSTEOARTHRITIS TYPE: Primary | ICD-10-CM

## 2021-03-22 PROCEDURE — 97112 NEUROMUSCULAR REEDUCATION: CPT

## 2021-03-22 PROCEDURE — 97110 THERAPEUTIC EXERCISES: CPT

## 2021-03-22 PROCEDURE — 97140 MANUAL THERAPY 1/> REGIONS: CPT

## 2021-03-22 NOTE — PROGRESS NOTES
Daily Note     Today's date: 3/22/2021  Patient name: Varun Andrea  : 1955  MRN: [id number]  Referring provider: Leroy An DO  Dx:   Encounter Diagnosis     ICD-10-CM    1  Osteoarthritis of left knee, unspecified osteoarthritis type  M17 12    2  S/P TKR (total knee replacement) using cement, left  [id number]        Start Time: 1000  Stop Time: 1115  Total time in clinic (min): 75 minutes    Subjective: Pt notes min c/o overall  Objective: See treatment diary below  DC leg press/ball squat      Assessment: Tolerated treatment well  Patient exhibited good technique with therapeutic exercises      Plan: Continue per plan of care        Precautions: pre-op visit for L TKR 2020     Date: 3/8 3/10 3/12 3/15 3/22   Visit: 14 15 16 17 18   Pain 0/10 L knee 0/10 0 0 0   Manuals        jt mobilization L knee/patella DS JH IT band STM def JH patella Ds patella   L knee ROM flex/ext DS [facility] JADEN KW JH ds   Neuro Re-Ed          SLS mat 3 x 15" 3x 15" 3x 15" 3x 15" 3x 15"   Step-ups 6" x20 6" 20x 6" 20x 6" 20x 6" x20   Standing hip abd 3# 2/10 3# 2/15 3# 2/15 3# 2/10 3# 2/10   Standing marching 3# x20 3# 2/15 3# 2/15 3# 2/20 3# 2/20   Ther Ex        Supine hip abduction L5 3/10 L5 2/15 L5 2/15 L5 2/15 L5 2/15   Quad sets x30 3" x30 3" 30x 3" 30x 3" 30x 3"   SLR's c hang* 1 5# 4/5 3# 4/5 3# 4/5 3# 4/5 3# 4/5 *   SAQ's 1 5# 3/10 3# 2/15 3# 2/15 3# 2/15 3# 2/15   LAQ's 1 5# 4/10 45# 2/10 45# 2/10 45# 2/15 45# 2/15   Adductor squeezes x30 3" 30x 3" 30x 3" 30x 3" 30x 3"   Hamstring Curl L5 2/15 50# 2/10 50# 2/10 50# 2/15 50# 2/15   Bike 5 min   10m L3  10 min  10m L4 10m L6   Ther Activity                Gait Training                Modalities        CP w/ IFC L knee Deferred no pain 15m CP 15 m CP 15 m CP 15m CP

## 2021-03-24 ENCOUNTER — APPOINTMENT (OUTPATIENT)
Dept: PHYSICAL THERAPY | Facility: CLINIC | Age: 66
End: 2021-03-24
Payer: MEDICARE

## 2021-03-25 ENCOUNTER — OFFICE VISIT (OUTPATIENT)
Dept: PHYSICAL THERAPY | Facility: CLINIC | Age: 66
End: 2021-03-25
Payer: MEDICARE

## 2021-03-25 DIAGNOSIS — Z96.652 S/P TKR (TOTAL KNEE REPLACEMENT) USING CEMENT, LEFT: ICD-10-CM

## 2021-03-25 DIAGNOSIS — M17.12 OSTEOARTHRITIS OF LEFT KNEE, UNSPECIFIED OSTEOARTHRITIS TYPE: Primary | ICD-10-CM

## 2021-03-25 PROCEDURE — 97140 MANUAL THERAPY 1/> REGIONS: CPT | Performed by: PHYSICAL THERAPIST

## 2021-03-25 PROCEDURE — 97112 NEUROMUSCULAR REEDUCATION: CPT | Performed by: PHYSICAL THERAPIST

## 2021-03-25 PROCEDURE — 97110 THERAPEUTIC EXERCISES: CPT | Performed by: PHYSICAL THERAPIST

## 2021-03-25 NOTE — PROGRESS NOTES
PT Discharge    Today's date: 3/25/2021  Patient name: Liliana Feliciano  : 1955  MRN: [id number]  Referring provider: Shilpa Thayer DO  Dx:   Encounter Diagnosis     ICD-10-CM    1  Osteoarthritis of left knee, unspecified osteoarthritis type  M17 12    2  S/P TKR (total knee replacement) using cement, left  [id number]                   Assessment  Assessment details: Pt is a 77year old male presenting for a post-op evaluation s/p L TKR on 2021  Pt has been seen for 19 visits of Outpatient PT with treatment consisting of manual stretching, patella and joint mobilization, ROM, stretching strengthening balance proprioception and conditioning exercises followed by CP to the left knee  Pt has made excellent progress with therapy  L knee ROM restored with L hip and knee strength greater than R  Pt has returned to all functional activity except kneeling  Pt's only complaint is stiffness after prolonged sitting  Pt at this point is more limited by ankles than L knee  All goals met and pt will transition to HEP  Impairments: abnormal gait and activity intolerance  Functional limitations: prolonged standing, kneelingUnderstanding of Dx/Px/POC: good   Prognosis: good    Goals  STG's to be met in 3-4 weeks   1  Decrease pain to 1/10 at worst with activity- met  2  Improve L knee AROM to at least 100 deg flex-  met  3  Improve L hip knee and ankle strength to within 8 lbs of R- met  4  Improve standing/walking tolerance to 30 minutes - met  5  Pt will sleep through night without waking due to pain-  met      LTG's to be met in 7-8 weeks  1  Decrease pain to 0-2/10 at worst with activity- met  2  Improve ROM to at least 115 degrees flex, 0 degrees extension- met  3  Improve L hip knee and ankle strength to within 3-5 lbs of R-  met  4  Improve step negotiation to reciprocal no LOB or instability- met  5  Improve standing and walking tolerance to at least 1 hour no A D - met  6  Independent with HEP- met  7  Increase Foto score to at least 60 -met      Plan  Plan details: D/C to HEP  Planned therapy interventions: home exercise program  Frequency: pt to be re-evaluated post-op  Plan of Care beginning date: 3/15/2021  Plan of Care expiration date: 3/25/2021  Treatment plan discussed with: patient        Subjective Evaluation    History of Present Illness  Mechanism of injury: Pt reports overall knee is about the same as the right now  Pt reports he has returned to all activity except kneeling  Not a recurrent problem   Quality of life: good    Pain  Current pain ratin  At best pain ratin  At worst pain ratin  Location: L knee  Quality: stiffness and muscle soreness  Relieving factors: medications    Treatments  Previous treatment: injection treatment  Current treatment: physical therapy  Patient Goals  Patient goals for therapy: decreased pain, increased motion, increased strength, return to sport/leisure activities and independence with ADLs/IADLs  Patient goal: returning to golfing, hunting, and fishing-  met        Objective     Observations     Additional Observation Details  Gait is with RW with limp and decreased step length with decreased L knee flexion- Gait is without A  D  with limp due to ankle pain    Neurological Testing     Sensation     Knee   Left Knee   Diminished: light touch     Right Knee   Intact: light touch     Comments   Left light touch:  lateral knee and proximal shin decreased to light touch       Active Range of Motion   Left Knee   Flexion: 116 degrees   Extension: 0 degrees     Passive Range of Motion   Left Knee   Flexion: 120 degrees   Extension: 0 degrees     Strength/Myotome Testing     Additional Strength Details                    R                  L  Hip       Flex      40 lbs           37 lbs      Abd       40 lbs           55 lbs      Add       41 lbs           41 lbs  Knee      Ext        40 lbs           60 lbs     Flex        55 lbs          55 lbs  Ankle DF        20 lbs            26 lbs       PF        53 lbs            50 lbs    General Comments:      Knee Comments  Girth: Suprapatella- 39 9 cm            Mid joint        -39 6 cm    Standing/walking tolerance 45 minutes- post op standing/walking 30 minutes- limited by feet not knee  Ambulation tolerance less than 1 block- Ambulation tolerance without A D  greater than 1 mile or 1 hour  Stair negotiation is with step to pattern- Stair negotiation is reciprocal when ankle is not bothersome  Pt unable to kneel on L knee- pt has not attempt to kneel on L knee  Foto-45- increased to 99               Precautions: pre-op visit for L TKR 1/13/2020     Date: 3/25 3/10 3/12 3/15 3/22   Visit: 19 15 16 17 18   Pain 0/10 L knee 0/10 0 0 0   Manuals        jt mobilization L knee/patella [facility] IT band STM def JH patella Ds patella   L knee ROM flex/ext [facility] JADEN JH KW  ds   Neuro Re-Ed          SLS mat 3 x 15" 3x 15" 3x 15" 3x 15" 3x 15"   Step-ups 6" x20 6" 20x 6" 20x 6" 20x 6" x20   Standing hip abd 3# 2/10 3# 2/15 3# 2/15 3# 2/10 3# 2/10   Standing marching 3# x20 3# 2/15 3# 2/15 3# 2/20 3# 2/20   Ther Ex        Supine hip abduction L5 3/10 L5 2/15 L5 2/15 L5 2/15 L5 2/15   Quad sets x30 3" x30 3" 30x 3" 30x 3" 30x 3"   SLR's c hang* 1 5# 4/5 3# 4/5 3# 4/5 3# 4/5 3# 4/5 *   SAQ's 1 5# 3/10 3# 2/15 3# 2/15 3# 2/15 3# 2/15   LAQ's 1 5# 4/10 45# 2/10 45# 2/10 45# 2/15 45# 2/15   Adductor squeezes x30 3" 30x 3" 30x 3" 30x 3" 30x 3"   Hamstring Curl L5 2/15 50# 2/10 50# 2/10 50# 2/15 50# 2/15   Bike 10min L6  10m L3  10 min  10m L4 10m L6   Ther Activity                Gait Training                Modalities        CP w/ IFC L knee HP 15 min 15m CP 15 m CP 15 m CP 15m CP

## 2021-03-25 NOTE — LETTER
2021    Sajan Peoples, [address]  [facility] [place]    Patient: Eliazar Valle   YOB: 1955   Date of Visit: 3/25/2021     Encounter Diagnosis     ICD-10-CM    1  Osteoarthritis of left knee, unspecified osteoarthritis type  M17 12    2  S/P TKR (total knee replacement) using cement, left  [id number]        Dear Dr Neptali Ibrahim:    Thank you for your recent referral of Eliazar Valle  Please review the attached evaluation summary from Gene's recent visit  Please verify that you agree with the plan of care by signing the attached order  If you have any questions or concerns, please do not hesitate to call  I sincerely appreciate the opportunity to share in the care of one of your patients and hope to have another opportunity to work with you in the near future  Sincerely,    Jenae Rolle, PT      Referring Provider:      I certify that I have read the below Plan of Care and certify the need for these services furnished under this plan of treatment while under my care  Sajan Peoples DO  [address]  [address]  [address]  [place]          PT Discharge    Today's date: 3/25/2021  Patient name: Eliazar Valle  : 1955  MRN: [id number]  Referring provider: Karuna Rock DO  Dx:   Encounter Diagnosis     ICD-10-CM    1  Osteoarthritis of left knee, unspecified osteoarthritis type  M17 12    2  S/P TKR (total knee replacement) using cement, left  [id number]                   Assessment  Assessment details: Pt is a 77year old male presenting for a post-op evaluation s/p L TKR on 2021  Pt has been seen for 19 visits of Outpatient PT with treatment consisting of manual stretching, patella and joint mobilization, ROM, stretching strengthening balance proprioception and conditioning exercises followed by CP to the left knee  Pt has made excellent progress with therapy   L knee ROM restored with L hip and knee strength greater than R  Pt has returned to all functional activity except kneeling  Pt's only complaint is stiffness after prolonged sitting  Pt at this point is more limited by ankles than L knee  All goals met and pt will transition to HEP  Impairments: abnormal gait and activity intolerance  Functional limitations: prolonged standing, kneelingUnderstanding of Dx/Px/POC: good   Prognosis: good    Goals  STG's to be met in 3-4 weeks   1  Decrease pain to 1/10 at worst with activity- met  2  Improve L knee AROM to at least 100 deg flex-  met  3  Improve L hip knee and ankle strength to within 8 lbs of R- met  4  Improve standing/walking tolerance to 30 minutes - met  5  Pt will sleep through night without waking due to pain-  met      LTG's to be met in 7-8 weeks  1  Decrease pain to 0-2/10 at worst with activity- met  2  Improve ROM to at least 115 degrees flex, 0 degrees extension- met  3  Improve L hip knee and ankle strength to within 3-5 lbs of R-  met  4  Improve step negotiation to reciprocal no LOB or instability- met  5  Improve standing and walking tolerance to at least 1 hour no A D - met  6  Independent with HEP- met  7  Increase Foto score to at least 60 -met      Plan  Plan details: D/C to HEP  Planned therapy interventions: home exercise program  Frequency: pt to be re-evaluated post-op  Plan of Care beginning date: 3/15/2021  Plan of Care expiration date: 3/25/2021  Treatment plan discussed with: patient        Subjective Evaluation    History of Present Illness  Mechanism of injury: Pt reports overall knee is about the same as the right now  Pt reports he has returned to all activity except kneeling  Not a recurrent problem   Quality of life: good    Pain  Current pain ratin  At best pain ratin  At worst pain ratin  Location: L knee  Quality: stiffness and muscle soreness    Relieving factors: medications    Treatments  Previous treatment: injection treatment  Current treatment: physical therapy  Patient Goals  Patient goals for therapy: decreased pain, increased motion, increased strength, return to sport/leisure activities and independence with ADLs/IADLs  Patient goal: returning to golfing, hunting, and fishing-  met        Objective     Observations     Additional Observation Details  Gait is with RW with limp and decreased step length with decreased L knee flexion- Gait is without A  D  with limp due to ankle pain    Neurological Testing     Sensation     Knee   Left Knee   Diminished: light touch     Right Knee   Intact: light touch     Comments   Left light touch:  lateral knee and proximal shin decreased to light touch       Active Range of Motion   Left Knee   Flexion: 116 degrees   Extension: 0 degrees     Passive Range of Motion   Left Knee   Flexion: 120 degrees   Extension: 0 degrees     Strength/Myotome Testing     Additional Strength Details                    R                  L  Hip       Flex      40 lbs           37 lbs      Abd       40 lbs           55 lbs      Add       41 lbs           41 lbs  Knee      Ext        40 lbs           60 lbs     Flex        55 lbs          55 lbs  Ankle       DF        20 lbs            26 lbs       PF        53 lbs            50 lbs    General Comments:      Knee Comments  Girth: Suprapatella- 39 9 cm            Mid joint        -39 6 cm    Standing/walking tolerance 45 minutes- post op standing/walking 30 minutes- limited by feet not knee  Ambulation tolerance less than 1 block- Ambulation tolerance without A D  greater than 1 mile or 1 hour  Stair negotiation is with step to pattern- Stair negotiation is reciprocal when ankle is not bothersome  Pt unable to kneel on L knee- pt has not attempt to kneel on L knee  Foto-45- increased to 99               Precautions: pre-op visit for L TKR 1/13/2020     Date: 3/25 3/10 3/12 3/15 3/22   Visit: 19 15 16 17 18   Pain 0/10 L knee 0/10 0 0 0   Manuals        jt mobilization L knee/patella JH JH IT band STM def JH patella Ds patella   L knee ROM flex/ext [facility] JADEN JH KW JH ds   Neuro Re-Ed          SLS mat 3 x 15" 3x 15" 3x 15" 3x 15" 3x 15"   Step-ups 6" x20 6" 20x 6" 20x 6" 20x 6" x20   Standing hip abd 3# 2/10 3# 2/15 3# 2/15 3# 2/10 3# 2/10   Standing marching 3# x20 3# 2/15 3# 2/15 3# 2/20 3# 2/20   Ther Ex        Supine hip abduction L5 3/10 L5 2/15 L5 2/15 L5 2/15 L5 2/15   Quad sets x30 3" x30 3" 30x 3" 30x 3" 30x 3"   SLR's c hang* 1 5# 4/5 3# 4/5 3# 4/5 3# 4/5 3# 4/5 *   SAQ's 1 5# 3/10 3# 2/15 3# 2/15 3# 2/15 3# 2/15   LAQ's 1 5# 4/10 45# 2/10 45# 2/10 45# 2/15 45# 2/15   Adductor squeezes x30 3" 30x 3" 30x 3" 30x 3" 30x 3"   Hamstring Curl L5 2/15 50# 2/10 50# 2/10 50# 2/15 50# 2/15   Bike 10min L6  10m L3  10 min  10m L4 10m L6   Ther Activity                Gait Training                Modalities        CP w/ IFC L knee HP 15 min 15m CP 15 m CP 15 m CP 15m CP

## 2021-03-26 ENCOUNTER — APPOINTMENT (OUTPATIENT)
Dept: PHYSICAL THERAPY | Facility: CLINIC | Age: 66
End: 2021-03-26
Payer: MEDICARE

## 2021-04-08 ENCOUNTER — APPOINTMENT (OUTPATIENT)
Dept: RADIOLOGY | Facility: MEDICAL CENTER | Age: 66
End: 2021-04-08
Payer: MEDICARE

## 2021-04-08 ENCOUNTER — OFFICE VISIT (OUTPATIENT)
Dept: OBGYN CLINIC | Facility: CLINIC | Age: 66
End: 2021-04-08

## 2021-04-08 VITALS — WEIGHT: 195 LBS | BODY MASS INDEX: 31.34 KG/M2 | HEIGHT: 66 IN

## 2021-04-08 DIAGNOSIS — Z96.652 S/P TKR (TOTAL KNEE REPLACEMENT) USING CEMENT, LEFT: Primary | ICD-10-CM

## 2021-04-08 DIAGNOSIS — Z96.652 S/P TKR (TOTAL KNEE REPLACEMENT) USING CEMENT, LEFT: ICD-10-CM

## 2021-04-08 PROCEDURE — 73562 X-RAY EXAM OF KNEE 3: CPT

## 2021-04-08 PROCEDURE — 99024 POSTOP FOLLOW-UP VISIT: CPT | Performed by: ORTHOPAEDIC SURGERY

## 2021-04-08 NOTE — PROGRESS NOTES
ASSESSMENT/PLAN:    Diagnoses and all orders for this visit:    S/P TKR (total knee replacement) using cement, left  -     XR knee 3 vw left non injury; Future        X-rays the patient's left knee show well-seated left total knee replacement  The prosthesis is in good alignment  There are no signs of loosening  There are no fractures or dislocations  Overall, the patient is continuing to do well since surgery  He denies any significant pain  He should continue a home exercise program for strengthening, stretching and range of motion  He will follow up with our office in 3 months with new x-rays of his left knee three views  The patient is acceptable to this plan  Return in about 3 months (around 7/8/2021)  _____________________________________________________  CHIEF COMPLAINT:  Chief Complaint   Patient presents with    Left Knee - Follow-up         SUBJECTIVE:  Chang Garcia is a 77 y o  male status post left total knee replacement from 01/13/2021  The patient states he has been doing very well  He is walking without an antalgic gait or an assist device  He denies any significant pain  He denies any numbness or tingling  He denies any fever or chills  He denies any new falls or trauma  He is very happy with results from his surgery      The following portions of the patient's history were reviewed and updated as appropriate: allergies, current medications, past family history, past medical history, past social history, past surgical history and problem list     PAST MEDICAL HISTORY:  Past Medical History:   Diagnosis Date    Arthritis     Rotator cuff injury     left shoulder       PAST SURGICAL HISTORY:  Past Surgical History:   Procedure Laterality Date    JOINT REPLACEMENT Right 2018    MULTIPLE TOOTH EXTRACTIONS      in office under local   [facility] Right 2/26/2019    Procedure: ARTHROPLASTY KNEE TOTAL;  Surgeon: Venkatesh Ron DO;  Location: [address] [place] [address] MAIN OR;  Service: Orthopedics    [place] TOTAL KNEE ARTHROPLASTY Left 1/13/2021    Procedure: KNEE TOTAL ARTHROPLASTY;  Surgeon: Fe Blas DO;  Location: [address] [place] [address] MAIN OR;  Service: Orthopedics    TONSILLECTOMY         FAMILY HISTORY:  Family History   Problem Relation Age of Onset    No Known Problems Mother     Ulcerative colitis Father     Ulcers Father     No Known Problems Sister     No Known Problems Brother        SOCIAL HISTORY:  Social History     Tobacco Use    Smoking status: Light Tobacco Smoker     Packs/day: 0 25     Years: 51 00     Pack years: 12 75     Types: Cigarettes    Smokeless tobacco: Current User    Tobacco comment: 2 packs a week   Substance Use Topics    Alcohol use:  Yes     Alcohol/week: 4 0 standard drinks     Types: 3 Cans of beer, 1 Shots of liquor per week     Comment: 4-6 drinks a week    Drug use: Never       MEDICATIONS:    Current Outpatient Medications:     ascorbic acid (VITAMIN C) 500 MG tablet, Take 1 tablet (500 mg total) by mouth 2 (two) times a day (Patient taking differently: Take 500 mg by mouth daily ), Disp: 60 tablet, Rfl: 1    aspirin (ECOTRIN) 325 mg EC tablet, Take 1 tablet (325 mg total) by mouth 2 (two) times a day (Patient not taking: Reported on 4/8/2021), Disp: 30 tablet, Rfl: 0    B COMPLEX-C-FOLIC ACID PO, Take 1 tablet by mouth daily, Disp: , Rfl:     docusate sodium (COLACE) 100 mg capsule, Take 1 capsule (100 mg total) by mouth 2 (two) times a day (Patient not taking: Reported on 4/8/2021), Disp: 10 capsule, Rfl: 0    ferrous sulfate 324 (65 Fe) mg, Take 1 tablet (324 mg total) by mouth 2 (two) times a day before meals (Patient not taking: Reported on 4/8/2021), Disp: , Rfl: 0    folic acid (FOLVITE) 1 mg tablet, Take 1 tablet (1 mg total) by mouth daily (Patient not taking: Reported on 4/8/2021), Disp: 30 tablet, Rfl: 0    Multiple Vitamins-Minerals (multivitamin with minerals) tablet, Take 1 tablet by mouth daily, Disp: 30 tablet, Rfl: 1    ALLERGIES:  No Known Allergies    ROS:  Review of Systems     Constitutional: Negative for fatigue, fever or loss of appetite  HENT: Negative  Respiratory: Negative for shortness of breath, dyspnea  Cardiovascular: Negative for chest pain/tightness  Gastrointestinal: Negative for abdominal pain, N/V  Endocrine: Negative for cold/heat intolerance, unexplained weight loss/gain  Genitourinary: Negative for flank pain, dysuria, hematuria  Musculoskeletal:  Negative for arthralgia   Skin: Negative for rash  Neurological: Negative for numbness or tingling  Psychiatric/Behavioral: Negative for agitation  _____________________________________________________  PHYSICAL EXAMINATION:    Height 5' 6" (1 676 m), weight 88 5 kg (195 lb)  Constitutional: Oriented to person, place, and time  Appears well-developed and well-nourished  No distress  HENT:   Head: Normocephalic  Eyes: Conjunctivae are normal  Right eye exhibits no discharge  Left eye exhibits no discharge  No scleral icterus  Cardiovascular: Normal rate  Pulmonary/Chest: Effort normal    Neurological: Alert and oriented to person, place, and time  Skin: Skin is warm and dry  No rash noted  Not diaphoretic  No erythema  No pallor  Psychiatric: Normal mood and affect  Behavior is normal  Judgment and thought content normal       MUSCULOSKELETAL EXAMINATION:   Physical Exam  Ortho Exam    Left lower extremity is neurovascularly intact  Toes are pink and mobile  Compartments are soft  Incision is well-healed  Brisk cap refill  Sensation intact  Range of motion of the knee is from 0-120 degrees  No ligament laxity    Objective:  BP Readings from Last 1 Encounters:   02/25/21 150/83      Wt Readings from Last 1 Encounters:   04/08/21 88 5 kg (195 lb)        BMI:   Estimated body mass index is 31 47 kg/m² as calculated from the following:    Height as of this encounter: 5' 6" (1 676 m)      Weight as of this encounter: 88 5 kg (195 lb)     Radiographs:  _____________________________________________________  STUDIES REVIEWED:  I have personally reviewed pertinent films and reports in PACS  X-rays of the patient's left knee show well-seated left total knee replacement  The prosthesis is in good alignment  There are no signs of loosening  There are no fractures or dislocations          Ceci Carrasco PA-C

## 2021-04-16 ENCOUNTER — IMMUNIZATIONS (OUTPATIENT)
Dept: FAMILY MEDICINE CLINIC | Facility: HOSPITAL | Age: 66
End: 2021-04-16

## 2021-04-16 DIAGNOSIS — Z23 ENCOUNTER FOR IMMUNIZATION: Primary | ICD-10-CM

## 2021-04-16 PROCEDURE — 91301 SARS-COV-2 / COVID-19 MRNA VACCINE (MODERNA) 100 MCG: CPT

## 2021-04-16 PROCEDURE — 0012A SARS-COV-2 / COVID-19 MRNA VACCINE (MODERNA) 100 MCG: CPT

## 2021-04-19 ENCOUNTER — PATIENT OUTREACH (OUTPATIENT)
Dept: CASE MANAGEMENT | Facility: OTHER | Age: 66
End: 2021-04-19

## 2021-04-19 NOTE — PROGRESS NOTES
Chart review reveals that patient saw orthopedics on 4/8/21 for routine follow up of L TKR on 1/13/21  Patient had no c/o pain, numbness, tingling  Patient was walking well and without assistive device  Patient x-rays revealed prothesis was in good alignment  Patient will return in 3 months  Patient has completed his outpatient PT  BPCI episode end  Resolved episode, removed self from care team and updated care coordination note

## 2021-07-08 ENCOUNTER — APPOINTMENT (OUTPATIENT)
Dept: RADIOLOGY | Facility: MEDICAL CENTER | Age: 66
End: 2021-07-08
Payer: MEDICARE

## 2021-07-08 ENCOUNTER — OFFICE VISIT (OUTPATIENT)
Dept: OBGYN CLINIC | Facility: CLINIC | Age: 66
End: 2021-07-08
Payer: MEDICARE

## 2021-07-08 VITALS
SYSTOLIC BLOOD PRESSURE: 155 MMHG | HEIGHT: 66 IN | BODY MASS INDEX: 31.34 KG/M2 | WEIGHT: 195 LBS | HEART RATE: 77 BPM | DIASTOLIC BLOOD PRESSURE: 81 MMHG

## 2021-07-08 DIAGNOSIS — Z96.652 HISTORY OF TOTAL KNEE REPLACEMENT, LEFT: Primary | ICD-10-CM

## 2021-07-08 DIAGNOSIS — Z96.652 HISTORY OF TOTAL KNEE REPLACEMENT, LEFT: ICD-10-CM

## 2021-07-08 PROCEDURE — 1124F ACP DISCUSS-NO DSCNMKR DOCD: CPT | Performed by: ORTHOPAEDIC SURGERY

## 2021-07-08 PROCEDURE — 99213 OFFICE O/P EST LOW 20 MIN: CPT | Performed by: ORTHOPAEDIC SURGERY

## 2021-07-08 PROCEDURE — 73562 X-RAY EXAM OF KNEE 3: CPT

## 2021-07-08 RX ORDER — PSEUDOEPHEDRINE HCL 120 MG/1
120 TABLET, FILM COATED, EXTENDED RELEASE ORAL AS NEEDED
COMMUNITY

## 2021-07-08 RX ORDER — ERGOCALCIFEROL (VITAMIN D2) 1250 MCG
50000 CAPSULE ORAL
Status: ON HOLD | COMMUNITY
Start: 2021-05-20 | End: 2022-05-16 | Stop reason: ALTCHOICE

## 2021-07-08 RX ORDER — ERGOCALCIFEROL 1.25 MG/1
CAPSULE ORAL
Status: ON HOLD | COMMUNITY
Start: 2021-05-20 | End: 2022-05-16 | Stop reason: ALTCHOICE

## 2021-07-08 NOTE — PROGRESS NOTES
ASSESSMENT/PLAN:    Diagnoses and all orders for this visit:    History of total knee replacement, left  -     XR knee 3 vw left non injury; Future    Other orders  -     ergocalciferol (VITAMIN D2) 50,000 units; TAKE 1 CAPSULE BY MOUTH ONE TIME PER WEEK  -     ergocalciferol (ERGOCALCIFEROL) 1 25 MG (19077 UT) capsule; Take 50,000 Units by mouth  -     pseudoephedrine (SUDAFED) 120 MG 12 hr tablet; Take 120 mg by mouth every 12 (twelve) hours         x-rays of the patient's left knee show well-seated left total knee replacement  The prosthesis is in good alignment  There are no signs of loosening  There are no fractures or dislocations  Overall, the patient is continuing to do well since surgery  He should continue a home exercise program for strengthening, stretching and range of motion  He will follow up with our office in 6 months  We will get new x-rays of both knees three views each at that time  The patient is acceptable to this plan  Return in about 6 months (around 1/8/2022)  _____________________________________________________  CHIEF COMPLAINT:  Chief Complaint   Patient presents with    Left Knee - Follow-up         SUBJECTIVE:  Heron Gonzales is a 77 y o  male  Status post left total knee replacement from 01/13/2021  The patient presents to our office for 6 month follow-up visit  He is ambulating without an antalgic gait or an assist device  He is very pleased with the results since surgery  He denies any numbness or tingling  He denies any fever or chills  He denies any significant pain       The following portions of the patient's history were reviewed and updated as appropriate: allergies, current medications, past family history, past medical history, past social history, past surgical history and problem list     PAST MEDICAL HISTORY:  Past Medical History:   Diagnosis Date    Arthritis     Rotator cuff injury     left shoulder       PAST SURGICAL HISTORY:  Past Surgical History:   Procedure Laterality Date    JOINT REPLACEMENT Right 2018    MULTIPLE TOOTH EXTRACTIONS      in office under local    [place] TOTAL KNEE ARTHROPLASTY Right 2/26/2019    Procedure: ARTHROPLASTY KNEE TOTAL;  Surgeon: Brad Alcantar DO;  Location: [address] [place] [address] MAIN OR;  Service: Orthopedics    LA TOTAL KNEE ARTHROPLASTY Left 1/13/2021    Procedure: KNEE TOTAL ARTHROPLASTY;  Surgeon: Brad Alcantar DO;  Location: [address] [place] [address] MAIN OR;  Service: Orthopedics    TONSILLECTOMY         FAMILY HISTORY:  Family History   Problem Relation Age of Onset    No Known Problems Mother     Ulcerative colitis Father     Ulcers Father     No Known Problems Sister     No Known Problems Brother        SOCIAL HISTORY:  Social History     Tobacco Use    Smoking status: Light Tobacco Smoker     Packs/day: 0 25     Years: 51 00     Pack years: 12 75     Types: Cigarettes    Smokeless tobacco: Current User    Tobacco comment: 2 packs a week   Vaping Use    Vaping Use: Never used   Substance Use Topics    Alcohol use:  Yes     Alcohol/week: 4 0 standard drinks     Types: 3 Cans of beer, 1 Shots of liquor per week     Comment: 4-6 drinks a week    Drug use: Never       MEDICATIONS:    Current Outpatient Medications:     ergocalciferol (ERGOCALCIFEROL) 1 25 MG (46973 UT) capsule, Take 50,000 Units by mouth, Disp: , Rfl:     ascorbic acid (VITAMIN C) 500 MG tablet, Take 1 tablet (500 mg total) by mouth 2 (two) times a day (Patient taking differently: Take 500 mg by mouth daily ), Disp: 60 tablet, Rfl: 1    aspirin (ECOTRIN) 325 mg EC tablet, Take 1 tablet (325 mg total) by mouth 2 (two) times a day (Patient not taking: Reported on 4/8/2021), Disp: 30 tablet, Rfl: 0    B COMPLEX-C-FOLIC ACID PO, Take 1 tablet by mouth daily, Disp: , Rfl:     docusate sodium (COLACE) 100 mg capsule, Take 1 capsule (100 mg total) by mouth 2 (two) times a day (Patient not taking: Reported on 4/8/2021), Disp: 10 capsule, Rfl: 0    ergocalciferol (VITAMIN D2) 50,000 units, TAKE 1 CAPSULE BY MOUTH ONE TIME PER WEEK, Disp: , Rfl:     ferrous sulfate 324 (65 Fe) mg, Take 1 tablet (324 mg total) by mouth 2 (two) times a day before meals (Patient not taking: Reported on 4/8/2021), Disp: , Rfl: 0    folic acid (FOLVITE) 1 mg tablet, Take 1 tablet (1 mg total) by mouth daily (Patient not taking: Reported on 4/8/2021), Disp: 30 tablet, Rfl: 0    Multiple Vitamins-Minerals (multivitamin with minerals) tablet, Take 1 tablet by mouth daily, Disp: 30 tablet, Rfl: 1    pseudoephedrine (SUDAFED) 120 MG 12 hr tablet, Take 120 mg by mouth every 12 (twelve) hours, Disp: , Rfl:     ALLERGIES:  No Known Allergies    ROS:  Review of Systems     Constitutional: Negative for fatigue, fever or loss of appetite  HENT: Negative  Respiratory: Negative for shortness of breath, dyspnea  Cardiovascular: Negative for chest pain/tightness  Gastrointestinal: Negative for abdominal pain, N/V  Endocrine: Negative for cold/heat intolerance, unexplained weight loss/gain  Genitourinary: Negative for flank pain, dysuria, hematuria  Musculoskeletal: Positive for arthralgia   Skin: Negative for rash  Neurological: Negative for numbness or tingling  Psychiatric/Behavioral: Negative for agitation  _____________________________________________________  PHYSICAL EXAMINATION:    Blood pressure 155/81, pulse 77, height 5' 6" (1 676 m), weight 88 5 kg (195 lb)  Constitutional: Oriented to person, place, and time  Appears well-developed and well-nourished  No distress  HENT:   Head: Normocephalic  Eyes: Conjunctivae are normal  Right eye exhibits no discharge  Left eye exhibits no discharge  No scleral icterus  Cardiovascular: Normal rate  Pulmonary/Chest: Effort normal    Neurological: Alert and oriented to person, place, and time  Skin: Skin is warm and dry  No rash noted  Not diaphoretic  No erythema  No pallor  Psychiatric: Normal mood and affect   Behavior is normal  Judgment and thought content normal       MUSCULOSKELETAL EXAMINATION:   Physical Exam  Ortho Exam      Left lower extremity is neurovascularly intact   Toes are pink and mobile   Compartments are soft   Range of motion of the knee is from 0-120 degrees   No ligament laxity   Incisions are well healed   Brisk cap refill   Sensation intact     Objective:  BP Readings from Last 1 Encounters:   07/08/21 155/81      Wt Readings from Last 1 Encounters:   07/08/21 88 5 kg (195 lb)        BMI:   Estimated body mass index is 31 47 kg/m² as calculated from the following:    Height as of this encounter: 5' 6" (1 676 m)  Weight as of this encounter: 88 5 kg (195 lb)  Radiographs:  _____________________________________________________  STUDIES REVIEWED:  I have personally reviewed pertinent films and reports in PACS  X-rays of the patient's left knee show well-seated left total knee replacement  The prosthesis is in good alignment  There are no signs of loosening  There are no fractures or dislocations          Cricket Mosley PA-C

## 2022-01-13 ENCOUNTER — OFFICE VISIT (OUTPATIENT)
Dept: OBGYN CLINIC | Facility: CLINIC | Age: 67
End: 2022-01-13
Payer: MEDICARE

## 2022-01-13 ENCOUNTER — APPOINTMENT (OUTPATIENT)
Dept: RADIOLOGY | Facility: MEDICAL CENTER | Age: 67
End: 2022-01-13
Payer: MEDICARE

## 2022-01-13 VITALS
DIASTOLIC BLOOD PRESSURE: 73 MMHG | WEIGHT: 195 LBS | BODY MASS INDEX: 31.34 KG/M2 | SYSTOLIC BLOOD PRESSURE: 132 MMHG | HEIGHT: 66 IN | HEART RATE: 86 BPM

## 2022-01-13 DIAGNOSIS — Z96.653 STATUS POST TOTAL BILATERAL KNEE REPLACEMENT: ICD-10-CM

## 2022-01-13 DIAGNOSIS — Z96.652 S/P TKR (TOTAL KNEE REPLACEMENT) USING CEMENT, LEFT: Primary | ICD-10-CM

## 2022-01-13 DIAGNOSIS — Z96.651 STATUS POST TOTAL RIGHT KNEE REPLACEMENT: ICD-10-CM

## 2022-01-13 PROCEDURE — 73562 X-RAY EXAM OF KNEE 3: CPT

## 2022-01-13 PROCEDURE — 99213 OFFICE O/P EST LOW 20 MIN: CPT | Performed by: ORTHOPAEDIC SURGERY

## 2022-01-13 NOTE — PROGRESS NOTES
Assessment/Plan:  Assessment/Plan   Diagnoses and all orders for this visit:    S/P TKR (total knee replacement) using cement, left  Comments:  1/13/2021    Status post total right knee replacement  Comments:  2/26/2019  Orders:  -     XR knee 3 vw right non injury; Future  -     XR knee 3 vw left non injury; Future    Reviewed today's physical exam findings and x-ray findings with patient at time of visit  Physical exam demonstrates stable bilateral knee replacements  He can continue activities as tolerated without limitations or restrictions  She had anything change, he will contact the office to schedule appointment for re-evaluation  Otherwise he will be seen for regular annual exam and 1 year  Patient expresses understanding and is in agreement with this treatment plan  The patient is doing quite well from his bilateral total knee replacements  Physical exam shows full strength full motion  No instability  No pain  No warmth or effusion  X-rays show a well-seated bilateral total knee replacements without any loosening  Continue home exercise program   Continue stretching  Follow up in 1 year for re-evaluation with new x-rays of bilateral knees-three views each  If his condition changes, he will not hesitate to let us know    Subjective:   Patient ID: Malini Winchester is a 74 y o  male  HPI    Patient presents for annual exam and x-rays s/p bilateral knee replacements  Left knee arthroplasty was performed 11/13/2021, right knee arthroplasty was performed 2/26/2019  On today's presentation, patient states that, aside from occasional stiffness after prolonged sitting, he has no complaints regarding his knees  Is able participate in activities as tolerated without symptom exacerbation  He denies any recent bruising, swelling, numbness, tingling, feelings of instability, or mechanical symptoms      The following portions of the patient's history were reviewed and updated as appropriate: allergies, current medications, past family history, past medical history, past social history, past surgical history and problem list     Past Medical History:   Diagnosis Date    Arthritis     Rotator cuff injury     left shoulder     Past Surgical History:   Procedure Laterality Date    JOINT REPLACEMENT Right 2018    MULTIPLE TOOTH EXTRACTIONS      in office under local   [facility] Right 2/26/2019    Procedure: ARTHROPLASTY KNEE TOTAL;  Surgeon: Yaneli Durán DO;  Location: [address] [place] [address] OR;  Service: Orthopedics    DE TOTAL KNEE ARTHROPLASTY Left 1/13/2021    Procedure: KNEE TOTAL ARTHROPLASTY;  Surgeon: Yaneli Durán DO;  Location: [address] [place] [address] OR;  Service: Orthopedics    TONSILLECTOMY       Family History   Problem Relation Age of Onset    No Known Problems Mother     Ulcerative colitis Father     Ulcers Father     No Known Problems Sister     No Known Problems Brother      Social History     Socioeconomic History    Marital status:      Spouse name: None    Number of children: None    Years of education: None    Highest education level: None   Occupational History    None   Tobacco Use    Smoking status: Light Tobacco Smoker     Packs/day: 0 25     Years: 51 00     Pack years: 12 75     Types: Cigarettes    Smokeless tobacco: Current User    Tobacco comment: 2 packs a week   Vaping Use    Vaping Use: Never used   Substance and Sexual Activity    Alcohol use:  Yes     Alcohol/week: 4 0 standard drinks     Types: 3 Cans of beer, 1 Shots of liquor per week     Comment: 4-6 drinks a week    Drug use: Never    Sexual activity: Not Currently   Other Topics Concern    None   Social History Narrative    None     Social Determinants of Health     Financial Resource Strain: Not on file   Food Insecurity: Not on file   Transportation Needs: Not on file   Physical Activity: Not on file   Stress: Not on file   Social Connections: Not on file   Intimate Partner Violence: Not on file Housing Stability: Not on file       Current Outpatient Medications:     ascorbic acid (VITAMIN C) 500 MG tablet, Take 1 tablet (500 mg total) by mouth 2 (two) times a day (Patient taking differently: Take 500 mg by mouth daily ), Disp: 60 tablet, Rfl: 1    aspirin (ECOTRIN) 325 mg EC tablet, Take 1 tablet (325 mg total) by mouth 2 (two) times a day (Patient not taking: Reported on 4/8/2021), Disp: 30 tablet, Rfl: 0    B COMPLEX-C-FOLIC ACID PO, Take 1 tablet by mouth daily (Patient not taking: Reported on 1/13/2022 ), Disp: , Rfl:     docusate sodium (COLACE) 100 mg capsule, Take 1 capsule (100 mg total) by mouth 2 (two) times a day (Patient not taking: Reported on 4/8/2021), Disp: 10 capsule, Rfl: 0    ergocalciferol (ERGOCALCIFEROL) 1 25 MG (56234 UT) capsule, Take 50,000 Units by mouth, Disp: , Rfl:     ergocalciferol (VITAMIN D2) 50,000 units, TAKE 1 CAPSULE BY MOUTH ONE TIME PER WEEK (Patient not taking: Reported on 1/13/2022), Disp: , Rfl:     ferrous sulfate 324 (65 Fe) mg, Take 1 tablet (324 mg total) by mouth 2 (two) times a day before meals (Patient not taking: Reported on 4/8/2021), Disp: , Rfl: 0    folic acid (FOLVITE) 1 mg tablet, Take 1 tablet (1 mg total) by mouth daily (Patient not taking: Reported on 4/8/2021), Disp: 30 tablet, Rfl: 0    Multiple Vitamins-Minerals (multivitamin with minerals) tablet, Take 1 tablet by mouth daily, Disp: 30 tablet, Rfl: 1    pseudoephedrine (SUDAFED) 120 MG 12 hr tablet, Take 120 mg by mouth every 12 (twelve) hours (Patient not taking: Reported on 1/13/2022 ), Disp: , Rfl:     No Known Allergies      Review of Systems   Constitutional: Negative for chills, fever and unexpected weight change  HENT: Negative for hearing loss, nosebleeds and sore throat  Eyes: Negative for pain, redness and visual disturbance  Respiratory: Negative for cough, shortness of breath and wheezing  Cardiovascular: Negative for chest pain, palpitations and leg swelling  Gastrointestinal: Negative for abdominal pain, nausea and vomiting  Endocrine: Negative for polydipsia and polyuria  Genitourinary: Negative for dysuria and hematuria  Musculoskeletal:        As noted in HPI   Skin: Negative for rash and wound  Neurological: Negative for dizziness, numbness and headaches  Psychiatric/Behavioral: Negative for decreased concentration and suicidal ideas  The patient is not nervous/anxious  Objective:  /73   Pulse 86   Ht 5' 6" (1 676 m)   Wt 88 5 kg (195 lb)   BMI 31 47 kg/m²     Ortho Exam   Bilateral knees -   Weight-bearing status:  Full WBAT  Incision(s):  Well-healed  Skin is warm and dry with no signs of erythema, ecchymosis, or infection  No soft tissue swelling or effusion  ROM:  0°-125°  Strength:  5/5 throughout  Knees are stable to varus and valgus stress  Calf compartments are soft  Negative Natali's sign  2+ TP and DP pulses with brisk capillary refill to the toes  Sural, saphenous, tibial, superficial and deep peroneal motor and sensory distributions intact  Sensation light touch intact distally    Physical Exam  Constitutional:       Appearance: He is well-developed  HENT:      Right Ear: External ear normal       Left Ear: External ear normal       Nose: Nose normal    Eyes:      Conjunctiva/sclera: Conjunctivae normal       Pupils: Pupils are equal, round, and reactive to light  Pulmonary:      Effort: Pulmonary effort is normal    Musculoskeletal:      Cervical back: Normal range of motion  Comments:  As noted in HPI   Skin:     General: Skin is warm and dry  Neurological:      Mental Status: He is alert and oriented to person, place, and time  Psychiatric:         Behavior: Behavior normal          Thought Content:  Thought content normal          Judgment: Judgment normal        Imaging:  Attending Physician has personally reviewed pertinent imaging in PACS, impression is as follows:    Review of radiographic series taken 1/13/2022 of the bilateral knees show well-seated total knee prosthesis maintained anatomical alignment and no signs of loosening    Scribe Attestation    I,:  Lloyd Lee am acting as a scribe while in the presence of the attending physician :       I,:  Morgan Franco, DO personally performed the services described in this documentation    as scribed in my presence :

## 2022-03-01 ENCOUNTER — APPOINTMENT (OUTPATIENT)
Dept: RADIOLOGY | Facility: CLINIC | Age: 67
End: 2022-03-01
Payer: MEDICARE

## 2022-03-01 ENCOUNTER — OFFICE VISIT (OUTPATIENT)
Dept: OBGYN CLINIC | Facility: CLINIC | Age: 67
End: 2022-03-01
Payer: MEDICARE

## 2022-03-01 VITALS
HEART RATE: 122 BPM | HEIGHT: 66 IN | WEIGHT: 195 LBS | BODY MASS INDEX: 31.34 KG/M2 | DIASTOLIC BLOOD PRESSURE: 83 MMHG | SYSTOLIC BLOOD PRESSURE: 131 MMHG

## 2022-03-01 DIAGNOSIS — M25.512 LEFT SHOULDER PAIN, UNSPECIFIED CHRONICITY: Primary | ICD-10-CM

## 2022-03-01 DIAGNOSIS — M25.512 LEFT SHOULDER PAIN, UNSPECIFIED CHRONICITY: ICD-10-CM

## 2022-03-01 DIAGNOSIS — M75.122 COMPLETE TEAR OF LEFT ROTATOR CUFF, UNSPECIFIED WHETHER TRAUMATIC: ICD-10-CM

## 2022-03-01 PROCEDURE — 73030 X-RAY EXAM OF SHOULDER: CPT

## 2022-03-01 PROCEDURE — 99213 OFFICE O/P EST LOW 20 MIN: CPT | Performed by: ORTHOPAEDIC SURGERY

## 2022-03-01 PROCEDURE — 20610 DRAIN/INJ JOINT/BURSA W/O US: CPT | Performed by: ORTHOPAEDIC SURGERY

## 2022-03-01 RX ORDER — TRIAMCINOLONE ACETONIDE 40 MG/ML
40 INJECTION, SUSPENSION INTRA-ARTICULAR; INTRAMUSCULAR
Status: COMPLETED | OUTPATIENT
Start: 2022-03-01 | End: 2022-03-01

## 2022-03-01 RX ORDER — BUPIVACAINE HYDROCHLORIDE 2.5 MG/ML
4 INJECTION, SOLUTION INFILTRATION; PERINEURAL
Status: COMPLETED | OUTPATIENT
Start: 2022-03-01 | End: 2022-03-01

## 2022-03-01 RX ADMIN — TRIAMCINOLONE ACETONIDE 40 MG: 40 INJECTION, SUSPENSION INTRA-ARTICULAR; INTRAMUSCULAR at 14:10

## 2022-03-01 RX ADMIN — BUPIVACAINE HYDROCHLORIDE 4 ML: 2.5 INJECTION, SOLUTION INFILTRATION; PERINEURAL at 14:10

## 2022-03-01 NOTE — PROGRESS NOTES
Assessment/Plan:   Diagnoses and all orders for this visit:    Left shoulder pain, unspecified chronicity  -     XR shoulder 2+ vw left; Future  -     MRI shoulder left wo contrast; Future    Complete tear of left rotator cuff, unspecified whether traumatic  -     MRI shoulder left wo contrast; Future    Discussed with patient that given today's lack of function on physical exam, and previous history of rotator cuff tear with return of function following conservative management, there is concern for potential exacerbation of previous injury  He is being provided a referral for MRI of the left shoulder for further evaluation of the rotator cuff  He was provided a Kenalog and Marcaine injection today for relief of pain and inflammation  Patient tolerated treatment well  He will be seen for follow-up after his MRI is complete  Can continue NSAIDs/Tylenol, ice, and activities as tolerated  Patient expresses understanding is in agreement with this treatment plan  The patient has a history of a tear of the rotator cuff of his right shoulder which was treated conservatively  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  Physical examination shows limited range of motion with flexion and abduction past 65°  A positive drop-arm test was present  X-rays were negative  The shoulder was injected with Kenalog and Marcaine  He tolerated procedure quite well  An MRI was ordered to look for rotator cuff pathology  He returned back once this is complete  If there is any problems between now and the next visit, he will not hesitate to let us know    Subjective:   Patient ID: Rhonda Reyes  1955     HPI  Patient is a 74 y o  male who presents for evaluation of new issue of left shoulder pain x2 weeks  Patient states that he was helping a friend hang [employer], and reports that the day after he was unable to elevate his arm    Patient states that he has a history of rotator cuff tear that was treated non operatively in the past, and he did make a functional recovery with physical therapy to where he was able to perform overhead cast with strength  On today's presentation, he reports achy intense pain of the left shoulder, and almost complete loss of function  He denies any associated numbness or tingling  The following portions of the patient's history were reviewed and updated as appropriate:  Past medical history, past surgical history, Family history, social history, current medications and allergies    Past Medical History:   Diagnosis Date    Arthritis     Rotator cuff injury     left shoulder       Past Surgical History:   Procedure Laterality Date    JOINT REPLACEMENT Right 2018    MULTIPLE TOOTH EXTRACTIONS      in office under local   [facility] Right 2/26/2019    Procedure: ARTHROPLASTY KNEE TOTAL;  Surgeon: Rebekah Henderson DO;  Location: [address] [place] [address] OR;  Service: Orthopedics    [place] TOTAL KNEE ARTHROPLASTY Left 1/13/2021    Procedure: KNEE TOTAL ARTHROPLASTY;  Surgeon: Rebekah Henderson DO;  Location: [address] [place] [address] OR;  Service: Orthopedics    TONSILLECTOMY         Family History   Problem Relation Age of Onset    No Known Problems Mother     Ulcerative colitis Father     Ulcers Father     No Known Problems Sister     No Known Problems Brother        Social History     Socioeconomic History    Marital status:      Spouse name: None    Number of children: None    Years of education: None    Highest education level: None   Occupational History    None   Tobacco Use    Smoking status: Light Tobacco Smoker     Packs/day: 0 25     Years: 51 00     Pack years: 12 75     Types: Cigarettes    Smokeless tobacco: Current User    Tobacco comment: 2 packs a week   Vaping Use    Vaping Use: Never used   Substance and Sexual Activity    Alcohol use:  Yes     Alcohol/week: 4 0 standard drinks     Types: 3 Cans of beer, 1 Shots of liquor per week     Comment: 4-6 drinks a week  Drug use: Never    Sexual activity: Not Currently   Other Topics Concern    None   Social History Narrative    None     Social Determinants of Health     Financial Resource Strain: Not on file   Food Insecurity: Not on file   Transportation Needs: Not on file   Physical Activity: Not on file   Stress: Not on file   Social Connections: Not on file   Intimate Partner Violence: Not on file   Housing Stability: Not on file         Current Outpatient Medications:     aspirin (ECOTRIN) 325 mg EC tablet, Take 1 tablet (325 mg total) by mouth 2 (two) times a day, Disp: 30 tablet, Rfl: 0    B COMPLEX-C-FOLIC ACID PO, Take 1 tablet by mouth daily  , Disp: , Rfl:     docusate sodium (COLACE) 100 mg capsule, Take 1 capsule (100 mg total) by mouth 2 (two) times a day, Disp: 10 capsule, Rfl: 0    ergocalciferol (VITAMIN D2) 50,000 units, TAKE 1 CAPSULE BY MOUTH ONE TIME PER WEEK, Disp: , Rfl:     ferrous sulfate 324 (65 Fe) mg, Take 1 tablet (324 mg total) by mouth 2 (two) times a day before meals, Disp: , Rfl: 0    folic acid (FOLVITE) 1 mg tablet, Take 1 tablet (1 mg total) by mouth daily, Disp: 30 tablet, Rfl: 0    pseudoephedrine (SUDAFED) 120 MG 12 hr tablet, Take 120 mg by mouth every 12 (twelve) hours  , Disp: , Rfl:     ascorbic acid (VITAMIN C) 500 MG tablet, Take 1 tablet (500 mg total) by mouth 2 (two) times a day (Patient taking differently: Take 500 mg by mouth daily ), Disp: 60 tablet, Rfl: 1    ergocalciferol (ERGOCALCIFEROL) 1 25 MG (81520 UT) capsule, Take 50,000 Units by mouth, Disp: , Rfl:     Multiple Vitamins-Minerals (multivitamin with minerals) tablet, Take 1 tablet by mouth daily, Disp: 30 tablet, Rfl: 1    Allergies   Allergen Reactions    Other Allergic Rhinitis     Mold Spores       Review of Systems   Constitutional: Negative for chills, fever and unexpected weight change  HENT: Negative for hearing loss, nosebleeds and sore throat      Eyes: Negative for pain, redness and visual disturbance  Respiratory: Negative for cough, shortness of breath and wheezing  Cardiovascular: Negative for chest pain, palpitations and leg swelling  Gastrointestinal: Negative for abdominal pain, nausea and vomiting  Endocrine: Negative for polydipsia and polyuria  Genitourinary: Negative for dysuria and hematuria  Musculoskeletal:        As noted in HPI   Skin: Negative for rash and wound  Neurological: Negative for dizziness, numbness and headaches  Psychiatric/Behavioral: Negative for decreased concentration and suicidal ideas  The patient is not nervous/anxious  Objective:  /83 (BP Location: Right arm, Patient Position: Sitting, Cuff Size: Standard)   Pulse (!) 122   Ht 5' 6" (1 676 m)   Wt 88 5 kg (195 lb)   BMI 31 47 kg/m²     Ortho Exam  Left shoulder -   No anatomical deformity  Skin is warm and dry to touch with no signs of erythema, ecchymosis, or infection  No soft tissue swelling or effusion noted  + palpable crepitus with passive motion  TTP over anterior acromion, TTP over subacromial sulcus, TTP over posterior capsule, TTP over proximal biceps tendon  ROM  FF 0°-20°, ABD 0°-20°, ER struggles to reach neutral  MMT 2+/5 forward flexion and elevation  - glenohumeral instability appreciated on exam  + empty can, + drop-arm, + belly press, + resisted external rotation  Demonstrates normal elbow, wrist, and finger motion  2+ distal radial pulse with brisk capillary refill to the fingers  Radial, median, and ulnar motor and sensory distributions intact  Sensation light touch intact distally      Physical Exam  Vitals reviewed  Constitutional:       Appearance: He is well-developed  HENT:      Head: Normocephalic and atraumatic  Nose: Nose normal    Eyes:      Conjunctiva/sclera: Conjunctivae normal    Cardiovascular:      Rate and Rhythm: Normal rate  Pulmonary:      Effort: Pulmonary effort is normal    Musculoskeletal:      Cervical back: Neck supple  Skin:     General: Skin is warm and dry  Capillary Refill: Capillary refill takes less than 2 seconds  Neurological:      Mental Status: He is alert and oriented to person, place, and time  Psychiatric:         Mood and Affect: Mood normal          Behavior: Behavior normal         Diagnostic Test Review:  Attending Physician has personally reviewed pertinent imaging in PACS, impression is as follows:    Review of radiographic series taken 3/1/2022 of the left shoulder shows no sign of acute osseous abnormality or malalignment    Large joint arthrocentesis: L subacromial bursa  Universal Protocol:  Consent: Verbal consent obtained  Risks and benefits: risks, benefits and alternatives were discussed  Consent given by: patient  Time out: Immediately prior to procedure a "time out" was called to verify the correct patient, procedure, equipment, support staff and site/side marked as required    Timeout called at: 3/1/2022 2:01 PM   Patient understanding: patient states understanding of the procedure being performed  Site marked: the operative site was marked  Patient identity confirmed: verbally with patient    Supporting Documentation  Indications: pain and joint swelling   Procedure Details  Location: shoulder - L subacromial bursa  Preparation: Patient was prepped and draped in the usual sterile fashion  Needle size: 22 G  Ultrasound guidance: no  Approach: lateral  Medications administered: 4 mL bupivacaine 0 25 %; 40 mg triamcinolone acetonide 40 mg/mL    Patient tolerance: patient tolerated the procedure well with no immediate complications  Dressing:  Sterile dressing applied         Scribe Attestation    I,:  Dennis Camacho am acting as a scribe while in the presence of the attending physician :       I,:  Addie Saucedo DO personally performed the services described in this documentation    as scribed in my presence :

## 2022-03-07 ENCOUNTER — HOSPITAL ENCOUNTER (OUTPATIENT)
Dept: MRI IMAGING | Facility: HOSPITAL | Age: 67
Discharge: HOME | End: 2022-03-07
Attending: ORTHOPAEDIC SURGERY
Payer: MEDICARE

## 2022-03-07 DIAGNOSIS — M25.512 LEFT SHOULDER PAIN, UNSPECIFIED CHRONICITY: ICD-10-CM

## 2022-03-07 DIAGNOSIS — M75.122 COMPLETE TEAR OF LEFT ROTATOR CUFF, UNSPECIFIED WHETHER TRAUMATIC: ICD-10-CM

## 2022-03-07 PROCEDURE — G1004 CDSM NDSC: HCPCS

## 2022-03-07 PROCEDURE — 73221 MRI JOINT UPR EXTREM W/O DYE: CPT

## 2022-03-15 ENCOUNTER — PREP FOR PROCEDURE (OUTPATIENT)
Dept: OBGYN CLINIC | Facility: CLINIC | Age: 67
End: 2022-03-15

## 2022-03-15 ENCOUNTER — OFFICE VISIT (OUTPATIENT)
Dept: OBGYN CLINIC | Facility: CLINIC | Age: 67
End: 2022-03-15
Payer: MEDICARE

## 2022-03-15 VITALS
BODY MASS INDEX: 31.34 KG/M2 | SYSTOLIC BLOOD PRESSURE: 159 MMHG | HEIGHT: 66 IN | DIASTOLIC BLOOD PRESSURE: 80 MMHG | WEIGHT: 195 LBS | HEART RATE: 72 BPM

## 2022-03-15 DIAGNOSIS — Z01.812 PRE-OPERATIVE LABORATORY EXAMINATION: ICD-10-CM

## 2022-03-15 DIAGNOSIS — M75.102 TEAR OF LEFT ROTATOR CUFF, UNSPECIFIED TEAR EXTENT, UNSPECIFIED WHETHER TRAUMATIC: Primary | ICD-10-CM

## 2022-03-15 PROCEDURE — 99213 OFFICE O/P EST LOW 20 MIN: CPT | Performed by: ORTHOPAEDIC SURGERY

## 2022-03-15 RX ORDER — CEFAZOLIN SODIUM 2 G/50ML
2000 SOLUTION INTRAVENOUS ONCE
Status: CANCELLED | OUTPATIENT
Start: 2022-05-16 | End: 2022-03-15

## 2022-03-15 RX ORDER — CHLORHEXIDINE GLUCONATE 4 G/100ML
SOLUTION TOPICAL DAILY PRN
Status: CANCELLED | OUTPATIENT
Start: 2022-05-16

## 2022-03-15 RX ORDER — CHLORHEXIDINE GLUCONATE 0.12 MG/ML
15 RINSE ORAL ONCE
Status: CANCELLED | OUTPATIENT
Start: 2022-05-16 | End: 2022-03-15

## 2022-03-15 NOTE — PROGRESS NOTES
ASSESSMENT/PLAN:    Diagnoses and all orders for this visit:    Tear of left rotator cuff, unspecified tear extent, unspecified whether traumatic        MRI of the patient's left shoulder consistent with high-grade tearing of the supraspinatus as well as complete tear of the infraspinatus  There are no fractures  The patient states that his symptoms are worsening and affecting his quality of life  Possible treatment options were discussed with the patient  After exhausting conservative treatment, the risks and benefits surgery discussed with the patient  He would like to proceed with an elective left shoulder arthroscopy with possible rotator cuff repair  His surgery is tentatively scheduled for May 16, 2022  The patient is acceptable to this plan  Patient was given treatment options  He has opted for elective arthroscopy of his left shoulder with possible rotator cuff repair  All risks, complications, benefits were discussed with the patient in great detail including bleeding, infection, blood clots, pain, stiffness, neurovascular damage, fractures, dislocations, the possibility of loss of life limb for surgery, inability to repair the rotator cuff based on tissue quality, rerupturing of the tendon, etcetera  His surgery is scheduled for May 16, 2022    _____________________________________________________  CHIEF COMPLAINT:  Chief Complaint   Patient presents with    Left Shoulder - Follow-up         SUBJECTIVE:  Kinsey Tijerina is a 74 y o  male who presents to our office to review MRI results of his left shoulder  Last visit, he was given a corticosteroid injection  He states that his pain and range of motion have only slightly improved  He denies any numbness or tingling  He denies any fever or chills        The following portions of the patient's history were reviewed and updated as appropriate: allergies, current medications, past family history, past medical history, past social history, past surgical history and problem list     PAST MEDICAL HISTORY:  Past Medical History:   Diagnosis Date    Arthritis     Rotator cuff injury     left shoulder       PAST SURGICAL HISTORY:  Past Surgical History:   Procedure Laterality Date    JOINT REPLACEMENT Right 2018    MULTIPLE TOOTH EXTRACTIONS      in office under local    [place] TOTAL KNEE ARTHROPLASTY Right 2/26/2019    Procedure: ARTHROPLASTY KNEE TOTAL;  Surgeon: Beauford Brittle, DO;  [facility]: [facility] MAIN OR;  Service: Orthopedics    [place] TOTAL KNEE ARTHROPLASTY Left 1/13/2021    Procedure: KNEE TOTAL ARTHROPLASTY;  Surgeon: Beauford Brittle, DO;  Location: [facility] MAIN OR;  Service: Orthopedics    TONSILLECTOMY         FAMILY HISTORY:  Family History   Problem Relation Age of Onset    No Known Problems Mother     Ulcerative colitis Father     Ulcers Father     No Known Problems Sister     No Known Problems Brother        SOCIAL HISTORY:  Social History     Tobacco Use    Smoking status: Light Tobacco Smoker     Packs/day: 0 25     Years: 51 00     Pack years: 12 75     Types: Cigarettes    Smokeless tobacco: Current User    Tobacco comment: 2 packs a week   Vaping Use    Vaping Use: Never used   Substance Use Topics    Alcohol use:  Yes     Alcohol/week: 4 0 standard drinks     Types: 3 Cans of beer, 1 Shots of liquor per week     Comment: 4-6 drinks a week    Drug use: Never       MEDICATIONS:    Current Outpatient Medications:     aspirin (ECOTRIN) 325 mg EC tablet, Take 1 tablet (325 mg total) by mouth 2 (two) times a day, Disp: 30 tablet, Rfl: 0    B COMPLEX-C-FOLIC ACID PO, Take 1 tablet by mouth daily  , Disp: , Rfl:     docusate sodium (COLACE) 100 mg capsule, Take 1 capsule (100 mg total) by mouth 2 (two) times a day, Disp: 10 capsule, Rfl: 0    ergocalciferol (VITAMIN D2) 50,000 units, TAKE 1 CAPSULE BY MOUTH ONE TIME PER WEEK, Disp: , Rfl:     ferrous sulfate 324 (65 Fe) mg, Take 1 tablet (324 mg total) by mouth 2 (two) times a day before meals, Disp: , Rfl: 0    folic acid (FOLVITE) 1 mg tablet, Take 1 tablet (1 mg total) by mouth daily, Disp: 30 tablet, Rfl: 0    pseudoephedrine (SUDAFED) 120 MG 12 hr tablet, Take 120 mg by mouth every 12 (twelve) hours  , Disp: , Rfl:     ascorbic acid (VITAMIN C) 500 MG tablet, Take 1 tablet (500 mg total) by mouth 2 (two) times a day (Patient taking differently: Take 500 mg by mouth daily ), Disp: 60 tablet, Rfl: 1    ergocalciferol (ERGOCALCIFEROL) 1 25 MG (68147 UT) capsule, Take 50,000 Units by mouth, Disp: , Rfl:     Multiple Vitamins-Minerals (multivitamin with minerals) tablet, Take 1 tablet by mouth daily, Disp: 30 tablet, Rfl: 1    ALLERGIES:  Allergies   Allergen Reactions    Other Allergic Rhinitis     Mold Spores       ROS:  Review of Systems     Constitutional: Negative for fatigue, fever or loss of appetite  HENT: Negative  Respiratory: Negative for shortness of breath, dyspnea  Cardiovascular: Negative for chest pain/tightness  Gastrointestinal: Negative for abdominal pain, N/V  Endocrine: Negative for cold/heat intolerance, unexplained weight loss/gain  Genitourinary: Negative for flank pain, dysuria, hematuria  Musculoskeletal: Positive for arthralgia   Skin: Negative for rash  Neurological: Negative for numbness or tingling  Psychiatric/Behavioral: Negative for agitation  _____________________________________________________  PHYSICAL EXAMINATION:    Blood pressure 159/80, pulse 72, height 5' 6" (1 676 m), weight 88 5 kg (195 lb)  Constitutional: Oriented to person, place, and time  Appears well-developed and well-nourished  No distress  HENT:   Head: Normocephalic  Eyes: Conjunctivae are normal  Right eye exhibits no discharge  Left eye exhibits no discharge  No scleral icterus  Cardiovascular: Normal rate  Pulmonary/Chest: Effort normal    Neurological: Alert and oriented to person, place, and time  Skin: Skin is warm and dry  No rash noted   Not diaphoretic  No erythema  No pallor  Psychiatric: Normal mood and affect  Behavior is normal  Judgment and thought content normal       MUSCULOSKELETAL EXAMINATION:   Physical Exam  Ortho Exam    Left upper extremity is neurovascularly intact  Fingers are pink and mobile  Compartments are soft  Range of motion of the shoulder is from 0-140 degrees of forward flexion and abduction  Rotator cuff strength 3 5/5  Mild signs of impingement  Brisk cap refill  Sensation intact  Objective:  BP Readings from Last 1 Encounters:   03/15/22 159/80      Wt Readings from Last 1 Encounters:   03/15/22 88 5 kg (195 lb)        BMI:   Estimated body mass index is 31 47 kg/m² as calculated from the following:    Height as of this encounter: 5' 6" (1 676 m)  Weight as of this encounter: 88 5 kg (195 lb)      Ilana Genao PA-C

## 2022-03-16 ENCOUNTER — PREP FOR PROCEDURE (OUTPATIENT)
Dept: OBGYN CLINIC | Facility: CLINIC | Age: 67
End: 2022-03-16

## 2022-04-11 ENCOUNTER — TELEPHONE (OUTPATIENT)
Dept: OBGYN CLINIC | Facility: CLINIC | Age: 67
End: 2022-04-11

## 2022-04-11 ENCOUNTER — TELEPHONE (OUTPATIENT)
Dept: OBGYN CLINIC | Facility: OTHER | Age: 67
End: 2022-04-11

## 2022-04-11 NOTE — TELEPHONE ENCOUNTER
Spoke with patient and stated that he is able to go to a [facility] facility and obtain his EKG there prior to surgery

## 2022-04-11 NOTE — TELEPHONE ENCOUNTER
Patient called in , he is wanting to know where to get his EKG completed prior to Surgery      C/b # [phone]

## 2022-04-12 ENCOUNTER — PREP FOR PROCEDURE (OUTPATIENT)
Dept: OBGYN CLINIC | Facility: CLINIC | Age: 67
End: 2022-04-12

## 2022-05-12 RX ORDER — IBUPROFEN 600 MG/1
TABLET ORAL EVERY 6 HOURS PRN
COMMUNITY

## 2022-05-12 NOTE — PRE-PROCEDURE INSTRUCTIONS
Pre-Surgery Instructions:   Medication Instructions    ascorbic acid (VITAMIN C) 500 MG tablet Stop taking 5 days prior to surgery   ibuprofen (MOTRIN) 600 mg tablet Stop taking 3 days prior to surgery   pseudoephedrine (SUDAFED) 120 MG 12 hr tablet Uses PRN- OK to take day of surgery   Pt was instructed to stop vitamin and to not start any new vitamins/supplements, take any aspirin or NSAIDS until after surgery  Pt instructed that tylenol is okay to use prn pain  Pt verb understanding

## 2022-05-13 ENCOUNTER — PREP FOR PROCEDURE (OUTPATIENT)
Dept: OBGYN CLINIC | Facility: CLINIC | Age: 67
End: 2022-05-13

## 2022-05-16 ENCOUNTER — HOSPITAL ENCOUNTER (OUTPATIENT)
Facility: HOSPITAL | Age: 67
Setting detail: OUTPATIENT SURGERY
Discharge: HOME | End: 2022-05-16
Attending: ORTHOPAEDIC SURGERY | Admitting: ORTHOPAEDIC SURGERY
Payer: MEDICARE

## 2022-05-16 ENCOUNTER — ANESTHESIA (OUTPATIENT)
Dept: PERIOP | Facility: HOSPITAL | Age: 67
End: 2022-05-16
Payer: MEDICARE

## 2022-05-16 ENCOUNTER — ANESTHESIA EVENT (OUTPATIENT)
Dept: PERIOP | Facility: HOSPITAL | Age: 67
End: 2022-05-16
Payer: MEDICARE

## 2022-05-16 VITALS
RESPIRATION RATE: 16 BRPM | WEIGHT: 195 LBS | TEMPERATURE: 98.5 F | BODY MASS INDEX: 31.34 KG/M2 | HEART RATE: 66 BPM | OXYGEN SATURATION: 97 % | HEIGHT: 66 IN | SYSTOLIC BLOOD PRESSURE: 152 MMHG | DIASTOLIC BLOOD PRESSURE: 91 MMHG

## 2022-05-16 DIAGNOSIS — M75.102 TEAR OF LEFT ROTATOR CUFF, UNSPECIFIED TEAR EXTENT, UNSPECIFIED WHETHER TRAUMATIC: ICD-10-CM

## 2022-05-16 PROCEDURE — 29826 SHO ARTHRS SRG DECOMPRESSION: CPT | Performed by: PHYSICIAN ASSISTANT

## 2022-05-16 PROCEDURE — 29826 SHO ARTHRS SRG DECOMPRESSION: CPT | Performed by: ORTHOPAEDIC SURGERY

## 2022-05-16 PROCEDURE — C9290 INJ, BUPIVACAINE LIPOSOME: HCPCS | Performed by: ANESTHESIOLOGY

## 2022-05-16 PROCEDURE — 29827 SHO ARTHRS SRG RT8TR CUF RPR: CPT | Performed by: PHYSICIAN ASSISTANT

## 2022-05-16 PROCEDURE — 29827 SHO ARTHRS SRG RT8TR CUF RPR: CPT | Performed by: ORTHOPAEDIC SURGERY

## 2022-05-16 PROCEDURE — 88311 DECALCIFY TISSUE: CPT | Performed by: PATHOLOGY

## 2022-05-16 PROCEDURE — 88304 TISSUE EXAM BY PATHOLOGIST: CPT | Performed by: PATHOLOGY

## 2022-05-16 PROCEDURE — C1713 ANCHOR/SCREW BN/BN,TIS/BN: HCPCS | Performed by: ORTHOPAEDIC SURGERY

## 2022-05-16 PROCEDURE — 99024 POSTOP FOLLOW-UP VISIT: CPT | Performed by: ORTHOPAEDIC SURGERY

## 2022-05-16 DEVICE — BIO-COMP SWVLK C, CLD 4.75X19.1MM
Type: IMPLANTABLE DEVICE | Site: SHOULDER | Status: FUNCTIONAL
Brand: ARTHREX®

## 2022-05-16 RX ORDER — CEPHALEXIN 500 MG/1
500 CAPSULE ORAL EVERY 8 HOURS SCHEDULED
Qty: 9 CAPSULE | Refills: 0 | Status: SHIPPED | OUTPATIENT
Start: 2022-05-16 | End: 2022-05-19

## 2022-05-16 RX ORDER — BUPIVACAINE HYDROCHLORIDE 5 MG/ML
INJECTION, SOLUTION PERINEURAL
Status: COMPLETED | OUTPATIENT
Start: 2022-05-16 | End: 2022-05-16

## 2022-05-16 RX ORDER — MELOXICAM 15 MG/1
15 TABLET ORAL DAILY
Qty: 30 TABLET | Refills: 0 | Status: SHIPPED | OUTPATIENT
Start: 2022-05-16 | End: 2022-05-16

## 2022-05-16 RX ORDER — SODIUM CHLORIDE, SODIUM LACTATE, POTASSIUM CHLORIDE, CALCIUM CHLORIDE 600; 310; 30; 20 MG/100ML; MG/100ML; MG/100ML; MG/100ML
75 INJECTION, SOLUTION INTRAVENOUS CONTINUOUS
Status: ACTIVE | OUTPATIENT
Start: 2022-05-16 | End: 2022-05-16

## 2022-05-16 RX ORDER — OXYCODONE HYDROCHLORIDE AND ACETAMINOPHEN 5; 325 MG/1; MG/1
1 TABLET ORAL EVERY 4 HOURS PRN
Status: DISCONTINUED | OUTPATIENT
Start: 2022-05-16 | End: 2022-05-16 | Stop reason: HOSPADM

## 2022-05-16 RX ORDER — DEXAMETHASONE SODIUM PHOSPHATE 10 MG/ML
INJECTION, SOLUTION INTRAMUSCULAR; INTRAVENOUS AS NEEDED
Status: DISCONTINUED | OUTPATIENT
Start: 2022-05-16 | End: 2022-05-16

## 2022-05-16 RX ORDER — FENTANYL CITRATE 50 UG/ML
INJECTION, SOLUTION INTRAMUSCULAR; INTRAVENOUS AS NEEDED
Status: DISCONTINUED | OUTPATIENT
Start: 2022-05-16 | End: 2022-05-16

## 2022-05-16 RX ORDER — SODIUM CHLORIDE, SODIUM LACTATE, POTASSIUM CHLORIDE, CALCIUM CHLORIDE 600; 310; 30; 20 MG/100ML; MG/100ML; MG/100ML; MG/100ML
75 INJECTION, SOLUTION INTRAVENOUS CONTINUOUS
Status: DISCONTINUED | OUTPATIENT
Start: 2022-05-16 | End: 2022-05-16

## 2022-05-16 RX ORDER — POVIDONE-IODINE 10 MG/G
OINTMENT TOPICAL AS NEEDED
Status: DISCONTINUED | OUTPATIENT
Start: 2022-05-16 | End: 2022-05-16 | Stop reason: HOSPADM

## 2022-05-16 RX ORDER — MIDAZOLAM HYDROCHLORIDE 2 MG/2ML
INJECTION, SOLUTION INTRAMUSCULAR; INTRAVENOUS AS NEEDED
Status: DISCONTINUED | OUTPATIENT
Start: 2022-05-16 | End: 2022-05-16

## 2022-05-16 RX ORDER — GLYCOPYRROLATE 0.2 MG/ML
INJECTION INTRAMUSCULAR; INTRAVENOUS AS NEEDED
Status: DISCONTINUED | OUTPATIENT
Start: 2022-05-16 | End: 2022-05-16

## 2022-05-16 RX ORDER — PROPOFOL 10 MG/ML
INJECTION, EMULSION INTRAVENOUS AS NEEDED
Status: DISCONTINUED | OUTPATIENT
Start: 2022-05-16 | End: 2022-05-16

## 2022-05-16 RX ORDER — CEFAZOLIN SODIUM 2 G/50ML
2000 SOLUTION INTRAVENOUS ONCE
Status: COMPLETED | OUTPATIENT
Start: 2022-05-16 | End: 2022-05-16

## 2022-05-16 RX ORDER — FENTANYL CITRATE/PF 50 MCG/ML
25 SYRINGE (ML) INJECTION
Status: DISCONTINUED | OUTPATIENT
Start: 2022-05-16 | End: 2022-05-16 | Stop reason: HOSPADM

## 2022-05-16 RX ORDER — OXYCODONE HYDROCHLORIDE AND ACETAMINOPHEN 5; 325 MG/1; MG/1
1 TABLET ORAL EVERY 4 HOURS PRN
Qty: 30 TABLET | Refills: 0 | Status: SHIPPED | OUTPATIENT
Start: 2022-05-16 | End: 2022-05-26

## 2022-05-16 RX ORDER — BUPIVACAINE HYDROCHLORIDE AND EPINEPHRINE 5; 5 MG/ML; UG/ML
INJECTION, SOLUTION PERINEURAL AS NEEDED
Status: DISCONTINUED | OUTPATIENT
Start: 2022-05-16 | End: 2022-05-16 | Stop reason: HOSPADM

## 2022-05-16 RX ORDER — SUCCINYLCHOLINE/SOD CL,ISO/PF 100 MG/5ML
SYRINGE (ML) INTRAVENOUS AS NEEDED
Status: DISCONTINUED | OUTPATIENT
Start: 2022-05-16 | End: 2022-05-16

## 2022-05-16 RX ORDER — LIDOCAINE HYDROCHLORIDE 10 MG/ML
INJECTION, SOLUTION EPIDURAL; INFILTRATION; INTRACAUDAL; PERINEURAL AS NEEDED
Status: DISCONTINUED | OUTPATIENT
Start: 2022-05-16 | End: 2022-05-16

## 2022-05-16 RX ORDER — CHLORHEXIDINE GLUCONATE 4 G/100ML
SOLUTION TOPICAL DAILY PRN
Status: DISCONTINUED | OUTPATIENT
Start: 2022-05-16 | End: 2022-05-16 | Stop reason: HOSPADM

## 2022-05-16 RX ORDER — ONDANSETRON 2 MG/ML
INJECTION INTRAMUSCULAR; INTRAVENOUS AS NEEDED
Status: DISCONTINUED | OUTPATIENT
Start: 2022-05-16 | End: 2022-05-16

## 2022-05-16 RX ORDER — CHLORHEXIDINE GLUCONATE 0.12 MG/ML
15 RINSE ORAL ONCE
Status: COMPLETED | OUTPATIENT
Start: 2022-05-16 | End: 2022-05-16

## 2022-05-16 RX ORDER — ONDANSETRON 2 MG/ML
4 INJECTION INTRAMUSCULAR; INTRAVENOUS EVERY 6 HOURS PRN
Status: DISCONTINUED | OUTPATIENT
Start: 2022-05-16 | End: 2022-05-16 | Stop reason: HOSPADM

## 2022-05-16 RX ORDER — ONDANSETRON 2 MG/ML
4 INJECTION INTRAMUSCULAR; INTRAVENOUS EVERY 6 HOURS PRN
Status: CANCELLED | OUTPATIENT
Start: 2022-05-16

## 2022-05-16 RX ADMIN — ONDANSETRON 4 MG: 2 INJECTION INTRAMUSCULAR; INTRAVENOUS at 09:58

## 2022-05-16 RX ADMIN — PROPOFOL 160 MG: 10 INJECTION, EMULSION INTRAVENOUS at 09:30

## 2022-05-16 RX ADMIN — BUPIVACAINE 20 ML: 13.3 INJECTION, SUSPENSION, LIPOSOMAL INFILTRATION at 08:45

## 2022-05-16 RX ADMIN — FENTANYL CITRATE 50 MCG: 50 INJECTION, SOLUTION INTRAMUSCULAR; INTRAVENOUS at 09:30

## 2022-05-16 RX ADMIN — DEXAMETHASONE SODIUM PHOSPHATE 8 MG: 10 INJECTION INTRAMUSCULAR; INTRAVENOUS at 09:37

## 2022-05-16 RX ADMIN — MIDAZOLAM 2 MG: 1 INJECTION INTRAMUSCULAR; INTRAVENOUS at 08:44

## 2022-05-16 RX ADMIN — PROPOFOL 50 MG: 10 INJECTION, EMULSION INTRAVENOUS at 10:14

## 2022-05-16 RX ADMIN — BUPIVACAINE HYDROCHLORIDE 7 ML: 5 INJECTION, SOLUTION PERINEURAL at 08:45

## 2022-05-16 RX ADMIN — CHLORHEXIDINE GLUCONATE 0.12% ORAL RINSE 15 ML: 1.2 LIQUID ORAL at 08:13

## 2022-05-16 RX ADMIN — GLYCOPYRROLATE 0.2 MG: 0.2 INJECTION, SOLUTION INTRAMUSCULAR; INTRAVENOUS at 09:46

## 2022-05-16 RX ADMIN — PHENYLEPHRINE HYDROCHLORIDE 50 MCG/MIN: 10 INJECTION INTRAVENOUS at 09:42

## 2022-05-16 RX ADMIN — Medication 100 MG: at 09:30

## 2022-05-16 RX ADMIN — SODIUM CHLORIDE, SODIUM LACTATE, POTASSIUM CHLORIDE, AND CALCIUM CHLORIDE 75 ML/HR: .6; .31; .03; .02 INJECTION, SOLUTION INTRAVENOUS at 08:14

## 2022-05-16 RX ADMIN — LIDOCAINE HYDROCHLORIDE 40 MG: 10 INJECTION, SOLUTION EPIDURAL; INFILTRATION; INTRACAUDAL; PERINEURAL at 09:30

## 2022-05-16 RX ADMIN — CEFAZOLIN SODIUM 2000 MG: 2 SOLUTION INTRAVENOUS at 09:22

## 2022-05-16 RX ADMIN — FENTANYL CITRATE 50 MCG: 50 INJECTION, SOLUTION INTRAMUSCULAR; INTRAVENOUS at 10:14

## 2022-05-16 NOTE — ANESTHESIA PROCEDURE NOTES
Peripheral Block    Patient location during procedure: holding area  Start time: 5/16/2022 8:45 AM  Reason for block: at surgeon's request and post-op pain management  Staffing  Performed: Anesthesiologist   Anesthesiologist: Maurizio Shelton MD  Preanesthetic Checklist  Completed: patient identified, IV checked, site marked, risks and benefits discussed, surgical consent, monitors and equipment checked, pre-op evaluation and timeout performed  Peripheral Block  Patient position: sitting  Prep: ChloraPrep  Patient monitoring: continuous pulse ox and frequent blood pressure checks  Block type: interscalene  Laterality: left  Injection technique: single-shot  Procedures: ultrasound guided, Ultrasound guidance required for the procedure to increase accuracy and safety of medication placement and decrease risk of complications  Ultrasound permanent image savedbupivacaine (MARCAINE) 0 5 % - Perineural   7 mL - 5/16/2022 8:45:00 AM  Needle  Needle type: Stimuplex   Needle gauge: 20G  Needle length: 4in    Needle localization: ultrasound guidance  Test dose: negative  Assessment  Injection assessment: incremental injection, local visualized surrounding nerve on ultrasound, negative aspiration for heme and transient paresthesias  Paresthesia pain: immediately resolved  Heart rate change: no  Slow fractionated injection: yes  Post-procedure:  site cleaned  patient tolerated the procedure well with no immediate complications  Additional Notes  With Exparel 20 mL

## 2022-05-16 NOTE — H&P
H&P Exam - Orthopedics   Ena Holt 74 y o  male MRN: [id number]  Unit/Bed#: OR [place] Encounter: [id number]    Assessment/Plan     Assessment:  Rotator cuff tear of left shoulder  Plan:  Elective left shoulder arthroscopy with possible rotator cuff repair    History of Present Illness   HPI:  Ena Holt is a 74 y o  male who presented to our office complaining of worsening left shoulder pain with decreased range of motion strength  The patient stated that his symptoms were continuing to worsen  An MRI was performed which was consistent with a rotator cuff tear  After exhausting consent conservative treatment, the risks and benefits surgery discussed with the patient  He decided on an elective left shoulder arthroscopy with possible rotator cuff repair  Review of Systems   Constitutional: Negative for chills, fever and unexpected weight change  HENT: Negative for nosebleeds and sore throat  Eyes: Negative for pain, redness and visual disturbance  Respiratory: Negative for cough, shortness of breath and wheezing  Cardiovascular: Negative for chest pain, palpitations and leg swelling  Gastrointestinal: Negative for abdominal pain, nausea and vomiting  Endocrine: Negative for polydipsia and polyuria  Genitourinary: Negative for dysuria and hematuria  Musculoskeletal: Positive for arthralgias and joint swelling  As noted in HPI   Skin: Negative for rash and wound  Neurological: Negative for dizziness, numbness and headaches  Psychiatric/Behavioral: Negative for decreased concentration and suicidal ideas  The patient is not nervous/anxious          Historical Information   Past Medical History:   Diagnosis Date    Arthritis     Rotator cuff injury     left shoulder     Past Surgical History:   Procedure Laterality Date    COLOGUARD (HISTORICAL)      JOINT REPLACEMENT Right 2018    MULTIPLE TOOTH EXTRACTIONS      in office under local    [place] TOTAL KNEE ARTHROPLASTY Right 02/26/2019    Procedure: ARTHROPLASTY KNEE TOTAL;  Surgeon: Linda Maldonado DO;  Location: [facility] MAIN OR;  Service: Orthopedics    [place] TOTAL KNEE ARTHROPLASTY Left 01/13/2021    Procedure: KNEE TOTAL ARTHROPLASTY;  Surgeon: Linda Maldonado DO;  Location: [facility] MAIN OR;  Service: Orthopedics    TONSILLECTOMY       Social History   Social History     Substance and Sexual Activity   Alcohol Use Yes    Alcohol/week: 4 0 standard drinks    Types: 3 Cans of beer, 1 Shots of liquor per week    Comment: 4-6 drinks a week     Social History     Substance and Sexual Activity   Drug Use Never     Social History     Tobacco Use   Smoking Status Light Tobacco Smoker    Packs/day: 0 25    Years: 51 00    Pack years: 12 75    Types: Cigarettes   Smokeless Tobacco Current User   Tobacco Comment    one pack a week     Family History: non-contributory    Meds/Allergies   all medications and allergies reviewed  Allergies   Allergen Reactions    Other Allergic Rhinitis     Mold Spores       Objective   Vitals: There were no vitals taken for this visit  ,There is no height or weight on file to calculate BMI  No intake or output data in the 24 hours ending 05/16/22 0748    No intake/output data recorded  Invasive Devices  Report      Drain  Duration             Autologus Reinfusion/Drain Device 1 Left Knee 487 days                    Physical Exam  Ortho Exam   Neck is soft and supple  Negative axial compression test of the neck  Left upper extremity is neurovascularly intact  Fingers are pink and mobile  Compartments are soft  Range of motion of the shoulder was 95° of forward flexion and abduction and internal rotation to L5  Minimal signs of impingement with no instability  Glenohumeral crepitation is present  Rotator cuff strength is 4/5  Pulses intact  Lungs CTA  Heart RRR    Lab Results: I have personally reviewed pertinent lab results  Imaging: I have personally reviewed pertinent reports      EKG, Pathology, and Other Studies: I have personally reviewed pertinent reports  Code Status: No Order  Advance Directive and Living Will:      Power of :    POLST:      Counseling / Coordination of Care  Total floor / unit time spent today 30 minutes  Greater than 50% of total time was spent with the patient and / or family counseling and / or coordination of care

## 2022-05-16 NOTE — ANESTHESIA PREPROCEDURE EVALUATION
Procedure:  SHOULDER ARTHROSCOPY WITH POSSIBLE ROTATOR CUFF REPAIR (Left Shoulder)    Relevant Problems   /RENAL   (+) BPH (benign prostatic hyperplasia)      MUSCULOSKELETAL   (+) Primary osteoarthritis of left knee   (+) Primary osteoarthritis of right knee      Other   (+) S/P TKR (total knee replacement) using cement, left   (+) Tobacco abuse        Physical Exam    Airway    Mallampati score: II  TM Distance: >3 FB  Neck ROM: full     Dental   Comment: Edentulous,     Cardiovascular      Pulmonary      Other Findings        Anesthesia Plan  ASA Score- 2     Anesthesia Type- general with ASA Monitors  Additional Monitors:   Airway Plan: ETT  Comment: Discussed long acting interscalene nerve block for postoperative pain control with risks/benefits/alternatives  Patient made aware of possible postoperative shortness of breath related to transient hemidiaphragmatic paralysis  Discussed extremely low likelihood of transient or permanent nerve injury in the setting of ultrasound guidance and patient participation  Patient aware and would like to proceed          Plan Factors-Exercise tolerance (METS): >4 METS  Chart reviewed  Existing labs reviewed  Patient summary reviewed  Patient is a current smoker  Patient instructed to abstain from smoking on day of procedure  Patient did not smoke on day of surgery  Induction- intravenous  Postoperative Plan- Plan for postoperative opioid use  Planned trial extubation    Informed Consent- Anesthetic plan and risks discussed with patient  I personally reviewed this patient with the CRNA  Discussed and agreed on the Anesthesia Plan with the CRNA  Velvet Pac

## 2022-05-16 NOTE — OP NOTE
OPERATIVE REPORT  PATIENT NAME: Varun Andrea    :  1955  MRN: [id number]  Pt Location: [facility] OR ROOM 02    SURGERY DATE: 2022    Surgeon(s) and Role:     * Derek Ames DO - Primary    Assistant:  Nik Sal PA-C      Preop Diagnosis:  Tear of left rotator cuff, unspecified tear extent, unspecified whether traumatic [M75 102]    Post-Op Diagnosis Codes:     * Tear of left rotator cuff, unspecified tear extent, unspecified whether traumatic [M75 102]    Full-thickness rotator cuff tear left shoulder with calcific tendinitis  Tear of glenoid labrum  Synovitis  Impingement    Procedure(s) (LRB):  SHOULDER ARTHROSCOPY WITH POSSIBLE ROTATOR CUFF REPAIR (Left)     Left shoulder arthroscopy  Synovectomy  Debridement  Arthroscopic rotator cuff repair  Acromioplasty  Post arthroscopic injection of intra-articular joint space and peripheral portals    Specimen(s):  Shavings    Estimated Blood Loss:   Minimal    Drains:  Autologus Reinfusion/Drain Device 1 Left Knee (Active)   Number of days: 488   None    Anesthesia Type:   General w/ Interscalene Block    Operative Indications:  Tear of left rotator cuff, unspecified tear extent, unspecified whether traumatic [M75 102]      Operative Findings:  TT: 0    Complications:   None    Procedure and Technique:      The patient was properly identified and brought into the operating room suite  After successful induction of the general anesthetic, the patient was placed in the [facility] chair position  All areas of possible skin pressurization were well padded to avoid the above  The left upper extremity neck and torso region were then prepped and draped in the usual sterile fashion  It was medically necessary that the physician assistant be in the room to aid in positioning and placing the appropriate amount of retraction on the patient  A qualified resident was not available  The physician assistant's role was very integral to the success of this case  He assisted on positioning, draping, retraction soft tissue, dynamically maneuvering the arthroscope, assisted with implantation of the hardware, assisted with closure of complex wound, and assisted with placement of the immobilizer     The surgical landmarks were outlined with a skin marker  All the portals were infiltrated with 0 5% Marcaine with epinephrine  Using a #11  Scalpel blade a posterior portal was made approximately 1-1/2 cm medial and inferior to the posterior lateral corner of the acromion  The  arthroscope was then introduced into the glenohumeral joint space  There was a tremendous amount of synovial tissue present  Using a outside in technique an accessory anterior inferior portal was made and a synovectomy was performed  It should be noted that there is calcific synovitis present which was removed  There was a degenerative tear along the entire labrum which was debrided with a shaver  There was some grade 2 arthritic changes along the humeral head  The biceps tendon was probed and found to be intact  The subscapularis tendon was found to be intact  There was a l full-thickness tear of the supraspinatus tendon  This would be further evaluated from the superior viewing portal  At this point, the arthroscope was then withdrawn from the glenohumeral joint space and introduced into the subacromial joint space  An accessory lateral portal was made  A bursectomy was performed  Once this occurred, there was a full-thickness rotator cuff tear present with calcific tendinitis  It was confirmed with a grasper that there was adequate tissue quality  Using the Arthrex suture anchor system,  1 suture anchor was placed in a horizontal mattress fashion anatomic reducing the rotator cuff  At this point there was good tension on the cuff which was visualized using multiple arthroscopic portals  The soft tissue on the undersurface of the acromion was then removed    The coracoacromial ligament was then divided  At this point there was a large subacromial spur present  Using a motorized bur, an acromioplasty 1st occur with the bur in the lateral portal and then switched to posterior portal to move any spur that was remaining  At this point, there was adequate decompression of the subacromial joint space  The shoulder was then copiously irrigated with sterile arthroscopic solution  All the excess fluid was removed  The portals were then closed with 3-0 nylon  The subacromial joint space was injected with 0 5% Marcaine with epinephrine and dressed with ointment, Xeroform, 4x4s, ABDs, and tape  A shoulder immobilizer with an abductor pillow was placed  There was no complications during procedure  He was successfully woken, transferred to [facility], and went to recovery room in stable condition          I was present for the entire procedure, A qualified resident physician was not available and A physician assistant was required during the procedure for retraction tissue handling,dissection and suturing    Patient Disposition:  PACU       SIGNATURE: Guille Estrella DO  DATE: May 16, 2022  TIME: 8:32 AM

## 2022-05-16 NOTE — ANESTHESIA POSTPROCEDURE EVALUATION
Post-Op Assessment Note    CV Status:  Stable  Pain Score: 0    Pain management: adequate     Mental Status:  Alert   Hydration Status:  Stable   PONV Controlled:  None   Airway Patency:  Patent      Post Op Vitals Reviewed: Yes      Staff: CRNA         No complications documented      BP  140/75   Temp  96   Pulse  87   Resp   14   SpO2   96%

## 2022-05-16 NOTE — DISCHARGE INSTRUCTIONS
POST-OPERATIVE INFORMATION  ELBOW/SHOULDER SURGERY    Keep your arm in the sling as much as possible for the 1st 48 hours to minimize swelling  Keep your arm in the sling were brace at all times until seen in the office for follow-up  You may apply ice to your elbow or shoulder as instructed to control pain and swelling  You can and should exercise your fingers and wrist multiple times throughout the day  You can do this while remaining in the sling or brace  If you are in a shoulder immobilizer, you could remove the wrist strap to exercise her elbow  Do not lift your arm unless instructed by her physician  Your dressings may be changed after 2 days or if they become wet or dirty  You may remove the bandage after 3 days and cover the incisions with Band-Aids  You may shower after 2 days, but keep your incisions dry  If you are wearing a shoulder mobilizer remember to keep the operated arm against your body  You may gently slide your hand into wash and dry thoroughly  If you develop a rash in this underarm area, gently apply baby powder with your opposite hand  You should call the office if you have increasing pain not controlled by your medication, develop a fever, or experience increased drainage or redness around the incision site  If a post op appointment is not arranged for you prior to leaving the hospital, you should call the office the day after your surgery to make this appointment  You should start therapy in about 2 days after surgery or as previously scheduled

## 2022-05-16 NOTE — PROGRESS NOTES
Adron Sink at bedside to look at ecchmosis/swelling below dressing  Per Kiran Held, this is expected

## 2022-05-18 ENCOUNTER — EVALUATION (OUTPATIENT)
Dept: PHYSICAL THERAPY | Facility: CLINIC | Age: 67
End: 2022-05-18
Payer: MEDICARE

## 2022-05-18 DIAGNOSIS — M75.102 TEAR OF LEFT ROTATOR CUFF, UNSPECIFIED TEAR EXTENT, UNSPECIFIED WHETHER TRAUMATIC: Primary | ICD-10-CM

## 2022-05-18 DIAGNOSIS — Z48.89 AFTERCARE FOLLOWING SURGERY: ICD-10-CM

## 2022-05-18 PROCEDURE — 97140 MANUAL THERAPY 1/> REGIONS: CPT

## 2022-05-18 PROCEDURE — 97161 PT EVAL LOW COMPLEX 20 MIN: CPT

## 2022-05-18 NOTE — PROGRESS NOTES
PT Evaluation     Today's date: 2022  Patient name: Margarito Gann  : 1955  MRN: [id number]  Referring provider: Genna Rogers DO  Dx:   Encounter Diagnosis     ICD-10-CM    1  Tear of left rotator cuff, unspecified tear extent, unspecified whether traumatic  M75 102    2  Aftercare following surgery  Z48 89        Start Time: 1345  Stop Time: 1430  Total time in clinic (min): 45 minutes    Assessment  Assessment details: Patient is a 74 y o  male who presents to PT following left RC repair on 22  The patient was able to demonstrate AROM of the wrist and hand WFL  He has mild limiations of left elbow into flexion and extension  He is able to tolerate very minimal PROM of the shoulder this date  Mild swelling is noted at the shoulder  He was instructed with HEP this date  He is having difficulty with dressing and self care tasks at this time  He was educated about his current activity limitations as per surgeon's protocol  Patient notes understanding  Impairments: abnormal or restricted ROM and pain with function    Goals  STG - 4 weeks  Patient to report decreased pain to 2/10 with function of the left shoulder for dressing/hygene tasks  Patient able to complete self dressing tasks of the upper body without assistance  Patient able to complete hygiene tasks without assistance  Patient to have increased left shoulder PROM to 90degrees into flexion  LTG - 8 weeks  Patient able to demonstrate strength of the left shoulder into flexion/ abduction to 4/5  Patient to demonstrate increased strength of the left shoulder into ER to 4/5  Patient able to demonstrate left shoulder AROM to WNL for completion of functional tasks  Patient to note pain at worst with activity to be 1/10 SPR  Patient to demonstrate posture in sitting and standing WNL  Patient to be d/c to final/ revised HEP  Plan  Plan details: Patient's evaluation is completed  Patient was instructed with a HEP this date  Patient has scheduled further PT sessions for treatment  Patient would benefit from: skilled physical therapy  Planned modality interventions: ultrasound, unattended electrical stimulation and cryotherapy  Planned therapy interventions: manual therapy, neuromuscular re-education, therapeutic exercise, therapeutic training and home exercise program  Frequency: 2x week  Duration in weeks: 16  Plan of Care beginning date: 5/18/2022  Plan of Care expiration date: 9/16/2022        Subjective Evaluation    History of Present Illness  Mechanism of injury: Patient notes that he tore the left RC in February 2022 when he lifted a heavy bucket  He underwent surgery on 5/16/22 for a large cuff repair  He notes that prior to surgery he was limited in tolerance to active shoulder movement  He notes that he did have a hx of small RC tears in the left shoulder which improved with PT intervention  He notes compliance with his post-op sling  Pain  Current pain rating: 3  At worst pain rating: 3  Quality: dull ache    Patient Goals  Patient goals for therapy: decreased pain, increased motion, increased strength and independence with ADLs/IADLs          Objective     Cervical/Thoracic Screen   Cervical range of motion within normal limits    Active Range of Motion     Additional Active Range of Motion Details  AROM was not measured due to protocol  PROM was completed as tolerated this date which was minimal due to pain symptoms  Will measure at future session    AROM of the wrist was WNL  Able to complete fist closure  Elbow AROM 10-95    Strength/Myotome Testing     Right Shoulder   Normal muscle strength    Additional Strength Details  No strength testing completed at first post-op session                Precautions - No AROM for 6 weeks (6/27/22)       Manuals 5/18/22       PROM left shoulder CD       PROM left elbow with stretch into endranges CD                       Neuro Re-Ed         Active scap retraction/ elevation 1/10 Ther Ex        pendulums        Wrist flex/ext/UD/RD 1/10       gripping        C/s AROM 1/10                                       Ther Activity                        Gait Training                        Modalities        CP        IFC PRN                 Access Code: [email or web address]  URL: [email or web address]  Date: 05/18/2022  Prepared by: Zulma Torres    Exercises  · Seated Scapular Retraction - 1 x daily - 7 x weekly - 3 sets - 10 reps  · Seated Shoulder Shrugs - 1 x daily - 7 x weekly - 3 sets - 10 reps  · Wrist Flexion AROM - 1 x daily - 7 x weekly - 3 sets - 10 reps  · Wrist AROM Radial Ulnar Deviation - 1 x daily - 7 x weekly - 3 sets - 10 reps  · Wrist Flexion Extension AROM with Fingers Curled and Palm Down - 1 x daily - 7 x weekly - 3 sets - 10 reps  · Circular Shoulder Pendulum with Table Support - 1 x daily - 7 x weekly - 3 sets - 10 reps

## 2022-05-18 NOTE — LETTER
May 18, 2022    David Damico, DO  [address] [facility]  [address]    Patient: Shakira Sevilla   YOB: 1955   Date of Visit: 2022     Encounter Diagnosis     ICD-10-CM    1  Tear of left rotator cuff, unspecified tear extent, unspecified whether traumatic  M75 102        Dear Dr Isela Haskins:    Thank you for your recent referral of Shakira Sevilla  Please review the attached evaluation summary from Gene's recent visit  Please verify that you agree with the plan of care by signing the attached order  If you have any questions or concerns, please do not hesitate to call  I sincerely appreciate the opportunity to share in the care of one of your patients and hope to have another opportunity to work with you in the near future  Sincerely,    Maryam Vinson, PT      Referring Provider:      I certify that I have read the below Plan of Care and certify the need for these services furnished under this plan of treatment while under my care  David Damico, DO  [address] [address]  [place] [place]          PT Evaluation     Today's date: 2022  Patient name: Shakira Sevilla  : 1955  MRN: [id number]  Referring provider: Brittany Camara DO  Dx:   Encounter Diagnosis     ICD-10-CM    1  Tear of left rotator cuff, unspecified tear extent, unspecified whether traumatic  M75 102    2  Aftercare following surgery  Z48 89        Start Time: 1345  Stop Time: 1430  Total time in clinic (min): 45 minutes    Assessment  Assessment details: Patient is a 74 y o  male who presents to PT following left RC repair on 22  The patient was able to demonstrate AROM of the wrist and hand WFL  He has mild limiations of left elbow into flexion and extension  He is able to tolerate very minimal PROM of the shoulder this date  Mild swelling is noted at the shoulder  He was instructed with HEP this date   He is having difficulty with dressing and self care tasks at this time  He was educated about his current activity limitations as per surgeon's protocol  Patient notes understanding  Impairments: abnormal or restricted ROM and pain with function    Goals  STG - 4 weeks  Patient to report decreased pain to 2/10 with function of the left shoulder for dressing/hygene tasks  Patient able to complete self dressing tasks of the upper body without assistance  Patient able to complete hygiene tasks without assistance  Patient to have increased left shoulder PROM to 90degrees into flexion  LTG - 8 weeks  Patient able to demonstrate strength of the left shoulder into flexion/ abduction to 4/5  Patient to demonstrate increased strength of the left shoulder into ER to 4/5  Patient able to demonstrate left shoulder AROM to WNL for completion of functional tasks  Patient to note pain at worst with activity to be 1/10 SPR  Patient to demonstrate posture in sitting and standing WNL  Patient to be d/c to final/ revised HEP  Plan  Plan details: Patient's evaluation is completed  Patient was instructed with a HEP this date  Patient has scheduled further PT sessions for treatment  Patient would benefit from: skilled physical therapy  Planned modality interventions: ultrasound, unattended electrical stimulation and cryotherapy  Planned therapy interventions: manual therapy, neuromuscular re-education, therapeutic exercise, therapeutic training and home exercise program  Frequency: 2x week  Duration in weeks: 16  Plan of Care beginning date: 5/18/2022  Plan of Care expiration date: 9/16/2022        Subjective Evaluation    History of Present Illness  Mechanism of injury: Patient notes that he tore the left RC in February 2022 when he lifted a heavy bucket  He underwent surgery on 5/16/22 for a large cuff repair  He notes that prior to surgery he was limited in tolerance to active shoulder movement   He notes that he did have a hx of small RC tears in the left shoulder which improved with PT intervention  He notes compliance with his post-op sling  Pain  Current pain rating: 3  At worst pain rating: 3  Quality: dull ache    Patient Goals  Patient goals for therapy: decreased pain, increased motion, increased strength and independence with ADLs/IADLs          Objective     Cervical/Thoracic Screen   Cervical range of motion within normal limits    Active Range of Motion     Additional Active Range of Motion Details  AROM was not measured due to protocol  PROM was completed as tolerated this date which was minimal due to pain symptoms  Will measure at future session  AROM of the wrist was WNL  Able to complete fist closure  Elbow AROM 10-95    Strength/Myotome Testing     Right Shoulder   Normal muscle strength    Additional Strength Details  No strength testing completed at first post-op session                Precautions - No AROM for 6 weeks (6/27/22)       Manuals 5/18/22       PROM left shoulder CD       PROM left elbow with stretch into endranges CD                       Neuro Re-Ed         Active scap retraction/ elevation 1/10                                                       Ther Ex        pendulums        Wrist flex/ext/UD/RD 1/10       gripping        C/s AROM 1/10                                       Ther Activity                        Gait Training                        Modalities        CP        IFC PRN                 Access Code: [email or web address]  URL: [email or web address]  Date: 05/18/2022  Prepared by: Gaby Karimi    Exercises  · Seated Scapular Retraction - 1 x daily - 7 x weekly - 3 sets - 10 reps  · Seated Shoulder Shrugs - 1 x daily - 7 x weekly - 3 sets - 10 reps  · Wrist Flexion AROM - 1 x daily - 7 x weekly - 3 sets - 10 reps  · Wrist AROM Radial Ulnar Deviation - 1 x daily - 7 x weekly - 3 sets - 10 reps  · Wrist Flexion Extension AROM with Fingers Curled and Palm Down - 1 x daily - 7 x weekly - 3 sets - 10 reps  · Circular Shoulder Pendulum with Table Support - 1 x daily - 7 x weekly - 3 sets - 10 reps

## 2022-05-23 ENCOUNTER — OFFICE VISIT (OUTPATIENT)
Dept: PHYSICAL THERAPY | Facility: CLINIC | Age: 67
End: 2022-05-23
Payer: MEDICARE

## 2022-05-23 DIAGNOSIS — M75.102 TEAR OF LEFT ROTATOR CUFF, UNSPECIFIED TEAR EXTENT, UNSPECIFIED WHETHER TRAUMATIC: Primary | ICD-10-CM

## 2022-05-23 DIAGNOSIS — Z48.89 AFTERCARE FOLLOWING SURGERY: ICD-10-CM

## 2022-05-23 PROCEDURE — 97110 THERAPEUTIC EXERCISES: CPT

## 2022-05-23 PROCEDURE — 97140 MANUAL THERAPY 1/> REGIONS: CPT

## 2022-05-25 ENCOUNTER — OFFICE VISIT (OUTPATIENT)
Dept: PHYSICAL THERAPY | Facility: CLINIC | Age: 67
End: 2022-05-25
Payer: MEDICARE

## 2022-05-25 DIAGNOSIS — Z48.89 AFTERCARE FOLLOWING SURGERY: ICD-10-CM

## 2022-05-25 DIAGNOSIS — M75.102 TEAR OF LEFT ROTATOR CUFF, UNSPECIFIED TEAR EXTENT, UNSPECIFIED WHETHER TRAUMATIC: Primary | ICD-10-CM

## 2022-05-25 PROCEDURE — 97110 THERAPEUTIC EXERCISES: CPT

## 2022-05-25 PROCEDURE — 97140 MANUAL THERAPY 1/> REGIONS: CPT

## 2022-05-25 NOTE — PROGRESS NOTES
Daily Note     Today's date: 2022  Patient name: Erika Hopper  : 1955  MRN: [id number]  Referring provider: Nagi Langley DO  Dx:   Encounter Diagnosis     ICD-10-CM    1  Tear of left rotator cuff, unspecified tear extent, unspecified whether traumatic  M75 102    2  Aftercare following surgery  Z48 89                   Subjective: Patient notes that he is sleeping well at night with use of the sling  He notes that during the day when he is sitting he removes the sling since it tends to have some elbow pain  Objective: See treatment diary below      Assessment: Tolerated treatment well  Patient would benefit from continued PT  Able to tolerate scapular mobilization well this date  Able to achieve PROM of shoulder flexion to 80 degrees this date  Plan: Continue per plan of care           Precautions - No AROM for 6 weeks (22)       Manuals 22     PROM left shoulder CD CD CD     PROM left elbow with stretch into endranges CD CD CD     ST jt mob   CD     sidely scap retraction/ medial rotation MRE   1/15     Neuro Re-Ed         Active scap retraction/ elevation 1/10 X 10 2/10                                                     Ther Ex        pendulums  2 x 10 ea 2/10     Wrist flex/ext/UD/RD 1/10 2 x 10 ea 2/10     gripping  Grn squeeze ball x 30 Ball squeeze  x30     C/s AROM 1/10 x10 ea 1/10                                     Ther Activity                        Gait Training                        Modalities        CP  X 10 min 10 min     IFC PRN

## 2022-05-27 ENCOUNTER — OFFICE VISIT (OUTPATIENT)
Dept: PHYSICAL THERAPY | Facility: CLINIC | Age: 67
End: 2022-05-27
Payer: MEDICARE

## 2022-05-27 DIAGNOSIS — M75.102 TEAR OF LEFT ROTATOR CUFF, UNSPECIFIED TEAR EXTENT, UNSPECIFIED WHETHER TRAUMATIC: Primary | ICD-10-CM

## 2022-05-27 DIAGNOSIS — Z48.89 AFTERCARE FOLLOWING SURGERY: ICD-10-CM

## 2022-05-27 PROCEDURE — 97110 THERAPEUTIC EXERCISES: CPT

## 2022-05-27 PROCEDURE — 97140 MANUAL THERAPY 1/> REGIONS: CPT

## 2022-05-27 NOTE — PROGRESS NOTES
Daily Note     Today's date: 2022  Patient name: Ena Holt  : 1955  MRN: [id number]  Referring provider: Rogelio Multani DO  Dx:   Encounter Diagnosis     ICD-10-CM    1  Tear of left rotator cuff, unspecified tear extent, unspecified whether traumatic  M75 102    2  Aftercare following surgery  Z48 89                   Subjective: Patient notes that he is doing well this date  Objective: See treatment diary below      Assessment: Tolerated treatment well  Patient would benefit from continued PT to reach post-op goals  Plan: Continue per plan of care           Precautions - No AROM for 6 weeks (22)       Manuals 22    PROM left shoulder CD CD CD CD    PROM left elbow with stretch into endranges CD CD CD CD    ST jt mob   CD CD    sidely scap retraction/ medial rotation MRE   1/15 2/10    Neuro Re-Ed         Active scap retraction/ elevation 1/10 X 10 2/10 MRE 2/10                                                    Ther Ex        pendulums  2 x 10 ea 210 2/10    Wrist flex/ext/UD/RD 1/10 2 x 10 ea 2/10 2/10    gripping  Grn squeeze ball x 30 Ball squeeze  x30 Ball squeeze x30    C/s AROM 10 x10 ea 1/10 HEP                                    Ther Activity                        Gait Training                        Modalities        CP  X 10 min 10 min 10 min    IFC PRN

## 2022-06-01 ENCOUNTER — OFFICE VISIT (OUTPATIENT)
Dept: PHYSICAL THERAPY | Facility: CLINIC | Age: 67
End: 2022-06-01
Payer: MEDICARE

## 2022-06-01 DIAGNOSIS — Z48.89 AFTERCARE FOLLOWING SURGERY: ICD-10-CM

## 2022-06-01 DIAGNOSIS — M75.102 TEAR OF LEFT ROTATOR CUFF, UNSPECIFIED TEAR EXTENT, UNSPECIFIED WHETHER TRAUMATIC: Primary | ICD-10-CM

## 2022-06-01 PROCEDURE — 97140 MANUAL THERAPY 1/> REGIONS: CPT

## 2022-06-01 PROCEDURE — 97110 THERAPEUTIC EXERCISES: CPT

## 2022-06-01 NOTE — PROGRESS NOTES
Daily Note     Today's date: 2022  Patient name: Laurita West  : 1955  MRN: [id number]  Referring provider: Valentina Davison DO  Dx:   Encounter Diagnosis     ICD-10-CM    1  Tear of left rotator cuff, unspecified tear extent, unspecified whether traumatic  M75 102    2  Aftercare following surgery  Z48 89                   Subjective: Patient notes that he has mild shoulder pain symptoms - but is doing well overall  Objective: See treatment diary below      Assessment: Tolerated treatment well  Patient would benefit from continued PT intervention to progress ROM and decrease pain  Patient was able to progress to wand AAROM in supine and seated table slides for AAROM  Plan: Continue per plan of care           Precautions - No AROM for 6 weeks (22)       Manuals 22   PROM left shoulder CD CD CD CD CD   PROM left elbow with stretch into endranges CD CD CD CD D/c    ST jt mob   CD CD CD   sidely scap retraction/ medial rotation MRE   1/15 2/10 2/10   Neuro Re-Ed         Active scap retraction/ elevation 1/10 X 10 2/10 MRE 2/10 MRE @ scap 2/10   Supine wand chest press AAROM     x10                                           Ther Ex        pendulums  2 x 10 ea 210 2/10 2/10   Wrist flex/ext/UD/RD /10 2 x 10 ea 2/10 2/10 D/c to HEP   gripping  Grn squeeze ball x 30 Ball squeeze  x30 Ball squeeze x30 HEP   C/s AROM 1/10 x10 ea 10 HEP xxx   Table slide into flexion     10                           Ther Activity                        Gait Training                        Modalities        CP  X 10 min 10 min 10 min 10 min   IFC PRN

## 2022-06-02 ENCOUNTER — OFFICE VISIT (OUTPATIENT)
Dept: OBGYN CLINIC | Facility: CLINIC | Age: 67
End: 2022-06-02

## 2022-06-02 ENCOUNTER — APPOINTMENT (OUTPATIENT)
Dept: RADIOLOGY | Facility: MEDICAL CENTER | Age: 67
End: 2022-06-02
Payer: MEDICARE

## 2022-06-02 VITALS
SYSTOLIC BLOOD PRESSURE: 150 MMHG | DIASTOLIC BLOOD PRESSURE: 90 MMHG | WEIGHT: 209 LBS | BODY MASS INDEX: 33.59 KG/M2 | HEIGHT: 66 IN

## 2022-06-02 DIAGNOSIS — M75.102 TEAR OF LEFT ROTATOR CUFF, UNSPECIFIED TEAR EXTENT, UNSPECIFIED WHETHER TRAUMATIC: ICD-10-CM

## 2022-06-02 DIAGNOSIS — M75.102 TEAR OF LEFT ROTATOR CUFF, UNSPECIFIED TEAR EXTENT, UNSPECIFIED WHETHER TRAUMATIC: Primary | ICD-10-CM

## 2022-06-02 PROCEDURE — 99024 POSTOP FOLLOW-UP VISIT: CPT | Performed by: ORTHOPAEDIC SURGERY

## 2022-06-02 PROCEDURE — 73030 X-RAY EXAM OF SHOULDER: CPT

## 2022-06-02 NOTE — PROGRESS NOTES
ASSESSMENT/PLAN:    Diagnoses and all orders for this visit:    Tear of left rotator cuff, unspecified tear extent, unspecified whether traumatic  -     XR shoulder 2+ vw left; Future        X-rays the patient's left shoulder are consistent with adequate decompression of the subacromial joint space  There are no fractures dislocations  The patient is doing very well since surgery  He should continue physical therapy and occupational therapy with passive and active assist   He should not participate in any active range of motion  He should continue his shoulder immobilizer  He will follow up with our office in 1 month for strength and motion check  The patient is acceptable to this plan  Return in about 1 month (around 7/2/2022)  The patient is doing quite well from his left shoulder rotator cuff repair  Continue shoulder immobilizer  Passive range of motion program for now  Follow-up in 1 month for re-evaluation  If his condition changes, he will not hesitate to let us know      _____________________________________________________  CHIEF COMPLAINT:  Chief Complaint   Patient presents with    Left Shoulder - Post-op         SUBJECTIVE:  Cecelia Matthews is a 74 y o  male who presents to our office for postop visit  The patient is status post left shoulder arthroscopy with rotator cuff repair from 05/16/2022  He denies any significant pain  He states he has been using the shoulder immobilizer and has not participate in active range of motion  He has been participating in physical therapy  He denies any numbness or tingling  He denies any fever or chills        The following portions of the patient's history were reviewed and updated as appropriate: allergies, current medications, past family history, past medical history, past social history, past surgical history and problem list     PAST MEDICAL HISTORY:  Past Medical History:   Diagnosis Date    Arthritis     Rotator cuff injury     left shoulder    Seasonal allergies        PAST SURGICAL HISTORY:  Past Surgical History:   Procedure Laterality Date    COLOGUARD (HISTORICAL)      JOINT REPLACEMENT Right 2018    MULTIPLE TOOTH EXTRACTIONS      in office under local    VT [facility] ARTHROSCOP,SURG,W/ROTAT CUFF REPR Left 5/16/2022    Procedure: LEFT SHOULDER ARTHROSCOPY WITH POSSIBLE ROTATOR CUFF REPAIR;  Surgeon: Dionicio Krishna DO;  Location: [facility] MAIN OR;  Service: Orthopedics    VT TOTAL KNEE ARTHROPLASTY Right 02/26/2019    Procedure: ARTHROPLASTY KNEE TOTAL;  Surgeon: Dyan Lopez DO;  Location: [facility] MAIN OR;  Service: Orthopedics    VT TOTAL KNEE ARTHROPLASTY Left 01/13/2021    Procedure: KNEE TOTAL ARTHROPLASTY;  Surgeon: Dyan Lopez DO;  Location: [facility] MAIN OR;  Service: Orthopedics    TONSILLECTOMY         FAMILY HISTORY:  Family History   Problem Relation Age of Onset    No Known Problems Mother     Ulcerative colitis Father     Ulcers Father     No Known Problems Sister     No Known Problems Brother        SOCIAL HISTORY:  Social History     Tobacco Use    Smoking status: Light Tobacco Smoker     Packs/day: 0 25     Years: 51 00     Pack years: 12 75     Types: Cigarettes    Smokeless tobacco: Current User    Tobacco comment: one pack a week   Vaping Use    Vaping Use: Never used   Substance Use Topics    Alcohol use:  Yes     Alcohol/week: 4 0 standard drinks     Types: 3 Cans of beer, 1 Shots of liquor per week     Comment: 4-6 drinks a week    Drug use: Never       MEDICATIONS:    Current Outpatient Medications:     ibuprofen (MOTRIN) 600 mg tablet, Take by mouth every 6 (six) hours as needed for mild pain, Disp: , Rfl:     pseudoephedrine (SUDAFED) 120 MG 12 hr tablet, Take 120 mg by mouth if needed, Disp: , Rfl:     ascorbic acid (VITAMIN C) 500 MG tablet, Take 1 tablet (500 mg total) by mouth 2 (two) times a day (Patient not taking: Reported on 6/2/2022), Disp: 60 tablet, Rfl: 1    ALLERGIES:  Allergies   Allergen Reactions    Other Allergic Rhinitis     Mold Spores       ROS:  Review of Systems     Constitutional: Negative for fatigue, fever or loss of appetite  HENT: Negative  Respiratory: Negative for shortness of breath, dyspnea  Cardiovascular: Negative for chest pain/tightness  Gastrointestinal: Negative for abdominal pain, N/V  Endocrine: Negative for cold/heat intolerance, unexplained weight loss/gain  Genitourinary: Negative for flank pain, dysuria, hematuria  Musculoskeletal:  Negative for arthralgia   Skin: Negative for rash  Neurological: Negative for numbness or tingling  Psychiatric/Behavioral: Negative for agitation  _____________________________________________________  PHYSICAL EXAMINATION:    Blood pressure 150/90, height 5' 6" (1 676 m), weight 94 8 kg (209 lb)  Constitutional: Oriented to person, place, and time  Appears well-developed and well-nourished  No distress  HENT:   Head: Normocephalic  Eyes: Conjunctivae are normal  Right eye exhibits no discharge  Left eye exhibits no discharge  No scleral icterus  Cardiovascular: Normal rate  Pulmonary/Chest: Effort normal    Neurological: Alert and oriented to person, place, and time  Skin: Skin is warm and dry  No rash noted  Not diaphoretic  No erythema  No pallor  Psychiatric: Normal mood and affect  Behavior is normal  Judgment and thought content normal       MUSCULOSKELETAL EXAMINATION:   Physical Exam  Ortho Exam    Left upper extremity is neurovascularly intact  Fingers are pink and mobile  Compartments are soft  Incisions are healing well  No warmth, erythema or ecchymosis noted  Brisk cap refill  Sensation intact  No significant edema  Objective:  BP Readings from Last 1 Encounters:   06/02/22 150/90      Wt Readings from Last 1 Encounters:   06/02/22 94 8 kg (209 lb)        BMI:   Estimated body mass index is 33 73 kg/m² as calculated from the following:    Height as of this encounter: 5' 6" (1 676 m)      Weight as of this encounter: 94 8 kg (209 lb)            Scribe Attestation    I,:  Jono Barillas PA-C am acting as a scribe while in the presence of the attending physician :       I,:  Clif Plascencia DO personally performed the services described in this documentation    as scribed in my presence :

## 2022-06-03 ENCOUNTER — OFFICE VISIT (OUTPATIENT)
Dept: PHYSICAL THERAPY | Facility: CLINIC | Age: 67
End: 2022-06-03
Payer: MEDICARE

## 2022-06-03 DIAGNOSIS — Z48.89 AFTERCARE FOLLOWING SURGERY: ICD-10-CM

## 2022-06-03 DIAGNOSIS — M75.102 TEAR OF LEFT ROTATOR CUFF, UNSPECIFIED TEAR EXTENT, UNSPECIFIED WHETHER TRAUMATIC: Primary | ICD-10-CM

## 2022-06-03 PROCEDURE — 97110 THERAPEUTIC EXERCISES: CPT

## 2022-06-03 PROCEDURE — 97140 MANUAL THERAPY 1/> REGIONS: CPT

## 2022-06-03 NOTE — PROGRESS NOTES
Daily Note     Today's date: 6/3/2022  Patient name: Morgan Acosta  : 1955  MRN: [id number]  Referring provider: Harry Taylor DO  Dx:   Encounter Diagnosis     ICD-10-CM    1  Tear of left rotator cuff, unspecified tear extent, unspecified whether traumatic  M75 102    2  Aftercare following surgery  Z48 89        Start Time: 1100  Stop Time: 1145  Total time in clinic (min): 45 minutes    Subjective: Patient stated having seen the surgeon yesterday and was informed healing is on track  He has to continue to don the sling for roughly 3 more weeks  He offered no c/o pain today, just discomfort from the sling  Objective: See treatment diary below      Assessment: Patient did well with program without difficulty  He had some discomfort at end range during PROM, however was able to tolerate  He noted being better able to perform cane chest press TE this session  Ice was applied L shoulder in seated, post treat  Plan: Continue per plan of care           Precautions - No AROM for 6 weeks (22)       Manuals 6/3 5/23/22 5/25/22 5/27/22 6/   PROM left shoulder KW CD CD CD CD   PROM left elbow with stretch into endranges  CD CD CD D/c    ST jt mob NV  CD CD CD   sidely scap retraction/ medial rotation MRE   15 2/10 2/10   Neuro Re-Ed         Active scap retraction/  elevation MRE @ scap 2x10 each  X 10 2/10 MRE 2/10 MRE @ scap 2/10   Supine wand chest press AAROM x15     x10                                           Ther Ex        pendulums 2x10  2 x 10 ea 2/10 2/10 2/10   Wrist flex/ext/UD/RD  2 x 10 ea 2/10 2/10 D/c to HEP   gripping  Grn squeeze ball x 30 Ball squeeze  x30 Ball squeeze x30 HEP   C/s AROM  x10 ea 1/10 HEP xxx   Table slide into flexion 2x10     2/10                           Ther Activity                        Gait Training                        Modalities        CP x10 min  X 10 min 10 min 10 min 10 min   IFC PRN

## 2022-06-06 ENCOUNTER — OFFICE VISIT (OUTPATIENT)
Dept: PHYSICAL THERAPY | Facility: CLINIC | Age: 67
End: 2022-06-06
Payer: MEDICARE

## 2022-06-06 DIAGNOSIS — Z48.89 AFTERCARE FOLLOWING SURGERY: ICD-10-CM

## 2022-06-06 DIAGNOSIS — M75.102 TEAR OF LEFT ROTATOR CUFF, UNSPECIFIED TEAR EXTENT, UNSPECIFIED WHETHER TRAUMATIC: Primary | ICD-10-CM

## 2022-06-06 PROCEDURE — 97110 THERAPEUTIC EXERCISES: CPT

## 2022-06-06 PROCEDURE — 97140 MANUAL THERAPY 1/> REGIONS: CPT

## 2022-06-06 NOTE — PROGRESS NOTES
Daily Note     Today's date: 2022  Patient name: Nancy Knott  : 1955  MRN: [id number]  Referring provider: Torres Orozco DO  Dx:   Encounter Diagnosis     ICD-10-CM    1  Tear of left rotator cuff, unspecified tear extent, unspecified whether traumatic  M75 102    2  Aftercare following surgery  Z48 89                   Subjective: Patient notes that he is doing well overall at this time  He does need to continue with his sling use as per his surgeon - he notes compliance with this  Objective: See treatment diary below      Assessment: Tolerated treatment well  Patient would benefit from continued PT to progress PROM  Patient able to complete abduction with wand for PROM  Plan: Continue per plan of care           Precautions - No AROM for 6 weeks (22)       Manuals 6/3 6/6 5/25/22 5/27/22 6/1   PROM left shoulder KW CD CD CD CD   PROM left elbow with stretch into endranges  CD CD CD D/c    ST jt mob NV CD CD CD CD   sidely scap retraction/ medial rotation MRE  2/10 1/15 2/10 2/10   Neuro Re-Ed         Active scap retraction/  elevation MRE @ scap 2x10 each  MRE 2/10 2/10 MRE 2/10 MRE @ scap 2/10   Supine wand chest press AAROM x15  Chest press 1/15  Abduction 1/5   x10                                           Ther Ex        pendulums 2x10  2 x 10 ea 2/10 210 2/10   Wrist flex/ext/UD/RD   2/10 2/10 D/c to HEP   gripping   Ball squeeze  x30 Ball squeeze x30 HEP   C/s AROM   1/10 HEP xxx   Table slide into flexion 2x10  210   2/10                           Ther Activity                        Gait Training                        Modalities        CP x10 min  X 10 min 10 min 10 min 10 min   IFC PRN

## 2022-06-08 ENCOUNTER — APPOINTMENT (OUTPATIENT)
Dept: PHYSICAL THERAPY | Facility: CLINIC | Age: 67
End: 2022-06-08
Payer: MEDICARE

## 2022-06-09 ENCOUNTER — OFFICE VISIT (OUTPATIENT)
Dept: PHYSICAL THERAPY | Facility: CLINIC | Age: 67
End: 2022-06-09
Payer: MEDICARE

## 2022-06-09 DIAGNOSIS — Z48.89 AFTERCARE FOLLOWING SURGERY: ICD-10-CM

## 2022-06-09 DIAGNOSIS — M75.102 TEAR OF LEFT ROTATOR CUFF, UNSPECIFIED TEAR EXTENT, UNSPECIFIED WHETHER TRAUMATIC: Primary | ICD-10-CM

## 2022-06-09 PROCEDURE — 97110 THERAPEUTIC EXERCISES: CPT

## 2022-06-09 PROCEDURE — 97140 MANUAL THERAPY 1/> REGIONS: CPT

## 2022-06-09 NOTE — PROGRESS NOTES
Daily Note     Today's date: 2022  Patient name: Rea Puri  : 1955  MRN: [id number]  Referring provider: Lizzie Yañez DO  Dx:   Encounter Diagnosis     ICD-10-CM    1  Tear of left rotator cuff, unspecified tear extent, unspecified whether traumatic  M75 102    2  Aftercare following surgery  Z48 89                   Subjective: Patient notes no pain at the moment  He feels he is doing well overall  Objective: See treatment diary below      Assessment: Tolerated treatment well  Patient would benefit from continued PT to progress PROM/ AAROM of the left shoulder  Patient is progressing toward all goals nicely      Plan: Continue per plan of care           Precautions - No AROM for 6 weeks (22)       Manuals 6/3 6/6 6/9 5/27/22 6/1   PROM left shoulder KW CD CD CD CD   PROM left elbow with stretch into endranges  CD CD CD D/c    ST jt mob NV CD CD CD CD   sidely scap retraction/ medial rotation MRE  2/10 1/15 2/10 2/10   Neuro Re-Ed         Active scap retraction/  elevation MRE @ scap 2x10 each  MRE 2/10 MRE 2/10 MRE 2/10 MRE @ scap 2/10   Supine wand chest press AAROM x15  Chest press /15  Abduction 1/5 Chest press 2/10  Abduction 1/10  x10                                           Ther Ex        pendulums 2x10  2 x 10 ea 2/10 2/10 2/10   Wrist flex/ext/UD/RD    2/10 D/c to HEP   gripping    Ball squeeze x30 HEP   C/s AROM    HEP xxx   Table slide into flexion 2x10  2/10 2/10  2/10                           Ther Activity                        Gait Training                        Modalities        CP x10 min  X 10 min 10 min 10 min 10 min   IFC PRN

## 2022-06-10 ENCOUNTER — APPOINTMENT (OUTPATIENT)
Dept: PHYSICAL THERAPY | Facility: CLINIC | Age: 67
End: 2022-06-10
Payer: MEDICARE

## 2022-06-13 ENCOUNTER — OFFICE VISIT (OUTPATIENT)
Dept: PHYSICAL THERAPY | Facility: CLINIC | Age: 67
End: 2022-06-13
Payer: MEDICARE

## 2022-06-13 DIAGNOSIS — Z48.89 AFTERCARE FOLLOWING SURGERY: ICD-10-CM

## 2022-06-13 DIAGNOSIS — M75.102 TEAR OF LEFT ROTATOR CUFF, UNSPECIFIED TEAR EXTENT, UNSPECIFIED WHETHER TRAUMATIC: Primary | ICD-10-CM

## 2022-06-13 PROCEDURE — 97140 MANUAL THERAPY 1/> REGIONS: CPT

## 2022-06-13 PROCEDURE — 97110 THERAPEUTIC EXERCISES: CPT

## 2022-06-13 NOTE — PROGRESS NOTES
Daily Note     Today's date: 2022  Patient name: Rea Puri  : 1955  MRN: [id number]  Referring provider: Lizzie Yañez DO  Dx:   Encounter Diagnosis     ICD-10-CM    1  Tear of left rotator cuff, unspecified tear extent, unspecified whether traumatic  M75 102    2  Aftercare following surgery  Z48 89                   Subjective: Patient notes that he was able to complete laundry this weekend without increased pain complaints  Objective: See treatment diary below      Assessment: Tolerated treatment well  Patient would benefit from continued PT intervention to improve strength and reach goals for the left shoulder function recovery  Plan: Continue per plan of care           Precautions - No AROM for 6 weeks (22)       Manuals 6/3 6/6 6/9 6/13 6/1   PROM left shoulder KW CD CD CD CD   PROM left elbow with stretch into endranges  CD CD CD D/c    ST jt mob NV CD CD CD CD   sidely scap retraction/ medial rotation MRE  2/10 1/15 2/10 2/10   Neuro Re-Ed         Active scap retraction/  elevation MRE @ scap 2x10 each  MRE 2/10 MRE 2/10 MRE 2/10 MRE @ scap 2/10   Supine wand chest press AAROM x15  Chest press 1/15  Abduction 1/5 Chest press 2/10  Abduction 1/10 Chest press 2/10  Abduction 1/10 x10                                           Ther Ex        pendulums 2x10  2 x 10 ea 2/10 2/10 2/10   Wrist flex/ext/UD/RD     D/c to HEP   gripping     HEP   C/s AROM    HEP xxx   Table slide into flexion 2x10  2/10 2/10 2/10 2/10                           Ther Activity                        Gait Training                        Modalities        CP x10 min  X 10 min 10 min 10 min 10 min   IFC PRN

## 2022-06-15 ENCOUNTER — APPOINTMENT (OUTPATIENT)
Dept: PHYSICAL THERAPY | Facility: CLINIC | Age: 67
End: 2022-06-15
Payer: MEDICARE

## 2022-06-16 ENCOUNTER — EVALUATION (OUTPATIENT)
Dept: PHYSICAL THERAPY | Facility: CLINIC | Age: 67
End: 2022-06-16
Payer: MEDICARE

## 2022-06-16 DIAGNOSIS — Z48.89 AFTERCARE FOLLOWING SURGERY: ICD-10-CM

## 2022-06-16 DIAGNOSIS — M75.102 TEAR OF LEFT ROTATOR CUFF, UNSPECIFIED TEAR EXTENT, UNSPECIFIED WHETHER TRAUMATIC: Primary | ICD-10-CM

## 2022-06-16 PROCEDURE — 97110 THERAPEUTIC EXERCISES: CPT

## 2022-06-16 PROCEDURE — 97140 MANUAL THERAPY 1/> REGIONS: CPT

## 2022-06-16 NOTE — PROGRESS NOTES
PT Re-Evaluation     Today's date: 2022  Patient name: Oneyda Saucedo  : 1955  MRN: [id number]  Referring provider: Gabriela Barron DO  Dx:   Encounter Diagnosis     ICD-10-CM    1  Tear of left rotator cuff, unspecified tear extent, unspecified whether traumatic  M75 102    2  Aftercare following surgery  Z48 89                   Assessment  Assessment details: Patient is a 74 y o  male who has been seen at PT for 10 visits since [facility] following left RC repair on 22  He is using modified approaches to complete dressing and self care tasks at this time  He is able to demonstrate improving PROM of the shoulder with pain into flexion and ER motions  Will progress his POC as per surgeon's protocol  Impairments: abnormal or restricted ROM and pain with function    Goals  STG - 4 weeks - all STG met  Patient to report decreased pain to 2/10 with function of the left shoulder for dressing/hygene tasks  Patient able to complete self dressing tasks of the upper body without assistance  Patient able to complete hygiene tasks without assistance  Patient to have increased left shoulder PROM to 90degrees into flexion  LTG - 12 weeks  Patient able to demonstrate strength of the left shoulder into flexion/ abduction to 4/5  Patient to demonstrate increased strength of the left shoulder into ER to 4/5  Patient able to demonstrate left shoulder AROM to WNL for completion of functional tasks  Patient to note pain at worst with activity to be 1/10 SPR  Patient to demonstrate posture in sitting and standing WNL  Patient to be d/c to final/ revised HEP  Plan  Plan details: Patient's evaluation is completed  Patient was instructed with a HEP this date  Patient has scheduled further PT sessions for treatment     Patient would benefit from: skilled physical therapy  Planned modality interventions: ultrasound, unattended electrical stimulation and cryotherapy  Planned therapy interventions: manual therapy, neuromuscular re-education, therapeutic exercise, therapeutic training and home exercise program  Frequency: 2x week  Duration in weeks: 12  Plan of Care beginning date: 6/16/2022  Plan of Care expiration date: 9/16/2022        Subjective Evaluation    History of Present Illness  Mechanism of injury: Patient notes that he tore the left RC in February 2022 when he lifted a heavy bucket  He underwent surgery on 5/16/22 for a large cuff repair  He notes that prior to surgery he was limited in tolerance to active shoulder movement  He notes that he did have a hx of small RC tears in the left shoulder which improved with PT intervention  He notes compliance with his post-op sling  UPDATE 6/16/22  Patient notes that "everything" is challenging because he is not to actively elevate his shoulder  Patient notes that he is able to wash dishes and fold socks     Pain  Current pain rating: 3  At worst pain rating: 3  Quality: dull ache    Patient Goals  Patient goals for therapy: decreased pain, increased motion, increased strength and independence with ADLs/IADLs          Objective     Cervical/Thoracic Screen   Cervical range of motion within normal limits    Active Range of Motion     Additional Active Range of Motion Details  AROM was not measured due to protocol  AROM of the wrist was WNL  Able to complete fist closure  Elbow AROM 0-120    Passive Range of Motion   Left Shoulder   Flexion: 150 degrees   Abduction: 130 degrees with pain  External rotation 45°: 45 degrees with pain  Internal rotation 45°: [facility]    Strength/Myotome Testing     Right Shoulder   Normal muscle strength    Additional Strength Details  No strength testing completed at first post-op session                  Manuals 6/3 6/6 6/9 6/13 6/16   PROM left shoulder KW CD CD CD CD   PROM left elbow with stretch into endranges  CD CD CD D/C   ST jt mob NV CD CD CD CD   sidely scap retraction/ medial rotation MRE  2/10 1/15 2/10 2/10   Neuro Re-Ed Active scap retraction/  elevation MRE @ scap 2x10 each  MRE 2/10 MRE 2/10 MRE 2/10 MRE @ scap 2/10   Supine wand chest press AAROM x15  Chest press 1/15  Abduction 1/5 Chest press 2/10  Abduction 1/10 Chest press 2/10  Abduction 1/10 Standing flex/ abd 2/10                                           Ther Ex        pendulums 2x10  2 x 10 ea 2/10 2/10 2/10                       HEP    Table slide into flexion 2x10  2/10 2/10 2/10 2/10   pulleys     3 min   Finger ladder     5x   Sub max iso - IR/ER/Ext     2/10 50%   Ther Activity                        Gait Training                        Modalities        CP x10 min  X 10 min 10 min 10 min 10 min   IFC PRN                   Access Code: [email or web address]  URL: [email or web address]  Date: 05/18/2022  Prepared by: Rafael Sarmiento    Exercises  · Seated Scapular Retraction - 1 x daily - 7 x weekly - 3 sets - 10 reps  · Seated Shoulder Shrugs - 1 x daily - 7 x weekly - 3 sets - 10 reps  · Wrist Flexion AROM - 1 x daily - 7 x weekly - 3 sets - 10 reps  · Wrist AROM Radial Ulnar Deviation - 1 x daily - 7 x weekly - 3 sets - 10 reps  · Wrist Flexion Extension AROM with Fingers Curled and Palm Down - 1 x daily - 7 x weekly - 3 sets - 10 reps  · Circular Shoulder Pendulum with Table Support - 1 x daily - 7 x weekly - 3 sets - 10 reps

## 2022-06-16 NOTE — LETTER
2022    Abdiel Garza DO  [address] [facility]  [address]    Patient: Ankit Moses   YOB: 1955   Date of Visit: 2022     Encounter Diagnosis     ICD-10-CM    1  Tear of left rotator cuff, unspecified tear extent, unspecified whether traumatic  M75 102    2  Aftercare following surgery  Z48 89        Dear Dr Maximino Moore:    Thank you for your recent referral of Ankit Moses  Please review the attached evaluation summary from Gene's recent visit  Please verify that you agree with the plan of care by signing the attached order  If you have any questions or concerns, please do not hesitate to call  I sincerely appreciate the opportunity to share in the care of one of your patients and hope to have another opportunity to work with you in the near future  Sincerely,    Dorene Braden, PT      Referring Provider:      I certify that I have read the below Plan of Care and certify the need for these services furnished under this plan of treatment while under my care  Abdiel Garza DO  [address] [address]  [address]          PT Re-Evaluation     Today's date: 2022  Patient name: Ankit Moses  : 1955  MRN: [id number]  Referring provider: Sarah Costa DO  Dx:   Encounter Diagnosis     ICD-10-CM    1  Tear of left rotator cuff, unspecified tear extent, unspecified whether traumatic  M75 102    2  Aftercare following surgery  Z48 89                   Assessment  Assessment details: Patient is a 74 y o  male who has been seen at PT for 10 visits since [facility] following left RC repair on 22  He is using modified approaches to complete dressing and self care tasks at this time  He is able to demonstrate improving PROM of the shoulder with pain into flexion and ER motions  Will progress his POC as per surgeon's protocol     Impairments: abnormal or restricted ROM and pain with function    Goals  STG - 4 weeks - all STG met  Patient to report decreased pain to 2/10 with function of the left shoulder for dressing/hygene tasks  Patient able to complete self dressing tasks of the upper body without assistance  Patient able to complete hygiene tasks without assistance  Patient to have increased left shoulder PROM to 90degrees into flexion  LTG - 12 weeks  Patient able to demonstrate strength of the left shoulder into flexion/ abduction to 4/5  Patient to demonstrate increased strength of the left shoulder into ER to 4/5  Patient able to demonstrate left shoulder AROM to WNL for completion of functional tasks  Patient to note pain at worst with activity to be 1/10 SPR  Patient to demonstrate posture in sitting and standing WNL  Patient to be d/c to final/ revised HEP  Plan  Plan details: Patient's evaluation is completed  Patient was instructed with a HEP this date  Patient has scheduled further PT sessions for treatment  Patient would benefit from: skilled physical therapy  Planned modality interventions: ultrasound, unattended electrical stimulation and cryotherapy  Planned therapy interventions: manual therapy, neuromuscular re-education, therapeutic exercise, therapeutic training and home exercise program  Frequency: 2x week  Duration in weeks: 12  Plan of Care beginning date: 6/16/2022  Plan of Care expiration date: 9/16/2022        Subjective Evaluation    History of Present Illness  Mechanism of injury: Patient notes that he tore the left RC in February 2022 when he lifted a heavy bucket  He underwent surgery on 5/16/22 for a large cuff repair  He notes that prior to surgery he was limited in tolerance to active shoulder movement  He notes that he did have a hx of small RC tears in the left shoulder which improved with PT intervention  He notes compliance with his post-op sling       UPDATE 6/16/22  Patient notes that "everything" is challenging because he is not to actively elevate his shoulder  Patient notes that he is able to wash dishes and fold socks  Pain  Current pain rating: 3  At worst pain rating: 3  Quality: dull ache    Patient Goals  Patient goals for therapy: decreased pain, increased motion, increased strength and independence with ADLs/IADLs          Objective     Cervical/Thoracic Screen   Cervical range of motion within normal limits    Active Range of Motion     Additional Active Range of Motion Details  AROM was not measured due to protocol  AROM of the wrist was WNL  Able to complete fist closure  Elbow AROM 0-120    Passive Range of Motion   Left Shoulder   Flexion: 150 degrees   Abduction: 130 degrees with pain  External rotation 45°: 45 degrees with pain  Internal rotation 45°: [facility]    Strength/Myotome Testing     Right Shoulder   Normal muscle strength    Additional Strength Details  No strength testing completed at first post-op session                  Manuals 6/3 6/6 6/9 6/13 6/16   PROM left shoulder KW CD CD CD CD   PROM left elbow with stretch into endranges  CD CD CD D/C   ST jt mob NV CD CD CD CD   sidely scap retraction/ medial rotation MRE  2/10 1/15 2/10 2/10   Neuro Re-Ed         Active scap retraction/  elevation MRE @ scap 2x10 each  MRE 2/10 MRE 2/10 MRE 2/10 MRE @ scap 2/10   Supine wand chest press AAROM x15  Chest press 1/15  Abduction 1/5 Chest press 2/10  Abduction 1/10 Chest press 2/10  Abduction 1/10 Standing flex/ abd 2/10                                           Ther Ex        pendulums 2x10  2 x 10 ea 2/10 2/10 2/10                       HEP    Table slide into flexion 2x10  2/10 2/10 2/10 2/10   pulleys     3 min   Finger ladder     5x   Sub max iso - IR/ER/Ext     2/10 50%   Ther Activity                        Gait Training                        Modalities        CP x10 min  X 10 min 10 min 10 min 10 min   IFC PRN                   Access Code: [email or web address]  URL: [email or web address]  Date: 05/18/2022  Prepared by: Elaine Ovidio    Exercises  · Seated Scapular Retraction - 1 x daily - 7 x weekly - 3 sets - 10 reps  · Seated Shoulder Shrugs - 1 x daily - 7 x weekly - 3 sets - 10 reps  · Wrist Flexion AROM - 1 x daily - 7 x weekly - 3 sets - 10 reps  · Wrist AROM Radial Ulnar Deviation - 1 x daily - 7 x weekly - 3 sets - 10 reps  · Wrist Flexion Extension AROM with Fingers Curled and Palm Down - 1 x daily - 7 x weekly - 3 sets - 10 reps  · Circular Shoulder Pendulum with Table Support - 1 x daily - 7 x weekly - 3 sets - 10 reps

## 2022-06-17 ENCOUNTER — APPOINTMENT (OUTPATIENT)
Dept: PHYSICAL THERAPY | Facility: CLINIC | Age: 67
End: 2022-06-17
Payer: MEDICARE

## 2022-06-20 ENCOUNTER — OFFICE VISIT (OUTPATIENT)
Dept: PHYSICAL THERAPY | Facility: CLINIC | Age: 67
End: 2022-06-20
Payer: MEDICARE

## 2022-06-20 DIAGNOSIS — M75.102 TEAR OF LEFT ROTATOR CUFF, UNSPECIFIED TEAR EXTENT, UNSPECIFIED WHETHER TRAUMATIC: Primary | ICD-10-CM

## 2022-06-20 DIAGNOSIS — Z48.89 AFTERCARE FOLLOWING SURGERY: ICD-10-CM

## 2022-06-20 PROCEDURE — 97140 MANUAL THERAPY 1/> REGIONS: CPT

## 2022-06-20 PROCEDURE — 97110 THERAPEUTIC EXERCISES: CPT

## 2022-06-20 PROCEDURE — 97112 NEUROMUSCULAR REEDUCATION: CPT

## 2022-06-20 NOTE — PROGRESS NOTES
Daily Note     Today's date: 2022  Patient name: Jinny Santoyo  : 1955  MRN: [id number]  Referring provider: Cary Hurst DO  Dx:   Encounter Diagnosis     ICD-10-CM    1  Tear of left rotator cuff, unspecified tear extent, unspecified whether traumatic  M75 102    2  Aftercare following surgery  Z48 89                   Subjective: Patient notes that he is doing well at this time with no complaints  Objective: See treatment diary below      Assessment: Tolerated treatment well  Patient would benefit from continued PT  Able to progress to sidely ER and prone scapular stabilization exercise this date  Plan: Continue per plan of care             Manuals    PROM left shoulder CD CD CD CD CD   PROM left elbow with stretch into endranges D/c CD CD CD D/C   ST jt mob xxx CD CD CD CD   sidely scap retraction/ medial rotation MRE 2/10 2/10 1/15 2/10 2/10   Neuro Re-Ed         Active scap retraction/  elevation MRE @ scap 2x10 each  MRE 2/10 MRE 2/10 MRE 2/10 MRE @ scap 2/10   Supine wand chest press AAROM Standing 2x10 Chest press 1/15  Abduction 1/5 Chest press 2/10  Abduction 1/10 Chest press 2/10  Abduction 1/10 Standing flex/ abd 2/10   Prone- ext/ abduction 2/10                                       Ther Ex        pendulums 2x10  2 x 10 ea 2/10 2/10 2/10   Sidely ER 2/10                   HEP    Table slide into flexion 2x10  2/10 2/10 2/10 2/10   pulleys 3 min    3 min   Finger ladder 5x    5x   Sub max iso - IR/ER/Ext 2/10 50%    2/10 50%   Ther Activity                        Gait Training                        Modalities        CP x10 min  X 10 min 10 min 10 min 10 min   IFC PRN

## 2022-06-22 ENCOUNTER — APPOINTMENT (OUTPATIENT)
Dept: PHYSICAL THERAPY | Facility: CLINIC | Age: 67
End: 2022-06-22
Payer: MEDICARE

## 2022-06-23 ENCOUNTER — OFFICE VISIT (OUTPATIENT)
Dept: PHYSICAL THERAPY | Facility: CLINIC | Age: 67
End: 2022-06-23
Payer: MEDICARE

## 2022-06-23 DIAGNOSIS — Z48.89 AFTERCARE FOLLOWING SURGERY: ICD-10-CM

## 2022-06-23 DIAGNOSIS — M75.102 TEAR OF LEFT ROTATOR CUFF, UNSPECIFIED TEAR EXTENT, UNSPECIFIED WHETHER TRAUMATIC: Primary | ICD-10-CM

## 2022-06-23 PROCEDURE — 97110 THERAPEUTIC EXERCISES: CPT

## 2022-06-23 PROCEDURE — 97112 NEUROMUSCULAR REEDUCATION: CPT

## 2022-06-23 PROCEDURE — 97140 MANUAL THERAPY 1/> REGIONS: CPT

## 2022-06-23 NOTE — PROGRESS NOTES
Daily Note     Today's date: 2022  Patient name: Gage Negrete  : 1955  MRN: [id number]  Referring provider: Priscilla Fox DO  Dx:   Encounter Diagnosis     ICD-10-CM    1  Tear of left rotator cuff, unspecified tear extent, unspecified whether traumatic  M75 102    2  Aftercare following surgery  Z48 89                   Subjective: Patient notes pain at worst has been a 1/10 recently  He notes that he is compliant with his HEP  Objective: See treatment diary below      Assessment: Tolerated treatment well  Patient would benefit from continued PT      Plan: Continue per plan of care             Manuals    PROM left shoulder CD CD CD CD CD   PROM left elbow with stretch into endranges D/c CD CD CD D/C   ST jt mob xxx CD CD CD CD   sidely scap retraction/ medial rotation MRE 2/10 2/10 1/15 2/10 2/10   Neuro Re-Ed         Active scap retraction/  elevation MRE @ scap 2x10 each  MRE 2/10 MRE 2/10 MRE 2/10 MRE @ scap 2/10   Supine wand chest press AAROM Standing 2x10 Standing flex/abd 2/10 Chest press 2/10  Abduction 1/10 Chest press 2/10  Abduction 1/10 Standing flex/ abd 2/10   Prone- ext/ abduction 2/10 2/10                                      Ther Ex        pendulums 2x10  2 x 10 ea 2/10 2/10 2/10   Sidely ER 2/10 2/10                  HEP    Table slide into flexion 2x10  2/10 2/10 2/10 2/10   pulleys 3 min 3 min   3 min   Finger ladder 5x 5x   5x   Sub max iso - IR/ER/Ext 2/10 50% 2/10 50%   2/10 50%   Ther Activity                        Gait Training                        Modalities        CP x10 min  X 10 min 10 min 10 min 10 min   IFC PRN

## 2022-06-24 ENCOUNTER — APPOINTMENT (OUTPATIENT)
Dept: PHYSICAL THERAPY | Facility: CLINIC | Age: 67
End: 2022-06-24
Payer: MEDICARE

## 2022-06-27 ENCOUNTER — OFFICE VISIT (OUTPATIENT)
Dept: PHYSICAL THERAPY | Facility: CLINIC | Age: 67
End: 2022-06-27
Payer: MEDICARE

## 2022-06-27 DIAGNOSIS — Z48.89 AFTERCARE FOLLOWING SURGERY: ICD-10-CM

## 2022-06-27 DIAGNOSIS — M75.102 TEAR OF LEFT ROTATOR CUFF, UNSPECIFIED TEAR EXTENT, UNSPECIFIED WHETHER TRAUMATIC: Primary | ICD-10-CM

## 2022-06-27 PROCEDURE — 97112 NEUROMUSCULAR REEDUCATION: CPT | Performed by: PHYSICAL THERAPIST

## 2022-06-27 PROCEDURE — 97110 THERAPEUTIC EXERCISES: CPT | Performed by: PHYSICAL THERAPIST

## 2022-06-27 PROCEDURE — 97140 MANUAL THERAPY 1/> REGIONS: CPT | Performed by: PHYSICAL THERAPIST

## 2022-06-27 NOTE — PROGRESS NOTES
Daily Note     Today's date: 2022  Patient name: Erika Hopper  : 1955  MRN: [id number]  Referring provider: Nagi Langley DO  Dx:   Encounter Diagnosis     ICD-10-CM    1  Tear of left rotator cuff, unspecified tear extent, unspecified whether traumatic  M75 102    2  Aftercare following surgery  Z48 89                   Subjective: The patient states that his shoulder is feeling good, minimal pain with moving certain directions  He will be going back to see the doctor on Thursday for his follow up appointment  Objective: See treatment diary below      Assessment: Tolerated treatment well  He is limited with PROM for all planes with pain noted at end range  Despite pain with stretching he had no pain at end of session after CP  Patient would benefit from continued PT to improve function and mobility  Plan: Continue per plan of care  He is to see the doctor on Thursday and to await any recommendations              Manuals    PROM left shoulder CD CD ML CD CD   PROM left elbow with stretch into endranges D/c CD ML CD D/C   ST jt mob xxx CD ML CD CD   sidely scap retraction/ medial rotation MRE 2/10 2/10 2 x 10 2/10 2/10   Neuro Re-Ed         Active scap retraction/  elevation MRE @ scap 2x10 each  MRE 2/10 MRE 2/10 MRE 2/10 MRE @ scap 2/10   Supine wand chest press AAROM Standing 2x10 Standing flex/abd 2/10 Standing Flex/Abd 2 x 10 ea Chest press 2/10  Abduction 1/10 Standing flex/ abd 2/10   Prone- ext/ abduction 2/10 2/10 2 x 10 ea                                     Ther Ex        pendulums 2x10  2 x 10 ea D/C 2/10 2/10   Sidely ER 2/10 2/10 2 x 10                 HEP    Table slide into flexion 2x10  2/10 D/C 2/10 2/10   pulleys 3 min 3 min 3 mins  3 min   Finger ladder 5x 5x 5x  5x   Sub max iso - IR/ER/Ext 2/10 50% 2/10 50% 50% 2 x 10  Man resist  2/10 50%   Ther Activity                        Gait Training                        Modalities CP 10 min  10 min 10 min 10 min 10 min   IFC PRN

## 2022-06-29 ENCOUNTER — APPOINTMENT (OUTPATIENT)
Dept: PHYSICAL THERAPY | Facility: CLINIC | Age: 67
End: 2022-06-29
Payer: MEDICARE

## 2022-06-30 ENCOUNTER — OFFICE VISIT (OUTPATIENT)
Dept: OBGYN CLINIC | Facility: CLINIC | Age: 67
End: 2022-06-30

## 2022-06-30 ENCOUNTER — APPOINTMENT (OUTPATIENT)
Dept: PHYSICAL THERAPY | Facility: CLINIC | Age: 67
End: 2022-06-30
Payer: MEDICARE

## 2022-06-30 VITALS — WEIGHT: 209 LBS | BODY MASS INDEX: 33.59 KG/M2 | HEIGHT: 66 IN

## 2022-06-30 DIAGNOSIS — Z98.890 S/P ARTHROSCOPY OF LEFT SHOULDER: Primary | ICD-10-CM

## 2022-06-30 PROCEDURE — 99024 POSTOP FOLLOW-UP VISIT: CPT | Performed by: ORTHOPAEDIC SURGERY

## 2022-06-30 NOTE — PROGRESS NOTES
ASSESSMENT/PLAN:    Diagnoses and all orders for this visit:    S/P arthroscopy of left shoulder        The patient is continuing to do well since surgery  He may now participate in active range of motion  He should continue physical therapy and occupational therapy for strengthening, stretching range of motion  He will follow up with our office in 6 weeks  He no longer needs to use the shoulder immobilizer  The patient is acceptable to this plan  Return in about 1 month (around 7/30/2022)  The patient is doing quite well from his left shoulder arthroscopy and rotator cuff repair  He has nearly full strength full motion  He may start active range of motion program   The immobilizer be discontinued  Follow-up in 6 weeks for strength and motion check  If his condition changes, he will not hesitate to let us know      _____________________________________________________  CHIEF COMPLAINT:  Chief Complaint   Patient presents with    Left Shoulder - Follow-up         SUBJECTIVE:  Tavia Terrell is a 74 y o  male who presents to our office for postop visit  The patient is status post left shoulder arthroscopy with rotator cuff repair from 05/16/2022  He denies any significant pain  He is very pleased with the results from surgery  He has improved range of motion strength  He denies any numbness or tingling  He denies any fever or chills        The following portions of the patient's history were reviewed and updated as appropriate: allergies, current medications, past family history, past medical history, past social history, past surgical history and problem list     PAST MEDICAL HISTORY:  Past Medical History:   Diagnosis Date    Arthritis     Rotator cuff injury     left shoulder    Seasonal allergies        PAST SURGICAL HISTORY:  Past Surgical History:   Procedure Laterality Date    COLOGUARD (HISTORICAL)      JOINT REPLACEMENT Right 2018    MULTIPLE TOOTH EXTRACTIONS      in office under local    MI SHLDR ARTHROSCOP,SURG,W/ROTAT CUFF REPR Left 5/16/2022    Procedure: LEFT SHOULDER ARTHROSCOPY WITH POSSIBLE ROTATOR CUFF REPAIR;  Surgeon: Ernie Kebede DO;  Location: [facility] MA[facility] OR;  Service: Orthopedics    MI TOTAL KNEE ARTHROPLASTY Right 02/26/2019    Procedure: ARTHROPLASTY KNEE TOTAL;  Surgeon: Simon Acevedo DO;  [facility] MA[facility] OR;  Service: Orthopedics    MI TOTAL KNEE ARTHROPLASTY Left 01/13/2021    Procedure: KNEE TOTAL ARTHROPLASTY;  Surgeon: Simon Acevedo DO;  [facility] MAIN OR;  Service: Orthopedics    TONSILLECTOMY         FAMILY HISTORY:  Family History   Problem Relation Age of Onset    No Known Problems Mother     Ulcerative colitis Father     Ulcers Father     No Known Problems Sister     No Known Problems Brother        SOCIAL HISTORY:  Social History     Tobacco Use    Smoking status: Light Tobacco Smoker     Packs/day: 0 25     Years: 51 00     Pack years: 12 75     Types: Cigarettes    Smokeless tobacco: Current User    Tobacco comment: one pack a week   Vaping Use    Vaping Use: Never used   Substance Use Topics    Alcohol use: Yes     Alcohol/week: 4 0 standard drinks     Types: 3 Cans of beer, 1 Shots of liquor per week     Comment: 4-6 drinks a week    Drug use: Never       MEDICATIONS:    Current Outpatient Medications:     ibuprofen (MOTRIN) 600 mg tablet, Take by mouth every 6 (six) hours as needed for mild pain, Disp: , Rfl:     pseudoephedrine (SUDAFED) 120 MG 12 hr tablet, Take 120 mg by mouth if needed, Disp: , Rfl:     ascorbic acid (VITAMIN C) 500 MG tablet, Take 1 tablet (500 mg total) by mouth 2 (two) times a day (Patient not taking: Reported on 6/2/2022), Disp: 60 tablet, Rfl: 1    ALLERGIES:  Allergies   Allergen Reactions    Other Allergic Rhinitis     Mold Spores       ROS:  Review of Systems     Constitutional: Negative for fatigue, fever or loss of appetite  HENT: Negative  Respiratory: Negative for shortness of breath, dyspnea  Cardiovascular: Negative for chest pain/tightness  Gastrointestinal: Negative for abdominal pain, N/V  Endocrine: Negative for cold/heat intolerance, unexplained weight loss/gain  Genitourinary: Negative for flank pain, dysuria, hematuria  Musculoskeletal:  Negative for arthralgia   Skin: Negative for rash  Neurological: Negative for numbness or tingling  Psychiatric/Behavioral: Negative for agitation  _____________________________________________________  PHYSICAL EXAMINATION:    Height 5' 6" (1 676 m), weight 94 8 kg (209 lb)  Constitutional: Oriented to person, place, and time  Appears well-developed and well-nourished  No distress  HENT:   Head: Normocephalic  Eyes: Conjunctivae are normal  Right eye exhibits no discharge  Left eye exhibits no discharge  No scleral icterus  Cardiovascular: Normal rate  Pulmonary/Chest: Effort normal    Neurological: Alert and oriented to person, place, and time  Skin: Skin is warm and dry  No rash noted  Not diaphoretic  No erythema  No pallor  Psychiatric: Normal mood and affect  Behavior is normal  Judgment and thought content normal       MUSCULOSKELETAL EXAMINATION:   Physical Exam  Ortho Exam    Left upper extremity is neurovascularly intact  Fingers are pink and mobile  Compartments are soft  Range of motion of the shoulder is from 0-170 degrees of forward flexion and abduction  Rotator cuff strength is improved  Incisions are well healed  Brisk cap refill  Sensation intact  Objective:  BP Readings from Last 1 Encounters:   06/02/22 150/90      Wt Readings from Last 1 Encounters:   06/30/22 94 8 kg (209 lb)        BMI:   Estimated body mass index is 33 73 kg/m² as calculated from the following:    Height as of this encounter: 5' 6" (1 676 m)  Weight as of this encounter: 94 8 kg (209 lb)            Scribe Attestation    I,:  Jojo Murdock PA-C am acting as a scribe while in the presence of the attending physician :       I,:  Cristine Beyer Luis Armando Ibrahim DO personally performed the services described in this documentation    as scribed in my presence :

## 2022-07-01 ENCOUNTER — APPOINTMENT (OUTPATIENT)
Dept: PHYSICAL THERAPY | Facility: CLINIC | Age: 67
End: 2022-07-01
Payer: MEDICARE

## 2022-07-06 ENCOUNTER — OFFICE VISIT (OUTPATIENT)
Dept: PHYSICAL THERAPY | Facility: CLINIC | Age: 67
End: 2022-07-06
Payer: MEDICARE

## 2022-07-06 DIAGNOSIS — M75.102 TEAR OF LEFT ROTATOR CUFF, UNSPECIFIED TEAR EXTENT, UNSPECIFIED WHETHER TRAUMATIC: Primary | ICD-10-CM

## 2022-07-06 DIAGNOSIS — Z48.89 AFTERCARE FOLLOWING SURGERY: ICD-10-CM

## 2022-07-06 PROCEDURE — 97112 NEUROMUSCULAR REEDUCATION: CPT

## 2022-07-06 PROCEDURE — 97110 THERAPEUTIC EXERCISES: CPT

## 2022-07-06 PROCEDURE — 97140 MANUAL THERAPY 1/> REGIONS: CPT

## 2022-07-06 NOTE — PROGRESS NOTES
Daily Note     Today's date: 2022  Patient name: Katrina Pedroza  : 1955  MRN: [id number]  Referring provider: Shakir El DO  Dx:   Encounter Diagnosis     ICD-10-CM    1  Tear of left rotator cuff, unspecified tear extent, unspecified whether traumatic  M75 102    2  Aftercare following surgery  Z48 89                   Subjective: Patient reports he L shoulder has been feeling good  Patient states he had his f/u last Thursday and surgeon is happy with the progress he has been making  He cleared him from the sling use  Katrina Pedroza says OH abduction is still a struggle for him  Objective: See treatment diary below      Assessment: Tolerated treatment well with no significant increase in L shoulder pain  Patient most challenged by supine sustained stretches today  Patient required moderate verbal and visual cues to perform exercises with appropriate technique and intensity  Patient continues to demonstrate moderate L shoulder ROM limitations in all directions- especially OH  Patient requires verbal cues to push self into stretch with AAROM activities, Patient would benefit from continued PT to increase L shoulder strength and ROM for improved function in ADLs  Plan: Continue per plan of care             Manuals    PROM left shoulder CD CD ML AS CD   PROM left elbow with stretch into endranges D/c CD ML AS D/C   ST jt mob xxx CD ML np CD   sidely scap retraction/ medial rotation MRE 2/10 2/10 2 x 10 2x10 2/10   Neuro Re-Ed         Active scap retraction/  elevation MRE @ scap 2x10 each  MRE 2/10 MRE 2/10 OTB MTP c scap ret 2x10 MRE @ scap 2/10   Supine wand chest press AAROM Standing 2x10 Standing flex/abd 2/10 Standing Flex/Abd 2 x 10 ea Supine silvio stretch flex/ER 10"x10 ea Standing flex/ abd 2/10   Prone- ext/ abduction /10 2/10 2 x 10 ea 2 x 10 ea                                    Ther Ex        pendulums 2x10  2 x 10 ea D/C  2/10   Sidely ER 2/10 2/10 2 x 10 1# 2 x 10                    Table slide into flexion 2x10  2/10 D/C  2/10   pulleys 3 min 3 min 3 mins 3 mins 3 min   Finger ladder 5x 5x 5x AROM 5x ea scap and flex, ecc lowering 5x   Sub max iso - IR/ER/Ext 2/10 50% 2/10 50% 50% 2 x 10  Man resist 50% 2 x 10  Man resist 2/10 50%   Ther Activity                        Gait Training                        Modalities        CP 10 min  10 min 10 min 10 min 10 min   IFC PRN

## 2022-07-08 ENCOUNTER — OFFICE VISIT (OUTPATIENT)
Dept: PHYSICAL THERAPY | Facility: CLINIC | Age: 67
End: 2022-07-08
Payer: MEDICARE

## 2022-07-08 DIAGNOSIS — M75.102 TEAR OF LEFT ROTATOR CUFF, UNSPECIFIED TEAR EXTENT, UNSPECIFIED WHETHER TRAUMATIC: Primary | ICD-10-CM

## 2022-07-08 DIAGNOSIS — Z48.89 AFTERCARE FOLLOWING SURGERY: ICD-10-CM

## 2022-07-08 PROCEDURE — 97110 THERAPEUTIC EXERCISES: CPT | Performed by: PHYSICAL THERAPIST

## 2022-07-08 PROCEDURE — 97140 MANUAL THERAPY 1/> REGIONS: CPT | Performed by: PHYSICAL THERAPIST

## 2022-07-08 NOTE — PROGRESS NOTES
Daily Note     Today's date: 2022  Patient name: Kitty Michelle  : 1955  MRN: [id number]  Referring provider: Del Patricio DO  Dx:   Encounter Diagnosis     ICD-10-CM    1  Tear of left rotator cuff, unspecified tear extent, unspecified whether traumatic  M75 102    2  Aftercare following surgery  Z48 89        Start Time: 930  Stop Time: 1005  Total time in clinic (min): 35 minutes    Subjective: Patient reports that he is doing well and that he has been using the arm more at home  He feels that his shoulder is getting stronger and he is able to reach overhead without much difficulties  Objective: See treatment diary below      Assessment: Tolerated treatment well  Patient exhibited good technique with therapeutic exercises and would benefit from continued PT Patient continues to show good progression of the L shoulder mobility both actively and passively  He felt the most fatigue with t-band ER isometric walk out during the session  Reviewed HEP with the patient today  Plan: Continue per plan of care  Progress treatment as tolerated  Progress treament per protocol             Manuals    PROM left shoulder CD CD ML AS 10 min SC   PROM left elbow with stretch into endranges D/c CD ML AS    ST jt mob xxx CD ML np    sidely scap retraction/ medial rotation MRE 2/10 2/10 2 x 10 2x10    Neuro Re-Ed         Active scap retraction/  elevation MRE @ scap 2x10 each  MRE 2/10 MRE 2/10 OTB MTP c scap ret 2x10    Supine wand chest press AAROM Standing 2x10 Standing flex/abd 2/10 Standing Flex/Abd 2 x 10 ea Supine silvio stretch flex/ER 10"x10 ea    Prone- ext/ abduction 2/10 2/10 2 x 10 ea 2 x 10 ea                                    Ther Ex        pendulums 2x10  2 x 10 ea D/C     Sidely ER 2/10 2/10 2 x 10 1# 2 x 10    t-band walkouts IR/ER     Blue 20x/[id number]   t-band rows, CRISTAL, tricep ext     Blue 2/20x   Sidelying ER MRE 2x10  2/10 D/C  20x   pulleys 3 min 3 min 3 mins 3 mins 3 min   UBE 5x 5x 5x AROM 5x ea scap and flex, ecc lowering 5 min   Sub max iso - IR/ER/Ext 2/10 50% 2/10 50% 50% 2 x 10  Man resist 50% 2 x 10  Man resist    Ther Activity                        Gait Training                        Modalities        CP 10 min  10 min 10 min 10 min    IFC PRN

## 2022-07-11 ENCOUNTER — OFFICE VISIT (OUTPATIENT)
Dept: PHYSICAL THERAPY | Facility: CLINIC | Age: 67
End: 2022-07-11
Payer: MEDICARE

## 2022-07-11 DIAGNOSIS — M75.102 TEAR OF LEFT ROTATOR CUFF, UNSPECIFIED TEAR EXTENT, UNSPECIFIED WHETHER TRAUMATIC: Primary | ICD-10-CM

## 2022-07-11 DIAGNOSIS — Z48.89 AFTERCARE FOLLOWING SURGERY: ICD-10-CM

## 2022-07-11 PROCEDURE — 97112 NEUROMUSCULAR REEDUCATION: CPT

## 2022-07-11 PROCEDURE — 97140 MANUAL THERAPY 1/> REGIONS: CPT

## 2022-07-11 PROCEDURE — 97110 THERAPEUTIC EXERCISES: CPT

## 2022-07-11 NOTE — PROGRESS NOTES
Daily Note     Today's date: 2022  Patient name: Lisa Jackson  : 1955  MRN: [id number]  Referring provider: Tiki Diaz DO  Dx:   Encounter Diagnosis     ICD-10-CM    1  Tear of left rotator cuff, unspecified tear extent, unspecified whether traumatic  M75 102    2  Aftercare following surgery  Z48 89                   Subjective: Patient notes he is compliant with avoiding OH motion with resistance  He feels he is doing well overall at this time  Objective: See treatment diary below      Assessment: Tolerated treatment well  Patient would benefit from continued PT  He is able to complete exercise nicely  Pain is noted at end range motion of the shoulder and with ER isometric walk outs  Plan: Continue per plan of care             Manuals    PROM left shoulder CD CD ML AS 10 min SC   PROM left elbow with stretch into endranges D/c CD ML AS    ST jt mob xxx CD ML np    sidely scap retraction/ medial rotation MRE xxx 2/10 2 x 10 2x10    Neuro Re-Ed         Active scap retraction/  elevation D/C MRE 2/10 MRE 2/10 OTB MTP c scap ret 2x10    Supine wand chest press AAROM D/C Standing flex/abd 2/10 Standing Flex/Abd 2 x 10 ea Supine silvio stretch flex/ER 10"x10 ea    Prone- ext/ abduction 2/10 2/10 2 x 10 ea 2 x 10 ea    Supine static stabilization 30x 2                               Ther Ex        pendulums 2x10  2 x 10 ea D/C     Sidely ER 2/10 2/10 2 x 10 1# 2 x 10    t-band walkouts IR/ER Blue 20x/ Orange 20x    Blue 20x/[id number]   t-band rows, CRISTAL, tricep ext Blue 2x20x    Blue 2/20x   Sidelying ER MRE 2x10  2/10 D/C  20x   pulleys 3 min 3 min 3 mins 3 mins 3 min   UBE 5min  2 5/2 5 5x 5x AROM 5x ea scap and flex, ecc lowering 5 min   Sub max iso - IR/ER/Ext HEP 2/10 50% 50% 2 x 10  Man resist 50% 2 x 10  Man resist    Wand chest press 3 lbs x20               Ther Activity                        Gait Training                        Modalities        CP 10 min 10 min 10 min 10 min    IFC PRN

## 2022-07-14 ENCOUNTER — EVALUATION (OUTPATIENT)
Dept: PHYSICAL THERAPY | Facility: CLINIC | Age: 67
End: 2022-07-14
Payer: MEDICARE

## 2022-07-14 DIAGNOSIS — M75.102 TEAR OF LEFT ROTATOR CUFF, UNSPECIFIED TEAR EXTENT, UNSPECIFIED WHETHER TRAUMATIC: Primary | ICD-10-CM

## 2022-07-14 DIAGNOSIS — Z48.89 AFTERCARE FOLLOWING SURGERY: ICD-10-CM

## 2022-07-14 PROCEDURE — 97110 THERAPEUTIC EXERCISES: CPT

## 2022-07-14 PROCEDURE — 97112 NEUROMUSCULAR REEDUCATION: CPT

## 2022-07-14 NOTE — LETTER
2022    Jamilah Mosley DO  [address] [facility]  [address]    Patient: Siva Stanley   YOB: 1955   Date of Visit: 2022     Encounter Diagnosis     ICD-10-CM    1  Tear of left rotator cuff, unspecified tear extent, unspecified whether traumatic  M75 102    2  Aftercare following surgery  Z48 89        Dear Dr Chester Garcia:    Thank you for your recent referral of [facility]  Please review the attached evaluation summary from Gene's recent visit  Please verify that you agree with the plan of care by signing the attached order  If you have any questions or concerns, please do not hesitate to call  I sincerely appreciate the opportunity to share in the care of one of your patients and hope to have another opportunity to work with you in the near future  Sincerely,    Santana Morgan, PT      Referring Provider:      I certify that I have read the below Plan of Care and certify the need for these services furnished under this plan of treatment while under my care  Jamilah Mosley DO  [address] [address]  [place] [place]          PT Re-Evaluation     Today's date: 2022  Patient name: Siva Stanley  : 1955  MRN: [id number]  Referring provider: Clarence Baptiste DO  Dx:   Encounter Diagnosis     ICD-10-CM    1  Tear of left rotator cuff, unspecified tear extent, unspecified whether traumatic  M75 102    2  Aftercare following surgery  Z48 89                   Assessment  Assessment details: Patient is a 74 y o  male who has been seen at PT for 17 visits since [facility] following left RC repair on 22  Patient is progressing nicely toward all goals with increased strength and AROM  He has minimal tenderness that persists at the shoulder  He is able to complete OH motion but notes discomfort completing this motion   Patient is compliant with his HEP and continues to progress toward all goals    Impairments: abnormal or restricted ROM and pain with function    Goals  STG - 4 weeks - all STG met  Patient to report decreased pain to 2/10 with function of the left shoulder for dressing/hygene tasks  Patient able to complete self dressing tasks of the upper body without assistance  Patient able to complete hygiene tasks without assistance  Patient to have increased left shoulder PROM to 90degrees into flexion  LTG - 12 weeks - progression toward all goals is noted  Patient able to demonstrate strength of the left shoulder into flexion/ abduction to 4/5  Patient to demonstrate increased strength of the left shoulder into ER to 4/5  Patient able to demonstrate left shoulder AROM to WNL for completion of functional tasks  Patient to note pain at worst with activity to be 1/10 SPR  Patient to demonstrate posture in sitting and standing WNL  Patient to be d/c to final/ revised HEP  Plan  Plan details: Patient's evaluation is completed  Patient was instructed with a HEP this date  Patient has scheduled further PT sessions for treatment  Patient would benefit from: skilled physical therapy  Planned modality interventions: ultrasound, unattended electrical stimulation and cryotherapy  Planned therapy interventions: manual therapy, neuromuscular re-education, therapeutic exercise, therapeutic training and home exercise program  Frequency: 2x week  Duration in weeks: 8  Plan of Care beginning date: 7/14/2022  Plan of Care expiration date: 9/16/2022        Subjective Evaluation    History of Present Illness  Mechanism of injury: Patient notes that he tore the left RC in February 2022 when he lifted a heavy bucket  He underwent surgery on 5/16/22 for a large cuff repair  He notes that prior to surgery he was limited in tolerance to active shoulder movement  He notes that he did have a hx of small RC tears in the left shoulder which improved with PT intervention   He notes compliance with his post-op sling  UPDATE 22  Patient notes that "everything" is challenging because he is not to actively elevate his shoulder  Patient notes that he is able to wash dishes and fold socks  UPDATE 22  Patient notes no new issues this date  He feels his shoulder movement is improving  He notes OH reaching does remain somewhat difficult    Pain  Current pain ratin  At worst pain rating: 3  Quality: dull ache    Patient Goals  Patient goals for therapy: decreased pain, increased motion, increased strength and independence with ADLs/IADLs          Objective     Cervical/Thoracic Screen   Cervical range of motion within normal limits    Active Range of Motion   Left Shoulder   Flexion: 155 degrees   Abduction: 148 degrees   External rotation 45°: 70 degrees   Internal rotation 0°: WFL    Right Shoulder   Flexion: 160 degrees     Additional Active Range of Motion Details  AROM was not measured due to protocol  AROM of the wrist was WNL  Able to complete fist closure  Elbow AROM 0-120    Passive Range of Motion   Left Shoulder   Flexion: 150 degrees   Abduction: 130 degrees with pain  External rotation 45°: 45 degrees with pain  Internal rotation 45°: WFL    Strength/Myotome Testing     Left Shoulder     Planes of Motion   Flexion: 4   Extension: 4+   Abduction: 4-   Adduction: 4   External rotation at 0°: 4-   Internal rotation at 0°: 4     Right Shoulder   Normal muscle strength    Additional Strength Details  No strength testing completed at first post-op session                  Manuals    PROM left shoulder CD CD ML AS 10 min SC   PROM left elbow with stretch into endranges D/c CD ML AS    ST jt mob xxx xxx ML np    sidely scap retraction/ medial rotation MRE xxx xxx 2 x 10 2x10    Neuro Re-Ed         Active scap retraction/  elevation D/C xxx MRE 2/10 OTB MTP c scap ret 2x10    Supine wand chest press AAROM D/C xxx Standing Flex/Abd 2 x 10 ea Supine silvio stretch flex/ER 10"x10 ea Prone- ext/ abduction 2/10 2/10 2 x 10 ea 2 x 10 ea    Supine static stabilization 30x 2 30x 2                              Ther Ex        pendulums 2x10  2 x 10 ea D/C     Sidely ER 2/10 2/10 2 x 10 1# 2 x 10    t-band walkouts IR/ER Blue 20x/ Orange 20x Blue 20x/ Orange 20x   Blue 20x/Nphcux08i   t-band rows, CRISTAL, tricep ext Blue 2x20x Blue 2 x20   Blue 2/20x   Sidelying ER MRE 2x10  2/10 D/C  20x   pulleys 3 min 3 min 3 mins 3 mins 3 min   UBE 5min  2 5/2 5 5min 5x AROM 5x ea scap and flex, ecc lowering 5 min   Sub max iso - IR/ER/Ext HEP xxx 50% 2 x 10  Man resist 50% 2 x 10  Man resist    Wand chest press 3 lbs x20               Ther Activity                        Gait Training                        Modalities        CP 10 min  10 min 10 min 10 min    IFC PRN                   Access Code: [email or web address]  URL: [email or web address]  Date: 05/18/2022  Prepared by: Zaheer     Exercises  · Seated Scapular Retraction - 1 x daily - 7 x weekly - 3 sets - 10 reps  · Seated Shoulder Shrugs - 1 x daily - 7 x weekly - 3 sets - 10 reps  · Wrist Flexion AROM - 1 x daily - 7 x weekly - 3 sets - 10 reps  · Wrist AROM Radial Ulnar Deviation - 1 x daily - 7 x weekly - 3 sets - 10 reps  · Wrist Flexion Extension AROM with Fingers Curled and Palm Down - 1 x daily - 7 x weekly - 3 sets - 10 reps  · Circular Shoulder Pendulum with Table Support - 1 x daily - 7 x weekly - 3 sets - 10 reps

## 2022-07-14 NOTE — PROGRESS NOTES
PT Re-Evaluation     Today's date: 2022  Patient name: Destiney Vu  : 1955  MRN: [id number]  Referring provider: Ventura Jimenez DO  Dx:   Encounter Diagnosis     ICD-10-CM    1  Tear of left rotator cuff, unspecified tear extent, unspecified whether traumatic  M75 102    2  Aftercare following surgery  Z48 89                   Assessment  Assessment details: Patient is a 74 y o  male who has been seen at PT for 17 visits since [facility] following left RC repair on 22  Patient is progressing nicely toward all goals with increased strength and AROM  He has minimal tenderness that persists at the shoulder  He is able to complete OH motion but notes discomfort completing this motion  Patient is compliant with his HEP and continues to progress toward all goals  Impairments: abnormal or restricted ROM and pain with function    Goals  STG - 4 weeks - all STG met  Patient to report decreased pain to 2/10 with function of the left shoulder for dressing/hygene tasks  Patient able to complete self dressing tasks of the upper body without assistance  Patient able to complete hygiene tasks without assistance  Patient to have increased left shoulder PROM to 90degrees into flexion  LTG - 12 weeks - progression toward all goals is noted  Patient able to demonstrate strength of the left shoulder into flexion/ abduction to 4/5  Patient to demonstrate increased strength of the left shoulder into ER to 4/5  Patient able to demonstrate left shoulder AROM to WNL for completion of functional tasks  Patient to note pain at worst with activity to be 1/10 SPR  Patient to demonstrate posture in sitting and standing WNL  Patient to be d/c to final/ revised HEP  Plan  Plan details: Patient's evaluation is completed  Patient was instructed with a HEP this date  Patient has scheduled further PT sessions for treatment     Patient would benefit from: skilled physical therapy  Planned modality interventions: ultrasound, unattended electrical stimulation and cryotherapy  Planned therapy interventions: manual therapy, neuromuscular re-education, therapeutic exercise, therapeutic training and home exercise program  Frequency: 2x week  Duration in weeks: 8  Plan of Care beginning date: 2022  Plan of Care expiration date: 2022        Subjective Evaluation    History of Present Illness  Mechanism of injury: Patient notes that he tore the left RC in 2022 when he lifted a heavy bucket  He underwent surgery on 22 for a large cuff repair  He notes that prior to surgery he was limited in tolerance to active shoulder movement  He notes that he did have a hx of small RC tears in the left shoulder which improved with PT intervention  He notes compliance with his post-op sling  UPDATE 22  Patient notes that "everything" is challenging because he is not to actively elevate his shoulder  Patient notes that he is able to wash dishes and fold socks  UPDATE 22  Patient notes no new issues this date  He feels his shoulder movement is improving  He notes OH reaching does remain somewhat difficult    Pain  Current pain ratin  At worst pain rating: 3  Quality: dull ache    Patient Goals  Patient goals for therapy: decreased pain, increased motion, increased strength and independence with ADLs/IADLs          Objective     Cervical/Thoracic Screen   Cervical range of motion within normal limits    Active Range of Motion   Left Shoulder   Flexion: 155 degrees   Abduction: 148 degrees   External rotation 45°: 70 degrees   Internal rotation 0°: WFL    Right Shoulder   Flexion: 160 degrees     Additional Active Range of Motion Details  AROM was not measured due to protocol  AROM of the wrist was WNL  Able to complete fist closure  Elbow AROM 0-120    Passive Range of Motion   Left Shoulder   Flexion: 150 degrees   Abduction: 130 degrees with pain  External rotation 45°: 45 degrees with pain  Internal rotation 45°: [facility]    Strength/Myotome Testing     Left Shoulder     Planes of Motion   Flexion: 4   Extension: 4+   Abduction: 4-   Adduction: 4   External rotation at 0°: 4-   Internal rotation at 0°: 4     Right Shoulder   Normal muscle strength    Additional Strength Details  No strength testing completed at first post-op session                  Manuals 7/11 7/14 6/27 7/6 7/8   PROM left shoulder CD CD ML AS 10 min SC   PROM left elbow with stretch into endranges D/c CD ML AS    ST jt mob xxx xxx ML np    sidely scap retraction/ medial rotation MRE xxx xxx 2 x 10 2x10    Neuro Re-Ed         Active scap retraction/  elevation D/C xxx MRE 2/10 OTB MTP c scap ret 2x10    Supine wand chest press AAROM D/C xxx Standing Flex/Abd 2 x 10 ea Supine silvio stretch flex/ER 10"x10 ea    Prone- ext/ abduction 2/10 2/10 2 x 10 ea 2 x 10 ea    Supine static stabilization 30x 2 30x 2                              Ther Ex        pendulums 2x10  2 x 10 ea D/C     Sidely ER 2/10 2/10 2 x 10 1# 2 x 10    t-band walkouts IR/ER Blue 20x/ Orange 20x Blue 20x/ Orange 20x   Blue 20x/Ojhech25x   t-band rows, CRISTAL, tricep ext Blue 2x20x Blue 2 x20   Blue 2/20x   Sidelying ER MRE 2x10  2/10 D/C  20x   pulleys 3 min 3 min 3 mins 3 mins 3 min   UBE 5min  2 5/2 5 5min 5x AROM 5x ea scap and flex, ecc lowering 5 min   Sub max iso - IR/ER/Ext HEP xxx 50% 2 x 10  Man resist 50% 2 x 10  Man resist    Wand chest press 3 lbs x20               Ther Activity                        Gait Training                        Modalities        CP 10 min  10 min 10 min 10 min    IFC PRN                   Access Code: [email or web address]  URL: [email or web address]  Date: 05/18/2022  Prepared by: Joann Fling    Exercises  · Seated Scapular Retraction - 1 x daily - 7 x weekly - 3 sets - 10 reps  · Seated Shoulder Shrugs - 1 x daily - 7 x weekly - 3 sets - 10 reps  · Wrist Flexion AROM - 1 x daily - 7 x weekly - 3 sets - 10 reps  · Wrist AROM Radial Ulnar Deviation - 1 x daily - 7 x weekly - 3 sets - 10 reps  · Wrist Flexion Extension AROM with Fingers Curled and Palm Down - 1 x daily - 7 x weekly - 3 sets - 10 reps  · Circular Shoulder Pendulum with Table Support - 1 x daily - 7 x weekly - 3 sets - 10 reps

## 2022-07-18 ENCOUNTER — OFFICE VISIT (OUTPATIENT)
Dept: PHYSICAL THERAPY | Facility: CLINIC | Age: 67
End: 2022-07-18
Payer: MEDICARE

## 2022-07-18 DIAGNOSIS — Z48.89 AFTERCARE FOLLOWING SURGERY: ICD-10-CM

## 2022-07-18 DIAGNOSIS — M75.102 TEAR OF LEFT ROTATOR CUFF, UNSPECIFIED TEAR EXTENT, UNSPECIFIED WHETHER TRAUMATIC: Primary | ICD-10-CM

## 2022-07-18 PROCEDURE — 97112 NEUROMUSCULAR REEDUCATION: CPT

## 2022-07-18 PROCEDURE — 97110 THERAPEUTIC EXERCISES: CPT

## 2022-07-18 NOTE — PROGRESS NOTES
Daily Note     Today's date: 2022  Patient name: Jim Franklin  : 1955  MRN: [id number]  Referring provider: Jamari Sebastian DO  Dx:   Encounter Diagnosis     ICD-10-CM    1  Tear of left rotator cuff, unspecified tear extent, unspecified whether traumatic  M75 102    2  Aftercare following surgery  Z48 89        Start Time: 1300  Stop Time: 1350  Total time in clinic (min): 50 minutes    Subjective: I am doing ok with my shoulder  I do get some pain times in my shoulder  Objective: See treatment diary below       Assessment: Tolerated treatment well  Patient would benefit from continued PT  Pt reports having very little discomfort today and had minimal fatigue pt reports good tolerance with exercises  today  He had some difficulty with side lying ER today  Patient had a cold pack post for 10 min  Plan: Continue per plan of care             Manuals    PROM left shoulder JF CD ML AS 10 min SC   PROM left elbow with stretch into endranges D/c CD ML AS    ST jt mob xxx xxx ML np    sidely scap retraction/ medial rotation MRE xxx xxx 2 x 10 2x10    Neuro Re-Ed         Active scap retraction/  elevation D/C xxx MRE 2/10 OTB MTP c scap ret 2x10    Supine circles  Abduction/ flexion  15 x  each xxx Standing Flex/Abd 2 x 10 ea Supine silvio stretch flex/ER 10"x10 ea    Prone- ext/ abduction 2/10 2/10 2 x 10 ea 2 x 10 ea    Supine static stabilization 30x 2 30x 2                              Ther Ex        pendulums 2x10  2 x 10 ea D/C     Sidely ER 2/10 1# 2/10 2 x 10 1# 2 x 10    t-band walkouts IR/ER Blue 20x/ Orange 20x Blue 20x/ Orange 20x   Blue 20x/Sbsbfs96z   t-band rows, CRISTAL, tricep ext Blue 2x20x Blue 2 x20   Blue 2/20x   Sidelying ER MRE 2x10  2/10 D/C  20x   pulleys 3 min 3 min 3 mins 3 mins 3 min   UBE 5min  2 5/2 5 5min 5x AROM 5x ea scap and flex, ecc lowering 5 min   Sub max iso - IR/ER/Ext HEP xxx 50% 2 x 10  Man resist 50% 2 x 10  Man resist    Wand chest press/flex 3 lbs x40  3# 20x               Ther Activity                        Gait Training                        Modalities        CP 10 min  10 min 10 min 10 min    IFC PRN

## 2022-07-21 ENCOUNTER — OFFICE VISIT (OUTPATIENT)
Dept: PHYSICAL THERAPY | Facility: CLINIC | Age: 67
End: 2022-07-21
Payer: MEDICARE

## 2022-07-21 DIAGNOSIS — Z48.89 AFTERCARE FOLLOWING SURGERY: Primary | ICD-10-CM

## 2022-07-21 DIAGNOSIS — M75.102 TEAR OF LEFT ROTATOR CUFF, UNSPECIFIED TEAR EXTENT, UNSPECIFIED WHETHER TRAUMATIC: ICD-10-CM

## 2022-07-21 PROCEDURE — 97140 MANUAL THERAPY 1/> REGIONS: CPT

## 2022-07-21 PROCEDURE — 97110 THERAPEUTIC EXERCISES: CPT

## 2022-07-21 PROCEDURE — 97112 NEUROMUSCULAR REEDUCATION: CPT

## 2022-07-21 NOTE — PROGRESS NOTES
Daily Note     Today's date: 2022  Patient name: Armida Sanches  : 1955  MRN: [id number]  Referring provider: Santosh Hurst DO  Dx:   Encounter Diagnosis     ICD-10-CM    1  Aftercare following surgery  Z48 89    2  Tear of left rotator cuff, unspecified tear extent, unspecified whether traumatic  M75 102        Start Time: 1300  Stop Time: 1400  Total time in clinic (min): 60 minutes    Subjective: I feel pretty good today  Objective: See treatment diary below      Assessment: Tolerated treatment well  Patient would benefit from continued PT   Pt reports having some mild soreness today  Most difficulty with ER  He feels he is improving over all  Pt states having his pain over his anterior left shoulder  Plan: Continue per plan of care             Manuals    PROM left shoulder JF JF 10 min ML AS 10 min SC   PROM left elbow with stretch into endranges D/c xxxxxxxxxxxxx ML AS    ST jt mob xxx xxx ML np    sidely scap retraction/ medial rotation MRE xxx xxx 2 x 10 2x10    Neuro Re-Ed         Active scap retraction/  elevation D/C xxx MRE 2/10 OTB MTP c scap ret 2x10    Supine circles  Abduction/ flexion  15 x  each Circles/abd  20x Standing Flex/Abd 2 x 10 ea Supine silvio stretch flex/ER 10"x10 ea    Prone- ext/ abduction 2/10 2/10 2 x 10 ea 2 x 10 ea    Supine static stabilization 30x 2 30x 2                              Ther Ex        pendulums 2x10  2 x 10 ea D/C     Sidely ER 2/10 1# 2/10 2 x 10 1# 2 x 10    t-band walkouts IR/ER Blue 20x/ Orange 20x Blue 20x/ Orange 20x   Blue 20x/Gplcse95q   t-band rows, CRISTAL, tricep ext red 2x20x Blue 2 x20   Blue 2/20x   Sidelying ER MRE 2x10  2/10 D/C  20x   pulleys 3 min 3 min 3 mins 3 mins 3 min   UBE 5min  2 5/2 5 5min 5x AROM 5x ea scap and flex, ecc lowering 5 min   Sub max iso - IR/ER/Ext HEP xxx 50% 2 x 10  Man resist 50% 2 x 10  Man resist    Wand chest press/flex 3 lbs x40  3# 20x 3# press/ flex  30 x / 30 x Ther Activity                        Gait Training                        Modalities        CP 10 min  10 min 10 min 10 min    IFC PRN

## 2022-07-25 ENCOUNTER — OFFICE VISIT (OUTPATIENT)
Dept: PHYSICAL THERAPY | Facility: CLINIC | Age: 67
End: 2022-07-25
Payer: MEDICARE

## 2022-07-25 DIAGNOSIS — M75.102 TEAR OF LEFT ROTATOR CUFF, UNSPECIFIED TEAR EXTENT, UNSPECIFIED WHETHER TRAUMATIC: ICD-10-CM

## 2022-07-25 DIAGNOSIS — Z48.89 AFTERCARE FOLLOWING SURGERY: Primary | ICD-10-CM

## 2022-07-25 PROCEDURE — 97112 NEUROMUSCULAR REEDUCATION: CPT

## 2022-07-25 PROCEDURE — 97110 THERAPEUTIC EXERCISES: CPT

## 2022-07-25 NOTE — PROGRESS NOTES
Daily Note     Today's date: 2022  Patient name: Hilary Durán  : 1955  MRN: [id number]  Referring provider: Daniel Mattson DO  Dx:   Encounter Diagnosis     ICD-10-CM    1  Aftercare following surgery  Z48 89    2  Tear of left rotator cuff, unspecified tear extent, unspecified whether traumatic  M75 102        Start Time: 1300  Stop Time: 1353  Total time in clinic (min): 53 minutes    Subjective: Patient states, "I did a lot over the weekend so it's sore  Maybe 0 10"  Objective: See treatment diary below    Assessment: Tolerated treatment well  Patient would benefit from continued PT  Patient did well t/o therapy and is progressing well with the current PT POC  Plan: Continue per plan of care        Manuals    PROM left shoulder JF JF 10 min JM 10 min  10 min SC   PROM left elbow with stretch into endranges D/c xxxxxxxxxxxxx      ST jt mob xxx xxx      sidely scap retraction/ medial rotation MRE xxx xxx      Neuro Re-Ed         Active scap retraction/  elevation D/C xxx      Supine circles  Abduction/ flexion  15 x  each Circles/abd  20x 2# 20x     Prone- ext/ abduction 2/10 2/10 2/10     Supine static stabilization 30x 2 30x 2 30x2                             Ther Ex        pendulums 2x10  2 x 10 ea 2x10 ea     Sidely ER 2/10 1# 2/10 1# 2/10     t-band walkouts IR/ER Blue 20x/ Orange 20x Blue 20x/ Orange 20x Blue 20x Orange 20x  Blue 20x/Xwyekh11f   t-band rows, CRISTAL, tricep ext red 2x20x Blue 2 x20 Blue 2x20  Purp row 2x20  Blue 2/20x   Sidelying ER MRE 2x10  2/10 2/10  20x   pulleys 3 min 3 min 3 min  3 min   UBE 5min  2 5/2 5 5min 2 5/2 5  5 min   Sub max iso - IR/ER/Ext HEP xxx      Wand chest press/flex 3 lbs x40  3# 20x 3# press/ flex  30 x / 30 x 3# press/ flex  30 x / 30 x             Ther Activity                        Gait Training                        Modalities        CP 10 min  10 min 10 min     IFC PRN

## 2022-07-27 ENCOUNTER — APPOINTMENT (OUTPATIENT)
Dept: PHYSICAL THERAPY | Facility: CLINIC | Age: 67
End: 2022-07-27
Payer: MEDICARE

## 2022-07-28 ENCOUNTER — OFFICE VISIT (OUTPATIENT)
Dept: PHYSICAL THERAPY | Facility: CLINIC | Age: 67
End: 2022-07-28
Payer: MEDICARE

## 2022-07-28 DIAGNOSIS — M75.102 TEAR OF LEFT ROTATOR CUFF, UNSPECIFIED TEAR EXTENT, UNSPECIFIED WHETHER TRAUMATIC: Primary | ICD-10-CM

## 2022-07-28 DIAGNOSIS — Z48.89 AFTERCARE FOLLOWING SURGERY: ICD-10-CM

## 2022-07-28 PROCEDURE — 97110 THERAPEUTIC EXERCISES: CPT

## 2022-07-28 PROCEDURE — 97112 NEUROMUSCULAR REEDUCATION: CPT

## 2022-07-28 PROCEDURE — 97140 MANUAL THERAPY 1/> REGIONS: CPT

## 2022-07-28 NOTE — PROGRESS NOTES
Daily Note     Today's date: 2022  Patient name: Alina Monzon  : 1955  MRN: [id number]  Referring provider: Salo Marquez DO  Dx:   Encounter Diagnosis     ICD-10-CM    1  Tear of left rotator cuff, unspecified tear extent, unspecified whether traumatic  M75 102    2  Aftercare following surgery  Z48 89        Start Time: 1300  Stop Time: 1400  Total time in clinic (min): 60 minutes    Subjective: I am doing ok  I have some soreness in my shoulder and elbow  Objective: See treatment diary below      Assessment: Tolerated treatment well  Patient would benefit from continued PT  Pt reports having some left elbow and shoulder soreness with the T bands today  He had good passive motion today to his left shoulder  He had some mild pain at end range of flexion and ER  Pt does reports feeling improvement with his shoulder  Plan: Continue per plan of care          Manuals    PROM left shoulder JF JF 10 min JM 10 min 10 min JF 10 min SC   PROM left elbow with stretch into endranges D/c xxxxxxxxxxxxx      ST jt mob xxx xxx      sidely scap retraction/ medial rotation MRE xxx xxx      Neuro Re-Ed         Active scap retraction/  elevation D/C xxx      Supine circles  Abduction/ flexion  15 x  each Circles/abd  20x 1 # 20x 2 # 20 x each    Prone- ext/ abduction 2/10 2/10 2/10 20 x each    Supine static stabilization 30x 2 30x 2 30x2 30 ' 2 x                            Ther Ex        pendulums 2x10  2 x 10 ea 2x10 ea 20x     Sidely ER 2/10 1# 2/10 1# 2/10 2 #  20 x    t-band walkouts IR/ER Blue 20x/ Orange 20x Blue 20x/ Orange 20x Blue 20x Orange 20x Blue 15x  Blue 15x  Blue 20x/Ostokx08d   t-band rows, CRISTAL, tricep ext red 2x20x Blue 2 x20 Blue 2x20  Purp row 2x20 Blue 2 x 20   Blue 2 x 20  Blue 2/20x   Sidelying ER MRE 2x10  2/10 2/10  20x   pulleys 3 min 3 min 3 min 3 min 3 min   UBE 5min  2 5/2 5 5min 2 5/2 5 2/5 / 2 5   5 min   Sub max iso - IR/ER/Ext HEP xxx Wand chest press/flex 3 lbs x40  3# 20x 3# press/ flex  30 x / 30 x 3# press/ flex  30 x / 30 x 3 # wand press  Flex 30 / 30 x            Ther Activity                        Gait Training                        Modalities        CP 10 min  10 min 10 min     IFC PRN

## 2022-08-01 ENCOUNTER — OFFICE VISIT (OUTPATIENT)
Dept: PHYSICAL THERAPY | Facility: CLINIC | Age: 67
End: 2022-08-01
Payer: MEDICARE

## 2022-08-01 DIAGNOSIS — Z48.89 AFTERCARE FOLLOWING SURGERY: ICD-10-CM

## 2022-08-01 DIAGNOSIS — M75.102 TEAR OF LEFT ROTATOR CUFF, UNSPECIFIED TEAR EXTENT, UNSPECIFIED WHETHER TRAUMATIC: Primary | ICD-10-CM

## 2022-08-01 PROCEDURE — 97110 THERAPEUTIC EXERCISES: CPT | Performed by: PHYSICAL THERAPIST

## 2022-08-01 PROCEDURE — 97112 NEUROMUSCULAR REEDUCATION: CPT | Performed by: PHYSICAL THERAPIST

## 2022-08-01 NOTE — PROGRESS NOTES
Daily Note     Today's date: 2022  Patient name: Destiney Vu  : 1955  MRN: [id number]  Referring provider: Ventura Jimenez DO  Dx:   Encounter Diagnosis     ICD-10-CM    1  Tear of left rotator cuff, unspecified tear extent, unspecified whether traumatic  M75 102    2  Aftercare following surgery  Z48 89        Start Time: 1300  Stop Time: 1400  Total time in clinic (min): 60 minutes    Subjective: Patient reports that his shoulder is doing well today  He continues to have some stiffness in it but he is noticing improved motion and strength  Objective: See treatment diary below      Assessment: Tolerated treatment well  Patient demonstrated fatigue post treatment, exhibited good technique with therapeutic exercises and would benefit from continued PT Patient continues to show good progress with strengthening progression  He notes most difficulty with ER strengthening with noted fatigue during standing and sidelying exercises  His passive rom is wnl and active mobility is improving nicely  Plan: Continue per plan of care  Progress treatment as tolerated  Manuals    PROM left shoulder 5 min SC JF 10 min JM 10 min 10 min JF   PROM left elbow with stretch into endranges  xxxxxxxxxxxxx     ST jt mob  xxx     sidely scap retraction/ medial rotation MRE  xxx     Neuro Re-Ed        Active scap retraction/  elevation  xxx     Supine circles  Abduction/ flexion  3# 20x ea  Circles/abd  20x 1 # 20x 2 # 20 x each   Prone- ext/ abduction 15x 2/10 2/10 20 x each   Supine static stabilization 30" 2x 30x 2 30x2 30 ' 2 x                        Ther Ex       pendulums  2 x 10 ea 2x10 ea 20x    Sidely ER  2/10 1# 2/10 2 #  20 x   t-band walkouts IR/ER Blue 20x ea    Blue 20x/ Orange 20x Blue 20x Orange 20x Blue 15x  Blue 15x    t-band rows, CRISTAL, tricep ext Maroon 40x Blue 2 x20 Blue 2x20  Purp row 2x20 Blue 2 x 20   Blue 2 x 20    Sidelying ER MRE 20x 2/10 2/10    pulleys 3 min 3 min 3 min 3 min   UBE L6 5 min 5min 2 5/2 5 2/5 / 2 5     Sub max iso - IR/ER/Ext  xxx     Wand chest press/flex 3# 30x ea    3# press/ flex  30 x / 30 x 3# press/ flex  30 x / 30 x 3 # wand press  Flex 30 / 30 x          Ther Activity                     Gait Training                     Modalities       CP 10 min 10 min 10 min    IFC PRN

## 2022-08-03 ENCOUNTER — OFFICE VISIT (OUTPATIENT)
Dept: PHYSICAL THERAPY | Facility: CLINIC | Age: 67
End: 2022-08-03
Payer: MEDICARE

## 2022-08-03 DIAGNOSIS — Z48.89 AFTERCARE FOLLOWING SURGERY: Primary | ICD-10-CM

## 2022-08-03 DIAGNOSIS — M75.102 TEAR OF LEFT ROTATOR CUFF, UNSPECIFIED TEAR EXTENT, UNSPECIFIED WHETHER TRAUMATIC: ICD-10-CM

## 2022-08-03 PROCEDURE — 97112 NEUROMUSCULAR REEDUCATION: CPT

## 2022-08-03 PROCEDURE — 97140 MANUAL THERAPY 1/> REGIONS: CPT

## 2022-08-03 PROCEDURE — 97110 THERAPEUTIC EXERCISES: CPT

## 2022-08-03 NOTE — PROGRESS NOTES
Daily Note     Today's date: 8/3/2022  Patient name: Radha Brown  : 1955  MRN: [id number]  Referring provider: Mauri Rey DO  Dx:   Encounter Diagnosis     ICD-10-CM    1  Aftercare following surgery  Z48 89    2  Tear of left rotator cuff, unspecified tear extent, unspecified whether traumatic  M75 102        Start Time: 1400  Stop Time: 1455  Total time in clinic (min): 55 minutes    Subjective: I am feeling ok today  I dont have much pain  Objective: See treatment diary below      Assessment: Tolerated treatment well  Patient would benefit from continued PT Next visit D/C T band row, ext and tricep ext and begin CC row, ext and tricep ext  Wand flexion was done in standing today with 3#  Plan: Continue per plan of care  Manuals 8/1 8/3 7/25 7/28   PROM left shoulder 5 min SC JF 10 min JM 10 min 10 min JF   PROM left elbow with stretch into endranges  xxxxxxxxxxxxx     ST jt mob  xxx     sidely scap retraction/ medial rotation MRE  xxx     Neuro Re-Ed        Active scap retraction/  elevation  xxx     Supine circles  Abduction/ flexion  3# 20x ea  Circles/abd 2#  20x 1 # 20x 2 # 20 x each   Prone- ext/ abduction 15x 2/10  1 #  2/10 20 x each   Supine static stabilization 30" 2x 30x 2 30x2 30 ' 2 x                        Ther Ex       pendulums  2 x 10 ea 2x10 ea 20x    Sidely ER  2/10  1#  1# 2/10 2 #  20 x   t-band walkouts IR/ER Blue 20x ea  Blue 20x/  green 20x Blue 20x Orange 20x Blue 15x  Blue 15x    t-band rows, CRISTAL, tricep ext Maroon 40x Purple  2 x20 Blue 2x20  Purp row 2x20 Blue 2 x 20   Blue 2 x 20    Sidelying ER MRE 20x 2/10 2/10    pulleys 3 min 3 min 3 min 3 min   UBE L6 5 min 5min 2 5/2 5 2/5 / 2 5     Sub max iso - IR/ER/Ext  xxx     Wand chest press/flex 3# 30x ea    3# press/ flex  30 x / 30 x 3# press/ flex  30 x / 30 x 3 # wand press  Flex 30 / 30 x   CC row  NV     CC S ext  NV     CC tri Ext  NV            Ther Activity                     Gait Training                     Modalities       CP 10 min 10 min 10 min    IFC PRN

## 2022-08-04 ENCOUNTER — OFFICE VISIT (OUTPATIENT)
Dept: OBGYN CLINIC | Facility: CLINIC | Age: 67
End: 2022-08-04

## 2022-08-04 VITALS
HEIGHT: 66 IN | DIASTOLIC BLOOD PRESSURE: 90 MMHG | SYSTOLIC BLOOD PRESSURE: 166 MMHG | WEIGHT: 210 LBS | BODY MASS INDEX: 33.75 KG/M2 | HEART RATE: 77 BPM

## 2022-08-04 DIAGNOSIS — Z98.890 S/P ARTHROSCOPY OF LEFT SHOULDER: Primary | ICD-10-CM

## 2022-08-04 DIAGNOSIS — Z98.890 STATUS POST LEFT ROTATOR CUFF REPAIR: ICD-10-CM

## 2022-08-04 PROCEDURE — 99024 POSTOP FOLLOW-UP VISIT: CPT | Performed by: ORTHOPAEDIC SURGERY

## 2022-08-04 NOTE — PROGRESS NOTES
Assessment/Plan:    No problem-specific Assessment & Plan notes found for this encounter  Diagnoses and all orders for this visit:    S/P arthroscopy of left shoulder    Status post left rotator cuff repair          The patient is doing quite well from his left shoulder rotator cuff repair  Continue therapy to a [facility]  Continue home exercise program   Return back in 3 months for evaluation for final strength and motion check    Subjective:      Patient ID: Joshua Worthington is a 69 y o  male  HPI    The patient is nearly 3 months status post left shoulder rotator cuff repair  He offers no complaints of pain  He is pleased with his results  He states therapy starting to wind down  He denies numbness or tingling  The following portions of the patient's history were reviewed and updated as appropriate: allergies, current medications, past family history, past medical history, past social history, past surgical history and problem list     Review of Systems   Constitutional: Negative for chills, fever and unexpected weight change  HENT: Negative for hearing loss, nosebleeds and sore throat  Eyes: Negative for pain, redness and visual disturbance  Respiratory: Negative for cough, shortness of breath and wheezing  Cardiovascular: Negative for chest pain, palpitations and leg swelling  Gastrointestinal: Negative for abdominal pain, nausea and vomiting  Endocrine: Negative for polydipsia and polyuria  Genitourinary: Negative for dysuria and hematuria  Musculoskeletal: Negative for arthralgias, back pain, gait problem, joint swelling, myalgias, neck pain and neck stiffness  As noted in HPI   Skin: Negative for rash and wound  Neurological: Negative for dizziness, numbness and headaches  Psychiatric/Behavioral: Negative for decreased concentration and suicidal ideas  The patient is not nervous/anxious            Objective:      /90   Pulse 77   Ht 5' 6" (1 676 m)   Wt 95 3 kg (210 lb)   BMI 33 89 kg/m²          Physical Exam      Neck was soft and supple  There is a negative axial compression test in her neck  Left upper extremity is neurovascular intact  Fingers are pink and mobile  Compartments are soft  Range of motion of his left shoulder is 175° of forward flexion, 175° of abduction, and internal rotation to L4  There is no signs of impingement  No signs of instability  No atrophy is present  Rotator cuff strength testing grossly intact

## 2022-08-08 ENCOUNTER — OFFICE VISIT (OUTPATIENT)
Dept: PHYSICAL THERAPY | Facility: CLINIC | Age: 67
End: 2022-08-08
Payer: MEDICARE

## 2022-08-08 DIAGNOSIS — M75.102 TEAR OF LEFT ROTATOR CUFF, UNSPECIFIED TEAR EXTENT, UNSPECIFIED WHETHER TRAUMATIC: ICD-10-CM

## 2022-08-08 DIAGNOSIS — Z48.89 AFTERCARE FOLLOWING SURGERY: Primary | ICD-10-CM

## 2022-08-08 PROCEDURE — 97112 NEUROMUSCULAR REEDUCATION: CPT

## 2022-08-08 PROCEDURE — 97140 MANUAL THERAPY 1/> REGIONS: CPT

## 2022-08-08 PROCEDURE — 97110 THERAPEUTIC EXERCISES: CPT

## 2022-08-08 NOTE — PROGRESS NOTES
Daily Note     Today's date: 2022  Patient name: John Boothe  : 1955  MRN: [id number]  Referring provider: Savannah Montemayor DO  Dx:   Encounter Diagnosis     ICD-10-CM    1  Aftercare following surgery  Z48 89    2  Tear of left rotator cuff, unspecified tear extent, unspecified whether traumatic  M75 102                   Subjective: Patient notes mils soreness in the shoulder this date due to completing housework prior to coming in for treatment  Objective: See treatment diary below      Assessment: Tolerated treatment well  Patient would benefit from continued PTto improve strength for [employer] reaching motions and ER  Patient does fatigue with ER quickly  He is able to complete light PRE with cues for improved performance  Plan: Continue per plan of care  Manuals 8/1 8/3 8/8 7/28   PROM left shoulder 5 min SC JF 10 min CD 10 min 10 min JF   PROM left elbow with stretch into endranges  xxxxxxxxxxxxx     ST jt mob  xxx     sidely scap retraction/ medial rotation MRE  xxx     Neuro Re-Ed        Active scap retraction/  elevation  xxx     Supine circles  Abduction/ flexion  3# 20x ea  Circles/abd 2#  20x 2 # 20x 2 # 20 x each   Prone- ext/ abduction 15x 2/10  1 #  1# 2/10 20 x each   Supine static stabilization 30" 2x 30x 2 30x2 30 ' 2 x                        Ther Ex       pendulums  2 x 10 ea 2x10 ea 20x    Sidely ER  2/10  1#  1# 2/10 2 #  20 x   t-band walkouts IR/ER Blue 20x ea  Blue 20x/  green 20x IR CC 20#  ER green 20x Blue 15x  Blue 15x    t-band rows, CRISTAL, tricep ext Maroon 40x Purple  2 x20 CC 30lbs 2x15 Blue 2 x 20   Blue 2 x 20    Sidelying ER MRE 20x 2/10 xxx    pulleys 3 min 3 min 3 min 3 min   UBE L6 5 min 5min 2 5/2 5 2/5 / 2 5     Sub max iso - IR/ER/Ext  xxx     Wand chest press/flex 3# 30x ea    3# press/ flex  30 x / 30 x  3 # wand press  Flex 30 / 30 x   CC row  NV     PRE- forward reach  NV 2 lbs 2x10    PRE- PNF- D1/D2  NV 2 lbs 2x10    Supine PRE- chest press   2 lbs 2x10    Ther Activity                     Gait Training                     Modalities       CP 10 min 10 min 10 min    IFC PRN

## 2022-08-11 ENCOUNTER — APPOINTMENT (OUTPATIENT)
Dept: PHYSICAL THERAPY | Facility: CLINIC | Age: 67
End: 2022-08-11
Payer: MEDICARE

## 2022-08-15 ENCOUNTER — OFFICE VISIT (OUTPATIENT)
Dept: PHYSICAL THERAPY | Facility: CLINIC | Age: 67
End: 2022-08-15
Payer: MEDICARE

## 2022-08-15 DIAGNOSIS — M75.102 TEAR OF LEFT ROTATOR CUFF, UNSPECIFIED TEAR EXTENT, UNSPECIFIED WHETHER TRAUMATIC: ICD-10-CM

## 2022-08-15 DIAGNOSIS — Z48.89 AFTERCARE FOLLOWING SURGERY: Primary | ICD-10-CM

## 2022-08-15 PROCEDURE — 97110 THERAPEUTIC EXERCISES: CPT

## 2022-08-15 PROCEDURE — 97112 NEUROMUSCULAR REEDUCATION: CPT

## 2022-08-15 PROCEDURE — 97140 MANUAL THERAPY 1/> REGIONS: CPT

## 2022-08-15 NOTE — PROGRESS NOTES
Daily Note     Today's date: 8/15/2022  Patient name: Pawel Reis  : 1955  MRN: [id number]  Referring provider: Abdirizak Mcgregor DO  Dx:   Encounter Diagnosis     ICD-10-CM    1  Aftercare following surgery  Z48 89    2  Tear of left rotator cuff, unspecified tear extent, unspecified whether traumatic  M75 102        Start Time: 1300  Stop Time: 1400  Total time in clinic (min): 60 minutes    Subjective:   Pt returns from MD F/U stating the MD is pleased with his progress and is not seeing him again until Nov  He plans to continue PT until the end of the month and discussed same with the PT  Objective: See treatment diary below  PTA added chest press at pt's request; slight elbow stiffness after same  Consider adding pec dec and rear delt next visit instead  Assessment: Tolerated treatment well  Patient exhibited good technique with therapeutic exercises      Plan: Continue per plan of care  Manuals 8/1 8/3 8/8 8/15    PROM left shoulder 5 min SC JF 10 min CD 10 min ds    ST jt mob  xxx  xx    sidely scap retraction/ medial rotation MRE  xxx  xx    Neuro Re-Ed         Supine circles  Abduction/ flexion  3# 20x ea  Circles/abd 2#  20x 2 # 20x 2# 20x ea    Prone- ext/ abduction 15x 2/10  1 #  1# 2/10 20x    Supine static stabilization 30" 2x 30x 2 30x2 2x 30"    Ther Ex        Pendulums  2 x 10 ea 2x10 ea 20x     Sidely ER  2/10  1#  1# 2/10 2# 2/10     Walkouts IR/ER Blue 20x ea  Blue 20x/  green 20x IR CC 20#  ER green 20x IR 20#   ER L3 20x     Rows, CRISTAL, tricep ext Maroon 40x Purple  2 x20 CC 30lbs 2x15 30#  2/15    Sidelying ER MRE 20x 2/10 xxx xxx    Pulleys 3 min 3 min 3 min 20x    UBE L6 5 min 5min 2 5/2 5 5m L6    Wand chest press/flex 3# 30x ea    3# press/ flex  30 x / 30 x  10# 2/15 press  3# 02/15 flex    PRE- forward reach  NV 2 lbs 2x10 2# 2/10    PRE- PNF- D1/D2  NV 2 lbs 2x10 2# 2/10    Supine PRE- chest press   2 lbs 2x10 Chest press 15# 2/10    Ther Activity Gait Training        Modalities        CP 10 min 10 min 10 min 10m    IFC PRN

## 2022-08-18 ENCOUNTER — OFFICE VISIT (OUTPATIENT)
Dept: PHYSICAL THERAPY | Facility: CLINIC | Age: 67
End: 2022-08-18
Payer: MEDICARE

## 2022-08-18 DIAGNOSIS — M75.102 TEAR OF LEFT ROTATOR CUFF, UNSPECIFIED TEAR EXTENT, UNSPECIFIED WHETHER TRAUMATIC: Primary | ICD-10-CM

## 2022-08-18 DIAGNOSIS — Z48.89 AFTERCARE FOLLOWING SURGERY: ICD-10-CM

## 2022-08-18 PROCEDURE — 97112 NEUROMUSCULAR REEDUCATION: CPT

## 2022-08-18 PROCEDURE — 97110 THERAPEUTIC EXERCISES: CPT

## 2022-08-18 PROCEDURE — 97140 MANUAL THERAPY 1/> REGIONS: CPT

## 2022-08-18 NOTE — PROGRESS NOTES
Daily Note     Today's date: 2022  Patient name: Live Rios  : 1955  MRN: [id number]  Referring provider: Leonides Rodríguez DO  Dx:   Encounter Diagnosis     ICD-10-CM    1  Tear of left rotator cuff, unspecified tear extent, unspecified whether traumatic  M75 102    2  Aftercare following surgery  Z48 89        Start Time: 1255  Stop Time: 1400  Total time in clinic (min): 65 minutes    Subjective: My left shoulder is feeling pretty good  I am using it at home and its doing fine  I have pain in my wrist today  Objective: See treatment diary below      Assessment: Tolerated treatment well  Patient would benefit from continued PT  Pt did well with his exercises today with little problem in his shoulder  He did report having wrist pain   Pt had some tenderness and pain over his lateral left deltoid   It felt better after STM to that area  He feels that he is getting stronger and is having less pain  Plan: Continue per plan of care  Manuals 8/1 8/3 8/8 8/15 8/18   PROM left shoulder 5 min SC JF 10 min CD 10 min ds JF 10 min   ST jt mob  xxx  xx    sidely scap retraction/ medial rotation MRE  xxx  xx    Neuro Re-Ed         Supine circles  Abduction/ flexion  3# 20x ea  Circles/abd 2#  20x 2 # 20x 2# 20x ea 2#  20 x   Prone- ext/ abduction 15x 2/10  1 #  1# 2/10 20x 20x    Supine static stabilization 30" 2x 30x 2 30x2 2x 30" 2 x 30"    Ther Ex        Pendulums  2 x 10 ea 2x10 ea 20x  20x   Sidely ER  2/10  1#  1# 2/10 2# 2/10 2 # 2/10     Walkouts IR/ER Blue 20x ea  Blue 20x/  green 20x IR CC 20#  ER green 20x IR 20#   ER L3 20x Green  ER 20x  IR 20 #    Rows, CRISTAL, tricep ext Maroon 40x Purple  2 x20 CC 30lbs 2x15 30#  2/15 20 # 2 /15   Left only   Sidelying ER MRE 20x 2/10 xxx xxx    Pulleys 3 min 3 min 3 min 20x 20x    UBE L6 5 min 5min 2 5/2 5 5m L6  5 min L6   Wand chest press/flex 3# 30x ea    3# press/ flex  30 x / 30 x  10# 2/15 press  3# 02/15 flex    PRE- forward reach NV 2 lbs 2x10 2# 2/10 2 # 20x    PRE- PNF- D1/D2  NV 2 lbs 2x10 2# 2/10 2 # 20 x    Supine PRE- chest press   2 lbs 2x10 Chest press 15# 2/10    Ther Activity        Gait Training        Modalities        CP 10 min 10 min 10 min 10m    IFC PRN

## 2022-08-22 ENCOUNTER — EVALUATION (OUTPATIENT)
Dept: PHYSICAL THERAPY | Facility: CLINIC | Age: 67
End: 2022-08-22
Payer: MEDICARE

## 2022-08-22 DIAGNOSIS — M75.102 TEAR OF LEFT ROTATOR CUFF, UNSPECIFIED TEAR EXTENT, UNSPECIFIED WHETHER TRAUMATIC: Primary | ICD-10-CM

## 2022-08-22 DIAGNOSIS — Z48.89 AFTERCARE FOLLOWING SURGERY: ICD-10-CM

## 2022-08-22 PROCEDURE — 97110 THERAPEUTIC EXERCISES: CPT

## 2022-08-22 PROCEDURE — 97112 NEUROMUSCULAR REEDUCATION: CPT

## 2022-08-22 PROCEDURE — 97140 MANUAL THERAPY 1/> REGIONS: CPT

## 2022-08-22 NOTE — LETTER
2022    Kirill Hyman DO  [address] [place] [address]    Patient: Nupur Maya   YOB: 1955   Date of Visit: 2022     Encounter Diagnosis     ICD-10-CM    1  Tear of left rotator cuff, unspecified tear extent, unspecified whether traumatic  M75 102    2  Aftercare following surgery  Z48 89        Dear Dr Vigil Most:    Thank you for your recent referral of Nupur Maya  Please review the attached evaluation summary from Gene's recent visit  Please verify that you agree with the plan of care by signing the attached order  If you have any questions or concerns, please do not hesitate to call  I sincerely appreciate the opportunity to share in the care of one of your patients and hope to have another opportunity to work with you in the near future  Sincerely,    Viridiana Valdovinos, PT      Referring Provider:      I certify that I have read the below Plan of Care and certify the need for these services furnished under this plan of treatment while under my care  Kirill Hyman DO  [address] [facility]   [address] [place]          PT Re-Evaluation     Today's date: 2022  Patient name: Nupur Maya  : 1955  MRN: [id number]  Referring provider: Kenya Raza DO  Dx:   Encounter Diagnosis     ICD-10-CM    1  Tear of left rotator cuff, unspecified tear extent, unspecified whether traumatic  M75 102    2  Aftercare following surgery  Z48 89                   Assessment  Assessment details: Patient is a 74 y o  male who has been seen at PT for 17 visits since [facility] following left RC repair on 22  Patient is progressing nicely toward all goals with increased strength and AROM  He has minimal tenderness that persists at the shoulder  He is able to complete OH motion but notes discomfort completing this motion   Patient is compliant with his HEP and continues to progress toward all goals  8/22/22  Patient is progressing toward all goals  He continues with mild strength deficits into abduction and ER  He has full AROM/ PROM of the shoulder in all motions with no pain complaints at endranges  Will continue PT x2 weeks then D/C to HEP  Impairments: abnormal or restricted ROM and pain with function    Goals  STG - 4 weeks - all STG met  Patient to report decreased pain to 2/10 with function of the left shoulder for dressing/hygene tasks  Patient able to complete self dressing tasks of the upper body without assistance  Patient able to complete hygiene tasks without assistance  Patient to have increased left shoulder PROM to 90degrees into flexion  LTG - 12 weeks - progression toward all goals is noted  Patient able to demonstrate strength of the left shoulder into flexion/ abduction to 4/5  Patient to demonstrate increased strength of the left shoulder into ER to 4/5  Patient able to demonstrate left shoulder AROM to WNL for completion of functional tasks  Patient to note pain at worst with activity to be 1/10 SPR  Patient to demonstrate posture in sitting and standing WNL  Patient to be d/c to final/ revised HEP  Plan  Plan details: Patient's evaluation is completed  Patient was instructed with a HEP this date  Patient has scheduled further PT sessions for treatment  Patient would benefit from: skilled physical therapy  Planned modality interventions: ultrasound, unattended electrical stimulation and cryotherapy  Planned therapy interventions: manual therapy, neuromuscular re-education, therapeutic exercise, therapeutic training and home exercise program  Frequency: 2x week  Duration in weeks: 2  Plan of Care beginning date: 8/22/2022  Plan of Care expiration date: 9/2/2022        Subjective Evaluation    History of Present Illness  Mechanism of injury: Patient notes that he tore the [facility] in February 2022 when he lifted a heavy bucket   He underwent surgery on 22 for a large cuff repair  He notes that prior to surgery he was limited in tolerance to active shoulder movement  He notes that he did have a hx of small RC tears in the left shoulder which improved with PT intervention  He notes compliance with his post-op sling  UPDATE 22  Patient notes that "everything" is challenging because he is not to actively elevate his shoulder  Patient notes that he is able to wash dishes and fold socks  UPDATE 22  Patient notes no new issues this date  He feels his shoulder movement is improving  He notes OH reaching does remain somewhat difficult  UPDATE 22  Patient notes that he has a flare in OA pain in the right hand/wrist but the left shoulder is feeling good  He feels the left shoulder is doing well and he will be ready for d/c to HEP at the end of next week     Pain  Current pain ratin  At worst pain ratin  Quality: dull ache    Patient Goals  Patient goals for therapy: decreased pain, increased motion, increased strength and independence with ADLs/IADLs          Objective     Cervical/Thoracic Screen   Cervical range of motion within normal limits    Active Range of Motion   Left Shoulder   Flexion: 164 degrees   Abduction: 148 degrees   External rotation 45°: WFL  Internal rotation 0°: WFL    Right Shoulder   Flexion: 160 degrees     Passive Range of Motion   Left Shoulder   Flexion: 168 degrees   Abduction: 154 degrees with pain  External rotation 45°: 45 degrees with pain  Internal rotation 45°: WFL    Strength/Myotome Testing     Left Shoulder     Planes of Motion   Flexion: 4   Extension: 4+   Abduction: 4   Adduction: 4   External rotation at 0°: 4   Internal rotation at 0°: 4+     Right Shoulder   Normal muscle strength                  Manuals  8/3 8/8 8/15 8/18   PROM left shoulder 10 min CD JF 10 min CD 10 min ds JF 10 min   ST jt mob  xxx  xx    sidely scap retraction/ medial rotation MRE  xxx  xx    Neuro Re-Ed Supine circles  Abduction/ flexion  3# 20x ea  Circles/abd 2#  20x 2 # 20x 2# 20x ea 2#  20 x   Prone- ext/ abduction 15x 2/10  1 #  1# 2/10 20x 20x    Supine static stabilization 30" 2x 30x 2 30x2 2x 30" 2 x 30"    Ther Ex        Pendulums xxx 2 x 10 ea 2x10 ea 20x  20x   Sidely ER 2#  2/10  1#  1# 2/10 2# 2/10 2 # 2/10     Walkouts IR/ER ER green 20x  IR 20# Blue 20x/  green 20x IR CC 20#  ER green 20x IR 20#   ER L3 20x Green  ER 20x  IR 20 #    Rows, CRISTAL, tricep ext Maroon 40x Purple  2 x20 CC 30lbs 2x15 30#  2/15 20 # 2 /15   Left only   Sidelying ER MRE 20x 2/10 xxx xxx    Pulleys 3 min 3 min 3 min 20x 20x    UBE L6 5 min 5min 2 5/2 5 5m L6  5 min L6   Wand chest press/flex  3# press/ flex  30 x / 30 x  10# 2/15 press  3# 02/15 flex    PRE- forward reach 2# 20x NV 2 lbs 2x10 2# 2/10 2 # 20x    PRE- PNF- D1/D2 2# 20x NV 2 lbs 2x10 2# 2/10 2 # 20 x    Supine PRE- chest press   2 lbs 2x10 Chest press 15# 2/10    Ther Activity        Gait Training        Modalities        CP 10 min 10 min 10 min 10m    IFC PRN                   Access Code: [email or web address]  URL: [email or web address]  Date: 05/18/2022  Prepared by: Pamela Row    Exercises  · Seated Scapular Retraction - 1 x daily - 7 x weekly - 3 sets - 10 reps  · Seated Shoulder Shrugs - 1 x daily - 7 x weekly - 3 sets - 10 reps  · Wrist Flexion AROM - 1 x daily - 7 x weekly - 3 sets - 10 reps  · Wrist AROM Radial Ulnar Deviation - 1 x daily - 7 x weekly - 3 sets - 10 reps  · Wrist Flexion Extension AROM with Fingers Curled and Palm Down - 1 x daily - 7 x weekly - 3 sets - 10 reps  · Circular Shoulder Pendulum with Table Support - 1 x daily - 7 x weekly - 3 sets - 10 reps

## 2022-08-22 NOTE — PROGRESS NOTES
PT Re-Evaluation     Today's date: 2022  Patient name: Hilary Durán  : 1955  MRN: [id number]  Referring provider: Daniel Mattson DO  Dx:   Encounter Diagnosis     ICD-10-CM    1  Tear of left rotator cuff, unspecified tear extent, unspecified whether traumatic  M75 102    2  Aftercare following surgery  Z48 89                   Assessment  Assessment details: Patient is a 74 y o  male who has been seen at PT for 17 visits since [facility] following left RC repair on 22  Patient is progressing nicely toward all goals with increased strength and AROM  He has minimal tenderness that persists at the shoulder  He is able to complete OH motion but notes discomfort completing this motion  Patient is compliant with his HEP and continues to progress toward all goals  22  Patient is progressing toward all goals  He continues with mild strength deficits into abduction and ER  He has full AROM/ PROM of the shoulder in all motions with no pain complaints at endranges  Will continue PT x2 weeks then D/C to HEP  Impairments: abnormal or restricted ROM and pain with function    Goals  STG - 4 weeks - all STG met  Patient to report decreased pain to 2/10 with function of the left shoulder for dressing/hygene tasks  Patient able to complete self dressing tasks of the upper body without assistance  Patient able to complete hygiene tasks without assistance  Patient to have increased left shoulder PROM to 90degrees into flexion  LTG - 12 weeks - progression toward all goals is noted  Patient able to demonstrate strength of the left shoulder into flexion/ abduction to 4/5  Patient to demonstrate increased strength of the left shoulder into ER to 4/5  Patient able to demonstrate left shoulder AROM to WNL for completion of functional tasks  Patient to note pain at worst with activity to be 1/10 SPR  Patient to demonstrate posture in sitting and standing WNL    Patient to be d/c to final/ revised HEP     Plan  Plan details: Patient's evaluation is completed  Patient was instructed with a HEP this date  Patient has scheduled further PT sessions for treatment  Patient would benefit from: skilled physical therapy  Planned modality interventions: ultrasound, unattended electrical stimulation and cryotherapy  Planned therapy interventions: manual therapy, neuromuscular re-education, therapeutic exercise, therapeutic training and home exercise program  Frequency: 2x week  Duration in weeks: 2  Plan of Care beginning date: 2022  Plan of Care expiration date: 2022        Subjective Evaluation    History of Present Illness  Mechanism of injury: Patient notes that he tore the [facility] in 2022 when he lifted a heavy bucket  He underwent surgery on 22 for a large cuff repair  He notes that prior to surgery he was limited in tolerance to active shoulder movement  He notes that he did have a hx of small RC tears in the left shoulder which improved with PT intervention  He notes compliance with his post-op sling  UPDATE 22  Patient notes that "everything" is challenging because he is not to actively elevate his shoulder  Patient notes that he is able to wash dishes and fold socks  UPDATE 22  Patient notes no new issues this date  He feels his shoulder movement is improving  He notes OH reaching does remain somewhat difficult  UPDATE 22  Patient notes that he has a flare in OA pain in the right hand/wrist but the left shoulder is feeling good  He feels the left shoulder is doing well and he will be ready for d/c to HEP at the end of next week     Pain  Current pain ratin  At worst pain ratin  Quality: dull ache    Patient Goals  Patient goals for therapy: decreased pain, increased motion, increased strength and independence with ADLs/IADLs          Objective     Cervical/Thoracic Screen   Cervical range of motion within normal limits    Active Range of Motion   Left Shoulder   Flexion: 164 degrees   Abduction: 148 degrees   External rotation 45°: WFL  Internal rotation 0°: WFL    Right Shoulder   Flexion: 160 degrees     Passive Range of Motion   Left Shoulder   Flexion: 168 degrees   Abduction: 154 degrees with pain  External rotation 45°: 45 degrees with pain  Internal rotation 45°: WFL    Strength/Myotome Testing     Left Shoulder     Planes of Motion   Flexion: 4   Extension: 4+   Abduction: 4   Adduction: 4   External rotation at 0°: 4   Internal rotation at 0°: 4+     Right Shoulder   Normal muscle strength                  Manuals 8/22 8/3 8/8 8/15 8/18   PROM left shoulder 10 min CD JF 10 min CD 10 min ds JF 10 min   ST jt mob  xxx  xx    sidely scap retraction/ medial rotation MRE  xxx  xx    Neuro Re-Ed         Supine circles  Abduction/ flexion  3# 20x ea  Circles/abd 2#  20x 2 # 20x 2# 20x ea 2#  20 x   Prone- ext/ abduction 15x 2/10  1 #  1# 2/10 20x 20x    Supine static stabilization 30" 2x 30x 2 30x2 2x 30" 2 x 30"    Ther Ex        Pendulums xxx 2 x 10 ea 2x10 ea 20x  20x   Sidely ER 2#  2/10  1#  1# 2/10 2# 2/10 2 # 2/10     Walkouts IR/ER ER green 20x  IR 20# Blue 20x/  green 20x IR CC 20#  ER green 20x IR 20#   ER L3 20x Green  ER 20x  IR 20 #    Rows, CRISTAL, tricep ext Maroon 40x Purple  2 x20 CC 30lbs 2x15 30#  2/15 20 # 2 /15   Left only   Sidelying ER MRE 20x 2/10 xxx xxx    Pulleys 3 min 3 min 3 min 20x 20x    UBE L6 5 min 5min 2 5/2 5 5m L6  5 min L6   Wand chest press/flex  3# press/ flex  30 x / 30 x  10# 2/15 press  3# 02/15 flex    PRE- forward reach 2# 20x NV 2 lbs 2x10 2# 2/10 2 # 20x    PRE- PNF- D1/D2 2# 20x NV 2 lbs 2x10 2# 2/10 2 # 20 x    Supine PRE- chest press   2 lbs 2x10 Chest press 15# 2/10    Ther Activity        Gait Training        Modalities        CP 10 min 10 min 10 min 10m    IFC PRN                   Access Code: [email or web address]  URL: [email or web address]  Date: 05/18/2022  Prepared by: Manfred Quintana Ghazalkelberger    Exercises  · Seated Scapular Retraction - 1 x daily - 7 x weekly - 3 sets - 10 reps  · Seated Shoulder Shrugs - 1 x daily - 7 x weekly - 3 sets - 10 reps  · Wrist Flexion AROM - 1 x daily - 7 x weekly - 3 sets - 10 reps  · Wrist AROM Radial Ulnar Deviation - 1 x daily - 7 x weekly - 3 sets - 10 reps  · Wrist Flexion Extension AROM with Fingers Curled and Palm Down - 1 x daily - 7 x weekly - 3 sets - 10 reps  · Circular Shoulder Pendulum with Table Support - 1 x daily - 7 x weekly - 3 sets - 10 reps

## 2022-08-25 ENCOUNTER — OFFICE VISIT (OUTPATIENT)
Dept: PHYSICAL THERAPY | Facility: CLINIC | Age: 67
End: 2022-08-25
Payer: MEDICARE

## 2022-08-25 DIAGNOSIS — Z48.89 AFTERCARE FOLLOWING SURGERY: ICD-10-CM

## 2022-08-25 DIAGNOSIS — M75.102 TEAR OF LEFT ROTATOR CUFF, UNSPECIFIED TEAR EXTENT, UNSPECIFIED WHETHER TRAUMATIC: Primary | ICD-10-CM

## 2022-08-25 PROCEDURE — 97140 MANUAL THERAPY 1/> REGIONS: CPT

## 2022-08-25 PROCEDURE — 97112 NEUROMUSCULAR REEDUCATION: CPT

## 2022-08-25 PROCEDURE — 97110 THERAPEUTIC EXERCISES: CPT

## 2022-08-25 NOTE — PROGRESS NOTES
Daily Note     Today's date: 2022  Patient name: Kinsey Tijerina  : 1955  MRN: [id number]  Referring provider: Charles Fu DO  Dx:   Encounter Diagnosis     ICD-10-CM    1  Tear of left rotator cuff, unspecified tear extent, unspecified whether traumatic  M75 102    2  Aftercare following surgery  Z48 89                   Subjective: Patient notes no new issues this date  He feels he will be ready for d/c next week  Objective: See treatment diary below      Assessment: Tolerated treatment well  Patient would benefit from continued PT  Able to complete all PRE without fatigue or pain  He was cued to improve posture minimally this date  Patient is able to demonstrate good muscular endurance and control  Mild pain complaints with ER resisted strengthening  Plan: Continue per plan of care  Manuals 8/22 8/25 8/8 8/15 8/18   PROM left shoulder 10 min CD CD 10 min CD 10 min ds JF 10 min   ST jt mob  xxx  xx    sidely scap retraction/ medial rotation MRE  xxx  xx    Neuro Re-Ed         Supine circles  Abduction/ flexion  3# 20x ea    Circles/abd 3#  20x 2 # 20x 2# 20x ea 2#  20 x   Prone- ext/ abduction 15x 2/10  1 #  1# 2/10 20x 20x    Supine static stabilization 30" 2x 30x 2 30x2 2x 30" 2 x 30"    Ther Ex        Pendulums xxx 2 x 10 ea 2x10 ea 20x  20x   Sidely ER 2#  2/10  1#  1# 2/10 2# 2/10 2 # 2/10     Walkouts IR/ER ER green 20x  IR 20# Blue 20x       IR 20# 20x IR CC 20#  ER green 20x IR 20#   ER L3 20x Green  ER 20x  IR 20 #    Rows, CRISTAL, tricep ext 20# 2/15 Left only 20# 2/15 Left only CC 30lbs 2x15 30#  2/15 20 # 2 /15   Left only   Sidelying ER MRE 20x 2/10 xxx xxx    Pulleys 3 min 3 min 3 min 20x 20x    UBE L6 5 min L6 5 min 2 5/2 5 5m L6  5 min L6   Wand chest press/flex  3# press/ flex  30 x / 30 x  10# 2/15 press  3# 02/15 flex    PRE- forward reach 2# 20x 2# 20x 2 lbs 2x10 2# 2/10 2 # 20x    PRE- PNF- D1/D2 2# 20x 2# 20x 2 lbs 2x10 2# 2/10 2 # 20 x    Supine PRE- chest press  4 lbs 2x10 2 lbs 2x10 Chest press 15# 2/10    Ther Activity        Gait Training        Modalities        CP 10 min 10 min 10 min 10m    IFC PRN

## 2022-08-29 ENCOUNTER — OFFICE VISIT (OUTPATIENT)
Dept: PHYSICAL THERAPY | Facility: CLINIC | Age: 67
End: 2022-08-29
Payer: MEDICARE

## 2022-08-29 DIAGNOSIS — M75.102 TEAR OF LEFT ROTATOR CUFF, UNSPECIFIED TEAR EXTENT, UNSPECIFIED WHETHER TRAUMATIC: Primary | ICD-10-CM

## 2022-08-29 DIAGNOSIS — Z48.89 AFTERCARE FOLLOWING SURGERY: ICD-10-CM

## 2022-08-29 PROCEDURE — 97110 THERAPEUTIC EXERCISES: CPT

## 2022-08-29 NOTE — PROGRESS NOTES
PT Discharge    Today's date: 2022  Patient name: Mago Colindres  : 1955  MRN: [id number]  Referring provider: Awais Abreu DO  Dx:   Encounter Diagnosis     ICD-10-CM    1  Tear of left rotator cuff, unspecified tear extent, unspecified whether traumatic  M75 102    2  Aftercare following surgery  Z48 89                   Assessment  Assessment details: Patient is appropriate for d/c to HEP at this time as he has met goals and is pain free  Patient notes compliance with his HEP  Goals  STG - 4 weeks - all STG met  Patient to report decreased pain to 2/10 with function of the left shoulder for dressing/hygene tasks  Patient able to complete self dressing tasks of the upper body without assistance  Patient able to complete hygiene tasks without assistance  Patient to have increased left shoulder PROM to 90degrees into flexion  LTG - 12 weeks - all LTG met  Patient able to demonstrate strength of the left shoulder into flexion/ abduction to 4/5  Patient to demonstrate increased strength of the left shoulder into ER to 4/5  Patient able to demonstrate left shoulder AROM to WNL for completion of functional tasks  Patient to note pain at worst with activity to be 1/10 SPR  Patient to demonstrate posture in sitting and standing WNL  Patient to be d/c to final/ revised HEP  Plan  Plan details: D/C to HEP  Patient would benefit from: skilled physical therapy  Planned modality interventions: ultrasound, unattended electrical stimulation and cryotherapy  Planned therapy interventions: manual therapy, neuromuscular re-education, therapeutic exercise, therapeutic training and home exercise program  Frequency: 2x week  Plan of Care beginning date: 2022  Plan of Care expiration date: 2022        Subjective Evaluation    History of Present Illness  Mechanism of injury: Patient notes that he tore the left RC in 2022 when he lifted a heavy bucket   He underwent surgery on 22 for a large cuff repair  He notes that prior to surgery he was limited in tolerance to active shoulder movement  He notes that he did have a hx of small RC tears in the left shoulder which improved with PT intervention  He notes compliance with his post-op sling  UPDATE 22  Patient notes that "everything" is challenging because he is not to actively elevate his shoulder  Patient notes that he is able to wash dishes and fold socks  UPDATE 22  Patient notes no new issues this date  He feels his shoulder movement is improving  He notes OH reaching does remain somewhat difficult  UPDATE 22  Patient notes that he has a flare in OA pain in the right hand/wrist but the left shoulder is feeling good  He feels the left shoulder is doing well and he will be ready for d/c to HEP at the end of next week  UPDATE 22  Patient notes that he is agreeable with d/c to HEP at this time  He notes no pain    Pain  Current pain ratin  At worst pain ratin  Quality: dull ache    Patient Goals  Patient goals for therapy: decreased pain, increased motion, increased strength and independence with ADLs/IADLs          Objective     Cervical/Thoracic Screen   Cervical range of motion within normal limits    Active Range of Motion   Left Shoulder   Flexion: 164 degrees   Abduction: 148 degrees   External rotation 45°: WFL  Internal rotation 0°: WFL    Right Shoulder   Flexion: 160 degrees     Passive Range of Motion   Left Shoulder   Flexion: 168 degrees   Abduction: 154 degrees with pain  External rotation 45°: 45 degrees with pain  Internal rotation 45°: [facility]    Strength/Myotome Testing     Left Shoulder     Planes of Motion   Flexion: 4   Extension: 4+   Abduction: 4   Adduction: 4   External rotation at 0°: 4   Internal rotation at 0°: 4+     Right Shoulder   Normal muscle strength                  Manuals 8/22 8/29 8/8 8/15 8/18   PROM left shoulder 10 min CD CD 10 min CD 10 min ds JF 10 min ST jt mob  xxx  xx    sidely scap retraction/ medial rotation MRE  xxx  xx    Neuro Re-Ed         Supine circles  Abduction/ flexion  3# 20x ea  Circles/abd 3#  20x 2 # 20x 2# 20x ea 2#  20 x   Prone- ext/ abduction 15x 2/10  1 #  1# 2/10 20x 20x    Supine static stabilization 30" 2x declined 30x2 2x 30" 2 x 30"    Ther Ex        Pendulums xxx xxx 2x10 ea 20x  20x   Sidely ER 2#  2/10  1#  1# 2/10 2# 2/10 2 # 2/10     Walkouts IR/ER ER green 20x  IR 20# Blue 20x/  green 20x IR CC 20#  ER green 20x IR 20#   ER L3 20x Green  ER 20x  IR 20 #    Rows, CRISTAL, tricep ext Maroon 40x declined CC 30lbs 2x15 30#  2/15 20 # 2 /15   Left only   Sidelying ER MRE 20x 2/10 2# PRE xxx xxx    Pulleys 3 min  3 min 20x 20x    UBE L6 5 min 5min 2 5/2 5 5m L6  5 min L6   Wand chest press/flex    10# 2/15 press  3# 02/15 flex    PRE- forward reach 2# 20x 2# 20x 2 lbs 2x10 2# 2/10 2 # 20x    PRE- PNF- D1/D2 2# 20x 2# 20x 2 lbs 2x10 2# 2/10 2 # 20 x    Supine PRE- chest press   2 lbs 2x10 Chest press 15# 2/10    Ther Activity        Gait Training        Modalities        CP 10 min 10 min 10 min 10m    IFC PRN                   Access Code: [email or web address]  URL: [email or web address]  Date: 05/18/2022  Prepared by: Javy Bateman    Exercises  · Seated Scapular Retraction - 1 x daily - 7 x weekly - 3 sets - 10 reps  · Seated Shoulder Shrugs - 1 x daily - 7 x weekly - 3 sets - 10 reps  · Wrist Flexion AROM - 1 x daily - 7 x weekly - 3 sets - 10 reps  · Wrist AROM Radial Ulnar Deviation - 1 x daily - 7 x weekly - 3 sets - 10 reps  · Wrist Flexion Extension AROM with Fingers Curled and Palm Down - 1 x daily - 7 x weekly - 3 sets - 10 reps  · Circular Shoulder Pendulum with Table Support - 1 x daily - 7 x weekly - 3 sets - 10 reps

## 2022-11-10 ENCOUNTER — OFFICE VISIT (OUTPATIENT)
Dept: OBGYN CLINIC | Facility: CLINIC | Age: 67
End: 2022-11-10

## 2022-11-10 VITALS
HEIGHT: 66 IN | SYSTOLIC BLOOD PRESSURE: 149 MMHG | DIASTOLIC BLOOD PRESSURE: 78 MMHG | HEART RATE: 81 BPM | BODY MASS INDEX: 33.59 KG/M2 | WEIGHT: 209 LBS

## 2022-11-10 DIAGNOSIS — Z98.890 STATUS POST LEFT ROTATOR CUFF REPAIR: Primary | ICD-10-CM

## 2022-11-10 NOTE — PROGRESS NOTES
Assessment/Plan:   Diagnoses and all orders for this visit:    Status post left rotator cuff repair    Patient presents 6 months P/O left rotator cuff repair performed 5/16/2022  Patient has progressed well  He demonstrates great strength and range of motion on today's physical exam   He is cleared to resume all activities as tolerated without limitations or restrictions  At this time, there is no need for scheduled follow-up  He will be seen should any questions or concerns arise  Patient expresses understanding is in agreement with this treatment plan  The patient is doing quite well from his left shoulder rotator cuff repair  Strength and motion intact  No rotator cuff dysfunction  Continue home exercise program   Follow up on as-needed basis for her shoulder  We will see him back for his routine total knee replacement examination and x-rays    Subjective:   Patient ID: Varun Andrea  1955     HPI  Patient is a 69 y o  male who presents for six-month follow-up evaluation s/p left shoulder arthroscopy with rotator cuff repair performed 5/16/2022  Patient states he has no complaints in regards to his left shoulder  He says that he is very satisfied with his surgical outcome overall  He denies any recent recurrence of bruising, swelling, numbness, tingling, feelings of instability, or mechanical symptoms  He also denies any associated fever, chills, headache, nausea, dizziness, or malaise      The following portions of the patient's history were reviewed and updated as appropriate:  Past medical history, past surgical history, Family history, social history, current medications and allergies    Past Medical History:   Diagnosis Date   • Arthritis    • Rotator cuff injury     left shoulder   • Seasonal allergies        Past Surgical History:   Procedure Laterality Date   • REX (HISTORICAL)     • JOINT REPLACEMENT Right 2018   • MULTIPLE TOOTH EXTRACTIONS      in office under local   • WI SHLDR ARTHROSCOP,SURG,W/ROTAT CUFF REPR Left 5/16/2022    Procedure: LEFT SHOULDER ARTHROSCOPY WITH POSSIBLE ROTATOR CUFF REPAIR;  Surgeon: Cindy Perez DO;  Location: [facility];  Service: Orthopedics   • MA TOTAL KNEE ARTHROPLASTY Right 02/26/2019    Procedure: ARTHROPLASTY KNEE TOTAL;  Surgeon: Uri Sin DO;  Location: [address] [place] [address] MAIN OR;  Service: Orthopedics   • MA TOTAL KNEE ARTHROPLASTY Left 01/13/2021    Procedure: KNEE TOTAL ARTHROPLASTY;  Surgeon: Uri Sin DO;  Location: [address] [place] [address] MAIN OR;  Service: Orthopedics   • TONSILLECTOMY         Family History   Problem Relation Age of Onset   • No Known Problems Mother    • Ulcerative colitis Father    • Ulcers Father    • No Known Problems Sister    • No Known Problems Brother        Social History     Socioeconomic History   • Marital status:      Spouse name: None   • Number of children: None   • Years of education: None   • Highest education level: None   Occupational History   • None   Tobacco Use   • Smoking status: Light Tobacco Smoker     Packs/day: 0 25     Years: 51 00     Pack years: 12 75     Types: Cigarettes   • Smokeless tobacco: Current User   • Tobacco comment: one pack a week   Vaping Use   • Vaping Use: Never used   Substance and Sexual Activity   • Alcohol use:  Yes     Alcohol/week: 4 0 standard drinks     Types: 3 Cans of beer, 1 Shots of liquor per week     Comment: 4-6 drinks a week   • Drug use: Never   • Sexual activity: Not Currently   Other Topics Concern   • None   Social History Narrative   • None     Social Determinants of Health     Financial Resource Strain: Not on file   Food Insecurity: Not on file   Transportation Needs: Not on file   Physical Activity: Not on file   Stress: Not on file   Social Connections: Not on file   Intimate Partner Violence: Not on file   Housing Stability: Not on file         Current Outpatient Medications:   •  ibuprofen (MOTRIN) 600 mg tablet, Take by mouth every 6 (six) hours as needed for mild pain, Disp: , Rfl:   •  pseudoephedrine (SUDAFED) 120 MG 12 hr tablet, Take 120 mg by mouth if needed, Disp: , Rfl:   •  ascorbic acid (VITAMIN C) 500 MG tablet, Take 1 tablet (500 mg total) by mouth 2 (two) times a day (Patient not taking: Reported on 6/2/2022), Disp: 60 tablet, Rfl: 1    Allergies   Allergen Reactions   • Other Allergic Rhinitis     Mold Spores       Review of Systems   Constitutional: Negative for chills, fever and unexpected weight change  HENT: Negative for hearing loss, nosebleeds and sore throat  Eyes: Negative for pain, redness and visual disturbance  Respiratory: Negative for cough, shortness of breath and wheezing  Cardiovascular: Negative for chest pain, palpitations and leg swelling  Gastrointestinal: Negative for abdominal pain, nausea and vomiting  Endocrine: Negative for polydipsia and polyuria  Genitourinary: Negative for dysuria and hematuria  Musculoskeletal:        As noted in HPI   Skin: Negative for rash and wound  Neurological: Negative for dizziness, numbness and headaches  Psychiatric/Behavioral: Negative for decreased concentration and suicidal ideas  The patient is not nervous/anxious           Objective:  /78 (BP Location: Left arm, Patient Position: Sitting, Cuff Size: Large)   Pulse 81   Ht 5' 6" (1 676 m)   Wt 94 8 kg (209 lb)   BMI 33 73 kg/m²     Ortho Exam  Left shoulder -   No anatomical deformity  Skin is warm and dry to touch with no signs of erythema, ecchymosis, or infection  Portal incisions are well healed  No soft tissue swelling or effusion noted  No palpable crepitus with passive motion  No tenderness to palpation on exam today  ROM  FF 0°-180°, ABD 0°-180°, ER 0°-80°  MMT 5/5 throughout  - glenohumeral instability appreciated on exam  - Neer's, - Ahumada  - Speed's, - Yergason's  - empty can, - drop-arm, - belly press, - resisted external rotation, - lift-off  Demonstrates normal elbow, wrist, and finger motion  2+ distal radial pulse with brisk capillary refill to the fingers  Radial, median, and ulnar motor and sensory distributions intact  Sensation to light touch intact distally      Physical Exam  Vitals reviewed  Constitutional:       Appearance: He is well-developed  HENT:      Head: Normocephalic and atraumatic  Nose: Nose normal    Eyes:      Conjunctiva/sclera: Conjunctivae normal    Cardiovascular:      Rate and Rhythm: Normal rate  Pulmonary:      Effort: Pulmonary effort is normal    Musculoskeletal:      Cervical back: Neck supple  Skin:     General: Skin is warm and dry  Capillary Refill: Capillary refill takes less than 2 seconds  Neurological:      Mental Status: He is alert and oriented to person, place, and time     Psychiatric:         Mood and Affect: Mood normal          Behavior: Behavior normal         Diagnostic Test Review:  No new imaging reviewed this visit    Procedures   No procedures performed this visit    Scribe Attestation    I,:  July Jackson am acting as a scribe while in the presence of the attending physician :       I,:  Abdiel Garza DO personally performed the services described in this documentation    as scribed in my presence :

## 2023-01-12 ENCOUNTER — OFFICE VISIT (OUTPATIENT)
Dept: OBGYN CLINIC | Facility: CLINIC | Age: 68
End: 2023-01-12

## 2023-01-12 ENCOUNTER — APPOINTMENT (OUTPATIENT)
Dept: RADIOLOGY | Facility: MEDICAL CENTER | Age: 68
End: 2023-01-12

## 2023-01-12 VITALS
HEIGHT: 66 IN | BODY MASS INDEX: 33.43 KG/M2 | SYSTOLIC BLOOD PRESSURE: 172 MMHG | DIASTOLIC BLOOD PRESSURE: 85 MMHG | HEART RATE: 89 BPM | WEIGHT: 208 LBS

## 2023-01-12 DIAGNOSIS — Z96.652 S/P TKR (TOTAL KNEE REPLACEMENT) USING CEMENT, LEFT: ICD-10-CM

## 2023-01-12 DIAGNOSIS — Z96.651 STATUS POST TOTAL RIGHT KNEE REPLACEMENT: Primary | ICD-10-CM

## 2023-01-12 DIAGNOSIS — Z96.651 STATUS POST TOTAL RIGHT KNEE REPLACEMENT: ICD-10-CM

## 2023-01-12 NOTE — PROGRESS NOTES
Assessment:   Diagnosis ICD-10-CM Associated Orders   1  Status post total right knee replacement  Z96 651 XR knee 3 vw right non injury      2  S/P TKR (total knee replacement) using cement, left  Z96 652 XR knee 3 vw left non injury          Plan:  Reviewed today's physical exam findings and x-ray findings with patient at time of visit  X-Ray of bilateral knee taken on 01/12/2023 were reviewed and showed well-seated total knee prosthesis with maintained anatomical alignment and no signs of loosening  The patient is 4 years s/p right total knee arthroplasty performed on 02/26/2019 and 2 years s/p left total knee arthroplasty performed on 01/13/2021  He is doing well today with no complaints of pain  He will be seen for follow-up in 1 year for re-evaluation and repeat x-rays  Patient expresses understanding and is in agreement with this treatment plan  The patient was given the opportunity to ask questions or present concerns  the patient is doing quite well from his bilateral total knee replacements  Strength and motion intact  No instability  X-rays show anatomic placement of prosthesis  Would recommend continuation of home exercise program   Follow-up in 1 year for re-evaluation with new x-rays of bilateral knees -three views each  If his condition changes, he will not hesitate to let us know    To do next visit:  Return in about 1 year (around 1/12/2024) for Annual evaluation and repeat x-rays  The above stated was discussed in layman's terms and the patient expressed understanding  All questions were answered to the patient's satisfaction       Scribe Attestation    I,:  Clarence Garza am acting as a scribe while in the presence of the attending physician :       I,:  Addie Saucedo DO personally performed the services described in this documentation    as scribed in my presence :         Subjective:   Fairy Leyden is a 69 y o  male  who presents today for an annual follow-up evaluation of his bilateral knee  The patient is 4 years s/p right total knee arthroplasty performed on 02/26/2019 and 2 years s/p left total knee arthroplasty performed on 01/13/2021  He is doing well today with no complaints of pain  He denies any recent bruising, numbness, tingling, or feelings of instability  He also denies any fevers, chills or shortness of breath    Review of systems negative unless otherwise specified in HPI  Review of Systems   Constitutional: Negative for chills and fever  HENT: Negative for ear pain and sore throat  Eyes: Negative for pain and visual disturbance  Respiratory: Negative for cough and shortness of breath  Cardiovascular: Negative for chest pain and palpitations  Gastrointestinal: Negative for abdominal pain and vomiting  Genitourinary: Negative for dysuria and hematuria  Musculoskeletal: Negative for arthralgias and back pain  Skin: Negative for color change and rash  Neurological: Negative for seizures and syncope  All other systems reviewed and are negative      Past Medical History:   Diagnosis Date   • Arthritis    • Rotator cuff injury     left shoulder   • Seasonal allergies        Past Surgical History:   Procedure Laterality Date   • COLOGUARD (HISTORICAL)     • JOINT REPLACEMENT Right 2018   • MULTIPLE TOOTH EXTRACTIONS      in office under local   • CT ARTHRP KNE CONDYLE&PLATU MEDIAL&LAT COMPARTMENTS Right 02/26/2019    Procedure: ARTHROPLASTY KNEE TOTAL;  Surgeon: Ally Hall DO;  Location: [facility] MAIN OR;  Service: Orthopedics   • CT ARTHRP KNE CONDYLE&PLATU MEDIAL&LAT COMPARTMENTS Left 01/13/2021    Procedure: KNEE TOTAL ARTHROPLASTY;  Surgeon: Ally Hall DO;  Location: [facility] MAIN OR;  Service: Orthopedics   • CT SURGICAL ARTHROSCOPY SHOULDER W/ROTATOR CUFF RPR Left 5/16/2022    Procedure: LEFT SHOULDER ARTHROSCOPY WITH POSSIBLE ROTATOR CUFF REPAIR;  Surgeon: Jess Jenkins DO;  Location: [facility];  Service: Orthopedics   • TONSILLECTOMY       Family History   Problem Relation Age of Onset   • No Known Problems Mother    • Ulcerative colitis Father    • Ulcers Father    • No Known Problems Sister    • No Known Problems Brother        Social History     Occupational History   • Not on file   Tobacco Use   • Smoking status: Light Smoker     Packs/day: 0 25     Years: 51 00     Pack years: 12 75     Types: Cigarettes   • Smokeless tobacco: Current   • Tobacco comments:     one pack a week   Vaping Use   • Vaping Use: Never used   Substance and Sexual Activity   • Alcohol use:  Yes     Alcohol/week: 4 0 standard drinks     Types: 3 Cans of beer, 1 Shots of liquor per week     Comment: 4-6 drinks a week   • Drug use: Never   • Sexual activity: Not Currently       Current Outpatient Medications:   •  ibuprofen (MOTRIN) 600 mg tablet, Take by mouth every 6 (six) hours as needed for mild pain, Disp: , Rfl:   •  pseudoephedrine (SUDAFED) 120 MG 12 hr tablet, Take 120 mg by mouth if needed, Disp: , Rfl:   •  ascorbic acid (VITAMIN C) 500 MG tablet, Take 1 tablet (500 mg total) by mouth 2 (two) times a day (Patient not taking: Reported on 6/2/2022), Disp: 60 tablet, Rfl: 1    Allergies   Allergen Reactions   • Other Allergic Rhinitis     Mold Spores     Vitals:    01/12/23 0941   BP: (!) 172/85   Pulse: 89     Objective:                    Ortho Exam  bilateral knee(s) -   Patient ambulates with steady gait pattern  Uses No assistive device  No anatomical deformity  Skin is warm and dry to touch with no signs of erythema, ecchymosis, or infection  No soft tissue swelling or effusion noted  ROM (0° - 130°)  MMT: 5/5 throughout  No tenderness to palpation on exam  Flexor and extensor mechanisms are intact   Knee is stable to varus and valgus stress  Knee is stable to anterior and posterior stress  Patella tracks centrally without palpable crepitus  Calf compartments are soft and supple  - Natali's sign  2+ DP and PT pulses with brisk capillary refill to the toes  Sural, saphenous, tibial, superficial, and deep peroneal motor and sensory distributions intact  Sensation light touch intact distally    Diagnostics, reviewed and taken today if performed as documented: The attending physician has personally reviewed the pertinent films in PACS and interpretation is as follows:    X-Ray of bilateral knee taken on 01/12/2023 were reviewed and showed well-seated total knee prosthesis with maintained anatomical alignment and no signs of loosening  Procedures, if performed today:    None performed    Portions of the record may have been created with voice recognition software  Occasional wrong word or "sound a like" substitutions may have occurred due to the inherent limitations of voice recognition software  Read the chart carefully and recognize, using context, where substitutions have occurred

## 2024-01-25 ENCOUNTER — OFFICE VISIT (OUTPATIENT)
Dept: OBGYN CLINIC | Facility: CLINIC | Age: 69
End: 2024-01-25
Payer: MEDICARE

## 2024-01-25 ENCOUNTER — APPOINTMENT (OUTPATIENT)
Dept: RADIOLOGY | Facility: MEDICAL CENTER | Age: 69
End: 2024-01-25
Payer: MEDICARE

## 2024-01-25 VITALS — WEIGHT: 208 LBS | HEIGHT: 66 IN | BODY MASS INDEX: 33.43 KG/M2

## 2024-01-25 DIAGNOSIS — Z96.653 S/P TOTAL KNEE REPLACEMENT, BILATERAL: ICD-10-CM

## 2024-01-25 DIAGNOSIS — Z96.653 S/P TOTAL KNEE REPLACEMENT, BILATERAL: Primary | ICD-10-CM

## 2024-01-25 PROCEDURE — 73562 X-RAY EXAM OF KNEE 3: CPT

## 2024-01-25 PROCEDURE — 99213 OFFICE O/P EST LOW 20 MIN: CPT | Performed by: ORTHOPAEDIC SURGERY

## 2024-01-25 NOTE — PROGRESS NOTES
Assessment/Plan:   Diagnoses and all orders for this visit:    S/p total knee replacement, bilateral  -     XR knee 3 vw right non injury; Future  -     XR knee 3 vw left non injury; Future         Reviewed physical exam and imaging with patient at time of visit. The patient is 3 years s/p Left total knee arthroplasty and 4 years s/p Right total knee arthroplasty. Radiographic findings demonstrate a well-seated prosthesis, with no signs of loosening. He continues to do well post-operatively. Continue weightbearing activities. He will follow-up in 1 year for an annual appointment, with repeat XR of his Bilateral knee. The patient expresses understanding and is in agreement with today's treatment plan.     The patient is doing quite well in regards to his bilateral total knee replacements.  There is full strength full motion.  No instability.  Continue home exercise program.  Follow-up in 12 months for evaluation with new x-rays of bilateral knees-3 views each    Subjective:   Patient ID: Gene Delgado  1955     HPI  Patient is a 68 y.o. male who presents for annual evaluation of his bilateral knees. The patient is approximately 3 years s/p left TKA, completed on 1/12/23 amnd approximately 4 yeas right TKA, completed on 2/26/19. The patient continues to reports satisfaction with his surgical outcome. He continues unassisted weightbearing activity. He has no complications with his activities of daily living. He denies pain in his knee. He denies feelings of instability. He denies weakness. He denies numbness and tingling.     The following portions of the patient's history were reviewed and updated as appropriate:  Past medical history, past surgical history, Family history, social history, current medications and allergies    Past Medical History:   Diagnosis Date    Arthritis     Rotator cuff injury     left shoulder    Seasonal allergies        Past Surgical History:   Procedure Laterality Date    [facility]  (HISTORICAL)      JOINT REPLACEMENT Right 2018    MULTIPLE TOOTH EXTRACTIONS      in office under local    [place] ARTHRP KNE CONDYLE&PLATU MEDIAL&LAT COMPARTMENTS Right 02/26/2019    Procedure: ARTHROPLASTY KNEE TOTAL;  Surgeon: Jamey Ronquillo DO;  Location:  MAIN OR;  Service: Orthopedics    [place] ARTHRP KNE CONDYLE&PLATU MEDIAL&LAT COMPARTMENTS Left 01/13/2021    Procedure: KNEE TOTAL ARTHROPLASTY;  Surgeon: Jamey Ronquillo DO;  Location:  MAIN OR;  Service: Orthopedics    [place] SURGICAL ARTHROSCOPY SHOULDER W/ROTATOR CUFF RPR Left 5/16/2022    Procedure: LEFT SHOULDER ARTHROSCOPY WITH POSSIBLE ROTATOR CUFF REPAIR;  Surgeon: Jamey Ronquillo DO;  Location: [facility];  Service: Orthopedics    TONSILLECTOMY         Family History   Problem Relation Age of Onset    No Known Problems Mother     Ulcerative colitis Father     Ulcers Father     No Known Problems Sister     No Known Problems Brother        Social History     Socioeconomic History    Marital status:      Spouse name: None    Number of children: None    Years of education: None    Highest education level: None   Occupational History    None   Tobacco Use    Smoking status: Light Smoker     Current packs/day: 0.25     Average packs/day: 0.3 packs/day for 51.0 years (12.8 ttl pk-yrs)     Types: Cigarettes    Smokeless tobacco: Current    Tobacco comments:     one pack a week   Vaping Use    Vaping status: Never Used   Substance and Sexual Activity    Alcohol use: Yes     Alcohol/week: 4.0 standard drinks of alcohol     Types: 3 Cans of beer, 1 Shots of liquor per week     Comment: 4-6 drinks a week    Drug use: Never    Sexual activity: Not Currently   Other Topics Concern    None   Social History Narrative    None     Social Determinants of Health     Financial Resource Strain: Not on file   Food Insecurity: Not on file   Transportation Needs: Not on file   Physical Activity: Not on file   Stress: Not on file   Social Connections: Not on file   Intimate  "Partner Violence: Not on file   Housing Stability: Not on file         Current Outpatient Medications:     ibuprofen (MOTRIN) 600 mg tablet, Take by mouth every 6 (six) hours as needed for mild pain, Disp: , Rfl:     pseudoephedrine (SUDAFED) 120 MG 12 hr tablet, Take 120 mg by mouth if needed, Disp: , Rfl:     ascorbic acid (VITAMIN C) 500 MG tablet, Take 1 tablet (500 mg total) by mouth 2 (two) times a day (Patient not taking: Reported on 6/2/2022), Disp: 60 tablet, Rfl: 1    Allergies   Allergen Reactions    Other Allergic Rhinitis     Mold Spores       Review of Systems   Constitutional:  Negative for chills and fever.   HENT:  Negative for ear pain and sore throat.    Eyes:  Negative for pain and visual disturbance.   Respiratory:  Negative for cough and shortness of breath.    Cardiovascular:  Negative for chest pain and palpitations.   Gastrointestinal:  Negative for abdominal pain and vomiting.   Genitourinary:  Negative for dysuria and hematuria.   Musculoskeletal:  Negative for arthralgias and back pain.   Skin:  Negative for color change and rash.   Neurological:  Negative for seizures and syncope.   All other systems reviewed and are negative.       Objective:  Ht 5' 6\" (1.676 m)   Wt 94.3 kg (208 lb)   BMI 33.57 kg/m²     Ortho Exam  bilateral knee(s) -   Patient ambulates with steady gait pattern  Uses No assistive device  No anatomical deformity  Skin is warm and dry to touch with no signs of erythema, ecchymosis, or infection   No soft tissue swelling or effusion noted  ROM (0° - 125°)   Strength: 5/5 throughout  No tenderness to palpation on exam  Flexor and extensor mechanisms are intact   Knee is stable to varus and valgus stress  - Lachman's  - Anterior Drawer, - Posterior Drawer  Patella tracks centrally without palpable crepitus  Calf compartments are soft and supple  - Natali's sign  2+ DP and PT pulses with brisk capillary refill to the toes  Sural, saphenous, tibial, superficial, and deep " peroneal motor and sensory distributions intact  Sensation light touch intact distally      Physical Exam  HENT:      Head: Normocephalic and atraumatic.      Nose: Nose normal.   Eyes:      Conjunctiva/sclera: Conjunctivae normal.   Cardiovascular:      Rate and Rhythm: Normal rate.   Pulmonary:      Effort: Pulmonary effort is normal.   Musculoskeletal:      Cervical back: Neck supple.   Skin:     General: Skin is warm and dry.      Capillary Refill: Capillary refill takes less than 2 seconds.   Neurological:      Mental Status: He is alert and oriented to person, place, and time.   Psychiatric:         Mood and Affect: Mood normal.         Behavior: Behavior normal.          Diagnostic Test Review:  The attending physician has personally reviewed the pertinent films in PACS and the interpretation is as follows:    X-Ray of bilateral knees taken on 1/25/24 were reviewed and showed well-seated total knee prosthesis with maintained anatomical alignment and no signs of loosening.      Procedures   None performed.      Scribe Attestation      I,:  Sonia Kessler am acting as a scribe while in the presence of the attending physician.:       I,:  Jamey Ronquillo DO personally performed the services described in this documentation    as scribed in my presence.:

## 2024-06-18 ENCOUNTER — APPOINTMENT (OUTPATIENT)
Dept: RADIOLOGY | Facility: CLINIC | Age: 69
End: 2024-06-18
Payer: MEDICARE

## 2024-06-18 ENCOUNTER — OFFICE VISIT (OUTPATIENT)
Dept: OBGYN CLINIC | Facility: CLINIC | Age: 69
End: 2024-06-18
Payer: MEDICARE

## 2024-06-18 VITALS
DIASTOLIC BLOOD PRESSURE: 95 MMHG | BODY MASS INDEX: 33.43 KG/M2 | WEIGHT: 208 LBS | HEIGHT: 66 IN | SYSTOLIC BLOOD PRESSURE: 159 MMHG | HEART RATE: 96 BPM

## 2024-06-18 DIAGNOSIS — M25.512 LEFT SHOULDER PAIN, UNSPECIFIED CHRONICITY: Primary | ICD-10-CM

## 2024-06-18 DIAGNOSIS — M25.512 LEFT SHOULDER PAIN, UNSPECIFIED CHRONICITY: ICD-10-CM

## 2024-06-18 PROCEDURE — 73030 X-RAY EXAM OF SHOULDER: CPT

## 2024-06-18 PROCEDURE — 99213 OFFICE O/P EST LOW 20 MIN: CPT | Performed by: ORTHOPAEDIC SURGERY

## 2024-06-18 NOTE — PROGRESS NOTES
Assessment/Plan:   Diagnoses and all orders for this visit:    Left shoulder pain, unspecified chronicity  -     XR shoulder 2+ vw left; Future         Reviewed physical exam and imaging with patient at time of visit. The patient has a history of a left rotator cuff repair, however, he recently sustained a new injury. Upon exam, he demonstrates good strength and motion. Recommended that he complete a home exercise program to maintain strength and motion. He will follow-up if symptoms persist. The patient expresses understanding and is in agreement with today's treatment plan.     The patient strained his left shoulder as he was pulling himself up in his bathroom while caulking.  There is full strength full motion.  Rotator cuff strength testing is intact.  At this point, would not recommend any further testing.  Continue home exercise program.  Follow-up on an as-needed basis for her shoulder.  If his condition changes, he would not hesitate to let us know      Subjective:   Patient ID: Gene Delgado  1955     HPI  Patient is a 68 y.o. male who presents for evaluation of his left shoulder. The patient has a history of a left rotator cuff repair, completed on 5/16/2022. The patient states that approximately 1 week ago, he was caulking his bathtub and as he attempted to stand, he pulled onto the bar and experienced pain immediately after. He denies hearing or feeling a pop. The patient states that he has been using ice, which has helped with the pain. He states that the pain has improved since the initial injury. However, he reports swelling and pain with overhead motion. He denies weakness. He denies numbness and tingling.     The following portions of the patient's history were reviewed and updated as appropriate:  Past medical history, past surgical history, Family history, social history, current medications and allergies    Past Medical History:   Diagnosis Date    Arthritis     Rotator cuff injury      left shoulder    Seasonal allergies        Past Surgical History:   Procedure Laterality Date    COLOGUARD (HISTORICAL)      JOINT REPLACEMENT Right 2018    MULTIPLE TOOTH EXTRACTIONS      in office under local    [place] ARTHRP KNE CONDYLE&PLATU MEDIAL&LAT COMPARTMENTS Right 02/26/2019    Procedure: ARTHROPLASTY KNEE TOTAL;  Surgeon: Jamey Ronquillo DO;  Location:  MAIN OR;  Service: Orthopedics    [place] ARTHRP KNE CONDYLE&PLATU MEDIAL&LAT COMPARTMENTS Left 01/13/2021    Procedure: KNEE TOTAL ARTHROPLASTY;  Surgeon: Jamey Ronquillo DO;  Location:  MAIN OR;  Service: Orthopedics    [place] SURGICAL ARTHROSCOPY SHOULDER W/ROTATOR CUFF RPR Left 5/16/2022    Procedure: LEFT SHOULDER ARTHROSCOPY WITH POSSIBLE ROTATOR CUFF REPAIR;  Surgeon: Jamey Ronquillo DO;  Location: [facility];  Service: Orthopedics    TONSILLECTOMY         Family History   Problem Relation Age of Onset    No Known Problems Mother     Ulcerative colitis Father     Ulcers Father     No Known Problems Sister     No Known Problems Brother        Social History     Socioeconomic History    Marital status:      Spouse name: None    Number of children: None    Years of education: None    Highest education level: None   Occupational History    None   Tobacco Use    Smoking status: Light Smoker     Current packs/day: 0.25     Average packs/day: 0.3 packs/day for 51.0 years (12.8 ttl pk-yrs)     Types: Cigarettes    Smokeless tobacco: Current    Tobacco comments:     one pack a week   Vaping Use    Vaping status: Never Used   Substance and Sexual Activity    Alcohol use: Yes     Alcohol/week: 4.0 standard drinks of alcohol     Types: 3 Cans of beer, 1 Shots of liquor per week     Comment: 4-6 drinks a week    Drug use: Never    Sexual activity: Not Currently   Other Topics Concern    None   Social History Narrative    None     Social Determinants of Health     Financial Resource Strain: Not on file   Food Insecurity: Not on file   Transportation Needs: Not on  "file   Physical Activity: Not on file   Stress: Not on file   Social Connections: Not on file   Intimate Partner Violence: Not on file   Housing Stability: Not on file         Current Outpatient Medications:     ibuprofen (MOTRIN) 600 mg tablet, Take by mouth every 6 (six) hours as needed for mild pain, Disp: , Rfl:     pseudoephedrine (SUDAFED) 120 MG 12 hr tablet, Take 120 mg by mouth if needed, Disp: , Rfl:     ascorbic acid (VITAMIN C) 500 MG tablet, Take 1 tablet (500 mg total) by mouth 2 (two) times a day (Patient not taking: Reported on 6/2/2022), Disp: 60 tablet, Rfl: 1    Allergies   Allergen Reactions    Other Allergic Rhinitis     Mold Spores       Review of Systems   Constitutional:  Negative for chills and fever.   HENT:  Negative for ear pain and sore throat.    Eyes:  Negative for pain and visual disturbance.   Respiratory:  Negative for cough and shortness of breath.    Cardiovascular:  Negative for chest pain and palpitations.   Gastrointestinal:  Negative for abdominal pain and vomiting.   Genitourinary:  Negative for dysuria and hematuria.   Musculoskeletal:  Negative for arthralgias and back pain.   Skin:  Negative for color change and rash.   Neurological:  Negative for seizures and syncope.   All other systems reviewed and are negative.       Objective:  /95 (BP Location: Right arm, Patient Position: Sitting, Cuff Size: Standard)   Pulse 96   Ht 5' 6\" (1.676 m)   Wt 94.3 kg (208 lb) Comment: reported  BMI 33.57 kg/m²     Ortho Exam  left Shoulder -   No anatomical deformity  Skin is warm and dry to touch with no signs of erythema, ecchymosis, or infection  No soft tissue swelling or effusion noted  No Palpable crepitus with passive motion  ROM °, , ER 90°, IR 90°  Strength: 5/5 throughout  No glenohumeral instability appreciated on exam  - Neer's, - Ahumada  - empty can, - drop-arm, - resisted external rotation, - belly press, - lift-off   Demonstrates normal elbow, " wrist, and finger motion  2+  distal radial pulse with brisk capillary refill to the fingers  Radial, median, ulnar and motor  and sensory distribution intact  Sensation to light touch intact distally      Physical Exam  HENT:      Head: Normocephalic and atraumatic.      Nose: Nose normal.   Eyes:      Conjunctiva/sclera: Conjunctivae normal.   Cardiovascular:      Rate and Rhythm: Normal rate.   Pulmonary:      Effort: Pulmonary effort is normal.   Musculoskeletal:      Cervical back: Neck supple.   Skin:     General: Skin is warm and dry.      Capillary Refill: Capillary refill takes less than 2 seconds.   Neurological:      Mental Status: He is alert and oriented to person, place, and time.   Psychiatric:         Mood and Affect: Mood normal.         Behavior: Behavior normal.          Diagnostic Test Review:    X-Ray of left shoulder obtained on 6/18/2024 were reviewed and demonstrate no acute osseous abnormalities.      Procedures   None performed.     Scribe Attestation      I,:  Sonia Kessler am acting as a scribe while in the presence of the attending physician.:       I,:  Jamey Ronquillo, DO personally performed the services described in this documentation    as scribed in my presence.:

## 2025-01-30 ENCOUNTER — OFFICE VISIT (OUTPATIENT)
Dept: OBGYN CLINIC | Facility: CLINIC | Age: 70
End: 2025-01-30
Payer: MEDICARE

## 2025-01-30 ENCOUNTER — APPOINTMENT (OUTPATIENT)
Dept: RADIOLOGY | Facility: MEDICAL CENTER | Age: 70
End: 2025-01-30
Payer: MEDICARE

## 2025-01-30 VITALS — BODY MASS INDEX: 33.75 KG/M2 | WEIGHT: 210 LBS | HEIGHT: 66 IN

## 2025-01-30 DIAGNOSIS — Z96.653 S/P TOTAL KNEE REPLACEMENT, BILATERAL: ICD-10-CM

## 2025-01-30 DIAGNOSIS — Z96.653 S/P TOTAL KNEE REPLACEMENT, BILATERAL: Primary | ICD-10-CM

## 2025-01-30 PROCEDURE — 99213 OFFICE O/P EST LOW 20 MIN: CPT | Performed by: ORTHOPAEDIC SURGERY

## 2025-01-30 PROCEDURE — 73562 X-RAY EXAM OF KNEE 3: CPT

## 2025-01-30 NOTE — PROGRESS NOTES
Assessment/Plan:   Diagnoses and all orders for this visit:    S/p total knee replacement, bilateral  -     XR knee 3 vw right non injury; Future  -     XR knee 3 vw left non injury; Future         Reviewed physical exam and imaging with patient at time of visit. The patient is 4 years s/p Left total knee arthroplasty and 6 years s/p Right total knee arthroplasty. Radiographic findings demonstrate a well-seated prosthesis, with no signs of loosening. He continues to do well post-operatively. Continue weightbearing activities. He will follow-up in 1 year for an annual appointment, with repeat XR of his Bilateral knee. The patient expresses understanding and is in agreement with today's treatment plan.      The patient is doing quite well from his bilateral total knee replacements.  There is full strength full motion.  No instability.  Continue home exercise program.  Follow-up on in 1 year for evaluation with new x-rays of bilateral knees of 3 views each    Subjective:   Patient ID: Gene Delgado  1955     HPI  Patient is a 69 y.o. male who presents for annual evaluation of his bilateral knees. The patient is approximately 3 years s/p left TKA, completed on 1/12/22 amnd approximately 6 yeas right TKA, completed on 2/26/19. The patient continues to reports satisfaction with his surgical outcome. He continues unassisted weightbearing activity. He has no complications with his activities of daily living. He denies pain in his knee. He has discomfort when he is hunting in the cold. He denies feelings of instability. He denies weakness. He denies numbness and tingling.     The following portions of the patient's history were reviewed and updated as appropriate:  Past medical history, past surgical history, Family history, social history, current medications and allergies    Past Medical History:   Diagnosis Date    Arthritis     Rotator cuff injury     left shoulder    Seasonal allergies        Past Surgical  History:   Procedure Laterality Date    COLOGUARD (HISTORICAL)      JOINT REPLACEMENT Right 2018    MULTIPLE TOOTH EXTRACTIONS      in office under local    MA ARTHRP KNE CONDYLE&PLATU MEDIAL&LAT COMPARTMENTS Right 02/26/2019    Procedure: ARTHROPLASTY KNEE TOTAL;  Surgeon: Jamey Ronquillo DO;  Location:  MAIN OR;  Service: Orthopedics    MA ARTHRP KNE CONDYLE&PLATU MEDIAL&LAT COMPARTMENTS Left 01/13/2021    Procedure: KNEE TOTAL ARTHROPLASTY;  Surgeon: Jamey Ronquillo DO;  Location:  MAIN OR;  Service: Orthopedics    MA SURGICAL ARTHROSCOPY SHOULDER W/ROTATOR CUFF RPR Left 5/16/2022    Procedure: LEFT SHOULDER ARTHROSCOPY WITH POSSIBLE ROTATOR CUFF REPAIR;  Surgeon: Jamey Ronquillo DO;  Location: [facility];  Service: Orthopedics    TONSILLECTOMY         Family History   Problem Relation Age of Onset    No Known Problems Mother     Ulcerative colitis Father     Ulcers Father     No Known Problems Sister     No Known Problems Brother        Social History     Socioeconomic History    Marital status:      Spouse name: None    Number of children: None    Years of education: None    Highest education level: None   Occupational History    None   Tobacco Use    Smoking status: Light Smoker     Current packs/day: 0.25     Average packs/day: 0.3 packs/day for 51.0 years (12.8 ttl pk-yrs)     Types: Cigarettes    Smokeless tobacco: Current    Tobacco comments:     one pack a week   Vaping Use    Vaping status: Never Used   Substance and Sexual Activity    Alcohol use: Yes     Alcohol/week: 4.0 standard drinks of alcohol     Types: 3 Cans of beer, 1 Shots of liquor per week     Comment: 4-6 drinks a week    Drug use: Never    Sexual activity: Not Currently   Other Topics Concern    None   Social History Narrative    None     Social Drivers of Health     Financial Resource Strain: Not on file   Food Insecurity: Not on file   Transportation Needs: Not on file   Physical Activity: Not on file   Stress: Not on file  "  Social Connections: Unknown (6/18/2024)    Received from [employer]     How often do you feel lonely or isolated from those around you? (Adult - for ages 18 years and over): Not on file   Intimate Partner Violence: Not on file   Housing Stability: Not on file         Current Outpatient Medications:     ibuprofen (MOTRIN) 600 mg tablet, Take by mouth every 6 (six) hours as needed for mild pain, Disp: , Rfl:     pseudoephedrine (SUDAFED) 120 MG 12 hr tablet, Take 120 mg by mouth if needed, Disp: , Rfl:     ascorbic acid (VITAMIN C) 500 MG tablet, Take 1 tablet (500 mg total) by mouth 2 (two) times a day (Patient not taking: Reported on 6/2/2022), Disp: 60 tablet, Rfl: 1    Allergies   Allergen Reactions    Other Allergic Rhinitis     Mold Spores       Review of Systems   Constitutional:  Negative for chills and fever.   HENT:  Negative for ear pain and sore throat.    Eyes:  Negative for pain and visual disturbance.   Respiratory:  Negative for cough and shortness of breath.    Cardiovascular:  Negative for chest pain and palpitations.   Gastrointestinal:  Negative for abdominal pain and vomiting.   Genitourinary:  Negative for dysuria and hematuria.   Musculoskeletal:  Negative for arthralgias and back pain.   Skin:  Negative for color change and rash.   Neurological:  Negative for seizures and syncope.   All other systems reviewed and are negative.       Objective:  Ht 5' 6\" (1.676 m)   Wt 95.3 kg (210 lb)   BMI 33.89 kg/m²     Ortho Exam  bilateral knee(s) -   Patient ambulates with steady gait pattern  Uses No assistive device  No anatomical deformity  Skin is warm and dry to touch with no signs of erythema, ecchymosis, or infection   No soft tissue swelling or effusion noted  ROM (0° - 125°)   Strength: 5/5 throughout  No tenderness to palpation on exam  Flexor and extensor mechanisms are intact   Knee is stable to varus and valgus stress  - Lachman's  - Anterior Drawer, - Posterior " Drawer  Patella tracks centrally without palpable crepitus  Calf compartments are soft and supple  - Natali's sign  2+ DP and PT pulses with brisk capillary refill to the toes  Sural, saphenous, tibial, superficial, and deep peroneal motor and sensory distributions intact  Sensation light touch intact distally      Physical Exam  HENT:      Head: Normocephalic and atraumatic.      Nose: Nose normal.   Eyes:      Conjunctiva/sclera: Conjunctivae normal.   Cardiovascular:      Rate and Rhythm: Normal rate.   Pulmonary:      Effort: Pulmonary effort is normal.   Musculoskeletal:      Cervical back: Neck supple.   Skin:     General: Skin is warm and dry.      Capillary Refill: Capillary refill takes less than 2 seconds.   Neurological:      Mental Status: He is alert and oriented to person, place, and time.   Psychiatric:         Mood and Affect: Mood normal.         Behavior: Behavior normal.          Diagnostic Test Review:  The attending physician has personally reviewed the pertinent films in PACS and the interpretation is as follows:    X-Ray of bilateral knees taken on 1/30/25 were reviewed and showed well-seated total knee prosthesis with maintained anatomical alignment and no signs of loosening.      Procedures   None performed.      Scribe Attestation      I,:  Davida Choi am acting as a scribe while in the presence of the attending physician.:       I,:  Jamey Ronquillo, DO personally performed the services described in this documentation    as scribed in my presence.:

## 2025-04-22 ENCOUNTER — APPOINTMENT (OUTPATIENT)
Dept: RADIOLOGY | Facility: MEDICAL CENTER | Age: 70
End: 2025-04-22
Attending: PHYSICIAN ASSISTANT
Payer: MEDICARE

## 2025-04-22 ENCOUNTER — OFFICE VISIT (OUTPATIENT)
Dept: URGENT CARE | Facility: MEDICAL CENTER | Age: 70
End: 2025-04-22
Payer: MEDICARE

## 2025-04-22 VITALS
DIASTOLIC BLOOD PRESSURE: 54 MMHG | TEMPERATURE: 98 F | HEART RATE: 68 BPM | HEIGHT: 67 IN | BODY MASS INDEX: 33.39 KG/M2 | SYSTOLIC BLOOD PRESSURE: 106 MMHG | OXYGEN SATURATION: 97 % | RESPIRATION RATE: 18 BRPM

## 2025-04-22 DIAGNOSIS — M25.572 ACUTE LEFT ANKLE PAIN: ICD-10-CM

## 2025-04-22 DIAGNOSIS — M79.672 LEFT FOOT PAIN: ICD-10-CM

## 2025-04-22 DIAGNOSIS — M19.072 OSTEOARTHRITIS OF LEFT ANKLE AND FOOT: Primary | ICD-10-CM

## 2025-04-22 PROCEDURE — G0463 HOSPITAL OUTPT CLINIC VISIT: HCPCS | Performed by: PHYSICIAN ASSISTANT

## 2025-04-22 PROCEDURE — 73610 X-RAY EXAM OF ANKLE: CPT

## 2025-04-22 PROCEDURE — 73630 X-RAY EXAM OF FOOT: CPT

## 2025-04-22 PROCEDURE — 99213 OFFICE O/P EST LOW 20 MIN: CPT | Performed by: PHYSICIAN ASSISTANT

## 2025-04-22 RX ORDER — METHYLPREDNISOLONE 4 MG/1
TABLET ORAL
Qty: 1 EACH | Refills: 0 | Status: SHIPPED | OUTPATIENT
Start: 2025-04-22

## 2025-04-22 NOTE — PROGRESS NOTES
[facility]        NAME: Gene Delgado is a 69 y.o. male  : 1955    MRN: [id number]  DATE: 2025  TIME: 2:27 PM    Assessment and Plan   Osteoarthritis of left ankle and foot [M19.072]  1. Osteoarthritis of left ankle and foot  methylPREDNISolone 4 MG tablet therapy pack    Ambulatory Referral to Podiatry      2. Left foot pain  XR foot 3+ vw left      3. Acute left ankle pain  XR ankle 3+ vw left              Patient Instructions     Start Medrol Dosepak  Take Tylenol as needed for pain  If symptoms fail to improve follow up with podiatry    Follow up with PCP in 3-5 days.  Proceed to  ER if symptoms worsen.    If tests have been performed at [facility], our office will contact you with results if changes need to be made to the care plan discussed with you at the visit.  [facility]    Chief Complaint     Chief Complaint   Patient presents with    Ankle Pain     Started 5 weeks ago  Left ankle pain   Ice applied, and ibuprofen  Pain 3/10 with rest, and 7/10 with weight bearing, and movement  Pain is achy, throbbing, and stabbing         History of Present Illness       Patient presents with a 5-week history of left ankle and foot pain.  Patient states he injured this 40 years ago and tore his annular ligament which was treated by casting.  He has had no problems until recently.  No recent injury.  Pain exacerbates what weightbearing.        Review of Systems   Review of Systems   Constitutional:  Negative for chills and fever.   Skin:  Negative for rash and wound.        Left ankle and foot pain         Current Medications       Current Outpatient Medications:     methylPREDNISolone 4 MG tablet therapy pack, Use as directed on package, Disp: 1 each, Rfl: 0    ascorbic acid (VITAMIN C) 500 MG tablet, Take 1 tablet (500 mg total) by mouth 2 (two) times a day (Patient not taking: Reported on 2025), Disp: 60 tablet, Rfl: 1    ibuprofen  "(MOTRIN) 600 mg tablet, Take by mouth every 6 (six) hours as needed for mild pain (Patient not taking: Reported on 4/22/2025), Disp: , Rfl:     pseudoephedrine (SUDAFED) 120 MG 12 hr tablet, Take 120 mg by mouth if needed (Patient not taking: Reported on 4/22/2025), Disp: , Rfl:     Current Allergies     Allergies as of 04/22/2025 - Reviewed 04/22/2025   Allergen Reaction Noted    Other Allergic Rhinitis 02/13/2019            The following portions of the patient's history were reviewed and updated as appropriate: allergies, current medications, past family history, past medical history, past social history, past surgical history and problem list.     Past Medical History:   Diagnosis Date    Arthritis     Rotator cuff injury     left shoulder    Seasonal allergies        Past Surgical History:   Procedure Laterality Date    COLOGUARD (HISTORICAL)      JOINT REPLACEMENT Right 2018    MULTIPLE TOOTH EXTRACTIONS      in office under local    AR ARTHRP KNE CONDYLE&PLATU MEDIAL&LAT COMPARTMENTS Right 02/26/2019    Procedure: ARTHROPLASTY KNEE TOTAL;  Surgeon: Jamey Ronquillo DO;  Location:  MAIN OR;  Service: Orthopedics    AR ARTHRP KNE CONDYLE&PLATU MEDIAL&LAT COMPARTMENTS Left 01/13/2021    Procedure: KNEE TOTAL ARTHROPLASTY;  Surgeon: Jamey Ronquillo DO;  Location:  MAIN OR;  Service: Orthopedics    AR SURGICAL ARTHROSCOPY SHOULDER W/ROTATOR CUFF RPR Left 5/16/2022    Procedure: LEFT SHOULDER ARTHROSCOPY WITH POSSIBLE ROTATOR CUFF REPAIR;  Surgeon: Jamey Ronquillo DO;  Location: [facility];  Service: Orthopedics    TONSILLECTOMY         Family History   Problem Relation Age of Onset    No Known Problems Mother     Ulcerative colitis Father     Ulcers Father     No Known Problems Sister     No Known Problems Brother          Medications have been verified.        Objective   /54   Pulse 68   Temp 98 °F (36.7 °C)   Resp 18   Ht 5' 6.5\" (1.689 m)   SpO2 97%   BMI 33.39 kg/m²   No LMP for male patient.     "   Physical Exam     Physical Exam  Vitals and nursing note reviewed.   Constitutional:       Appearance: Normal appearance.   HENT:      Head: Normocephalic and atraumatic.   Cardiovascular:      Rate and Rhythm: Normal rate.   Pulmonary:      Effort: Pulmonary effort is normal.   Musculoskeletal:      Comments: Left ankle and foot without swelling, ecchymosis, rashes or wounds.  Capillary refill brisk.  No reproducible pain on palpation of ankle or foot.   Skin:     General: Skin is warm.   Neurological:      Mental Status: He is alert.     Xray independently reviewed by me contemporaneously as degenerative changes of ankle and foot. Questionable cyst of tibia vs other pathology, no acute osseous abnormality or acute process. Awaiting radiology interpretation.

## 2025-04-22 NOTE — PATIENT INSTRUCTIONS
Start Medrol Dosepak  Take Tylenol as needed for pain  If symptoms fail to improve follow up with podiatry

## 2025-04-23 ENCOUNTER — RESULTS FOLLOW-UP (OUTPATIENT)
Dept: URGENT CARE | Facility: MEDICAL CENTER | Age: 70
End: 2025-04-23

## 2025-05-05 ENCOUNTER — OFFICE VISIT (OUTPATIENT)
Dept: PODIATRY | Facility: CLINIC | Age: 70
End: 2025-05-05
Attending: PHYSICIAN ASSISTANT
Payer: MEDICARE

## 2025-05-05 VITALS
BODY MASS INDEX: 33.75 KG/M2 | HEART RATE: 74 BPM | OXYGEN SATURATION: 99 % | HEIGHT: 66 IN | RESPIRATION RATE: 16 BRPM | WEIGHT: 210 LBS | TEMPERATURE: 98.1 F

## 2025-05-05 DIAGNOSIS — M19.072 OSTEOARTHRITIS OF LEFT ANKLE AND FOOT: ICD-10-CM

## 2025-05-05 DIAGNOSIS — M89.9 BONE LESION: Primary | ICD-10-CM

## 2025-05-05 PROCEDURE — 99203 OFFICE O/P NEW LOW 30 MIN: CPT | Performed by: PODIATRIST

## 2025-05-05 NOTE — ASSESSMENT & PLAN NOTE
Orders:    Ambulatory Referral to Podiatry    Ambulatory Referral to Podiatry; Future    Brace

## 2025-05-05 NOTE — PROGRESS NOTES
Name: Gene Delgado      : 1955      MRN: [id number]  Encounter Provider: Shira Tineo DPM  Encounter Date: 2025   Encounter department: [facility] [place]  :  Assessment & Plan  Osteoarthritis of left ankle and foot    Orders:    Ambulatory Referral to Podiatry    Ambulatory Referral to Podiatry; Future    Brace    Bone lesion    Orders:    Ambulatory Referral to Orthopedic Surgery; Future      Patient is having no pain, prednisone helped his pain  Discussed treatment for severe osteoarthritis.  Patient interested in custom braces.  Referred to [facility] for custom bracing    Reviewed bone lesion identified on x-ray.  Patient states he is asymptomatic at this time, reviewed possible pathologies.  Referral to orthopedic oncology for further evaluation, Dr. Hogan    Imaging Reviewed at this visit (I personally reviewed/independently interpreted images and reports in PACS)  XR left foot WB 3v 2025 date: No acute fracture noted, severe arthritic changes noted to midfoot and subtalar joint.  XR left ankle WB 3V: No acute fracture noted.  Severe joint space narrowing noted at the talofibular joint.  Arthritic changes noted to the ankle joint.  Joint space narrowing and arthritic changes STJ.  Osteoarthritic changes noted to midfoot.  Intramedullary sclerotic bone lesion noted at the distal tibia.    X-ray left ankle  INDICATION: M25.572: Pain in left ankle and joints of left foot.  COMPARISON: None  FINDINGS:  No acute fracture or dislocation.  Prominent hindfoot degenerative change noted..  Mixed lucent and sclerotic bone lesion distal tibia of uncertain etiology, possibly fibrous dysplasia, enchondroma. This lesion measures up to 2.6 x 4.8 cm. Older comparison studies if available would be helpful.  Unremarkable soft tissues.  IMPRESSION:  No acute osseous abnormality.  Mixed lucent/sclerotic distal tibial bone lesion, fibrous dysplasia versus enchondroma. No  pathologic fracture. If comparison outside studies are available, those would be helpful for review.     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 I have discussed with the patient the pathophysiology of this diagnosis and reviewed how the examination correlates with this diagnosis.  Urgent care note reviewed 4/22/2025.  Patient prescribed prednisone for left ankle pain  Patient presents with no left ankle pain at today's visit  I discussed ddx for left ankle pain including osteoarthritis of ankle, foot, midfoot, bone lesion  Patient counseled on osteoarthritis.  Conservative versus surgical treatment modalities reviewed including but not limited to rest, ice, elevation for acute pain.  The use of oral versus injectable steroids were reviewed.  Use of NSAIDs was reviewed.  Also briefly discussed surgical intervention such as ankle fusion and STJ fusion.  Discussed other conservative modalities for ankle osteoarthritis such as custom bracing  Rx referral to [facility] for brace consultation  Rx [place] brace  Reviewed x-ray finding of possible bone lesion.  Reviewed fibrous dysplasia and enchondroma.  They are typically benign lesions and are monitored for symptoms or changes.  Briefly reviewed bone lesions may evolve or become invasive/malignant.  Unclear if pain is related to bone lesion  Referred to orthopedic oncology Dr. Hogan for evaluation  Follow-up as needed for ankle OA    History of Present Illness   Ankle Pain       Gene Delgado is a 69 y.o. male who presents for evaluation of left ankle.  Patient is not having pain at today's evaluation.  He was having pain in his left ankle which he reports happens several times a year.  He knows he has arthritis and he has history of severe ankle  "sprains.  Typically when his ankle gets inflamed from twisting or overuse it subsides in 1 week.  He is having symptoms for approximately 3 weeks before he presented to urgent care for evaluation.  At urgent care he was given oral steroids and he states that his pain has resolved since he is initiated use.  He was referred to podiatry for further evaluation so he presented today as directed.  He denies any swelling in the area.  He is not putting ice on the area.  He wears supportive sneaker and utilizes a walker for ambulation      Review of Systems   Constitutional:  Negative for chills and fever.   Respiratory:  Negative for shortness of breath.    Cardiovascular:  Negative for leg swelling.   Musculoskeletal:  Positive for arthralgias, back pain and gait problem.   Skin:  Negative for wound.          Objective   Pulse 74   Temp 98.1 °F (36.7 °C)   Resp 16   Ht 5' 6\" (1.676 m)   Wt 95.3 kg (210 lb)   SpO2 99%   BMI 33.89 kg/m²      Physical Exam  Constitutional:       General: He is not in acute distress.     Appearance: He is not ill-appearing.   Cardiovascular:      Comments: DP/PT pulse 1/4  CRT less than 3 seconds to distal digits  Absent pedal hair growth    Musculoskeletal:      Comments: Left ankle minimal range of motion the tibiotalar joint.  Osseous reduction in range of motion.  No pain with palpation of medial lateral ankle over deltoid or ATFL, CFL, PTFL  No pain with palpation along posterior tibial tendon, no pain with palpation along peroneals  Decreased range of motion of subtalar joint  Negative anterior drawer, negative talar tilt, negative tib-fib squeeze   Skin:     Capillary Refill: Capillary refill takes less than 2 seconds.   Neurological:      Sensory: No sensory deficit.           "

## 2025-06-10 ENCOUNTER — OFFICE VISIT (OUTPATIENT)
Dept: OBGYN CLINIC | Facility: CLINIC | Age: 70
End: 2025-06-10
Payer: MEDICARE

## 2025-06-10 VITALS
WEIGHT: 180.4 LBS | HEIGHT: 67 IN | OXYGEN SATURATION: 99 % | BODY MASS INDEX: 28.31 KG/M2 | HEART RATE: 78 BPM | TEMPERATURE: 98.4 F | RESPIRATION RATE: 16 BRPM

## 2025-06-10 DIAGNOSIS — D48.0 NEOPLASM OF UNCERTAIN BEHAVIOR OF BONE AND ARTICULAR CARTILAGE: Primary | ICD-10-CM

## 2025-06-10 PROBLEM — I10 HYPERTENSION: Status: ACTIVE | Noted: 2021-04-21

## 2025-06-10 PROBLEM — M15.0 PRIMARY OSTEOARTHRITIS INVOLVING MULTIPLE JOINTS: Status: ACTIVE | Noted: 2019-02-12

## 2025-06-10 PROBLEM — M51.379 DDD (DEGENERATIVE DISC DISEASE), LUMBOSACRAL: Status: ACTIVE | Noted: 2019-02-12

## 2025-06-10 PROBLEM — M75.122 COMPLETE ROTATOR CUFF TEAR OR RUPTURE OF LEFT SHOULDER, NOT SPECIFIED AS TRAUMATIC: Status: ACTIVE | Noted: 2019-02-12

## 2025-06-10 PROBLEM — E66.811 OBESITY, CLASS I, BMI 30-34.9: Status: ACTIVE | Noted: 2019-02-12

## 2025-06-10 PROBLEM — E78.2 MIXED HYPERLIPIDEMIA: Status: ACTIVE | Noted: 2021-05-20

## 2025-06-10 PROBLEM — R70.0 ESR RAISED: Status: ACTIVE | Noted: 2021-05-20

## 2025-06-10 PROCEDURE — 99214 OFFICE O/P EST MOD 30 MIN: CPT | Performed by: STUDENT IN AN ORGANIZED HEALTH CARE EDUCATION/TRAINING PROGRAM

## 2025-06-10 NOTE — PROGRESS NOTES
"Orthopedic Surgery Office Note  Gene Delgado (69 y.o. male)   : 1955   MRN: [id number]   Encounter Date: 6/10/2025 with Dr. Rusty Hogan, DO  Encounter department: [facility] [place]  Chief Complaint   Patient presents with    Left Ankle - Pain    Left Foot - Pain     Assessment & Plan  Neoplasm of uncertain behavior of bone and articular cartilage  Noted mixed blastic/sclerotic lesions of medial malleolus and into metaphyseal tibia area on XR ankle for arthritis with podiatry. This could be due a malignancy such as a sarcoma, metastatic disease, or a benign entity, possibly related to his calcium deposition elsewhere.  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  He states that he does not specifically have gout that he knows about or any other inflammatory condition.  But what brought attention to this was significant pain and inability where weight in his left foot and ankle.  This may have been due to a gout attack  Orders:    MRI ankle/heel left w wo contrast; Future        Surgery:   No surgery planned at this time  Future surgical planning based on imaging results    Next Visit:      Follow up:  No follow-ups on file.  without XR of left ankle    Tasks:   MRI left ankle w wo    History of Present Illness:     Gene Delgado is a 69 y.o. male who presents for consultation at the request of Shira Tineo DPM , podiatry, regarding incidental finding of bone lesion     40 years ago, \"snapped annular ligaments\" of his ankle and diagnosed with bone bruise in that area with stress XR.   Able to discontinue walker in 3 weeks. Off his feet for " "about a month prior.     Review of Systems  Constitutional: Negative for fatigue, fever or loss of appetite.   HENT: Negative.    Respiratory: Negative for shortness of breath, dyspnea.    Cardiovascular: Negative for chest pain/tightness.   Gastrointestinal: Negative for abdominal pain, N/V.   Endocrine: Negative for cold/heat intolerance, unexplained weight loss/gain.   Genitourinary: Negative for flank pain, dysuria  Skin: Negative for rash.    Psychiatric/Behavioral: Negative for agitation.  All else negative unless otherwise noted in HPI    Physical Exam:   General:  Pulse 78   Temp 98.4 °F (36.9 °C) (Temporal)   Resp 16   Ht 5' 6.5\" (1.689 m)   Wt 81.8 kg (180 lb 6.4 oz)   SpO2 99%   BMI 28.68 kg/m²   Estimated body mass index is 28.68 kg/m² as calculated from the following:    Height as of this encounter: 5' 6.5\" (1.689 m).    Weight as of this encounter: 81.8 kg (180 lb 6.4 oz).  Cons: Appears well.  No apparent distress.  Psych: Alert. Oriented.  Mood and affect normal.  HEENT: PERRLA, EOMI, Eyes clear, moist mucus membranes, hearing intact  Resp: Normal effort.  No audible wheezing or stridor. equal symmetric chest expansion.  CV: Extremities warm and well perfused. no chest pain, no palpitations, no discernable arrhythmia  Well Perfused peripherally, palpable distal pulse  Abd:    No distention or guarding. no peritoneal signs  Skin: Warm. No visible lesions.no clubbing, no cyanosis  Neuro: Normal muscle tone.     Orthopedic Exam:   Left Foot & Ankle Exam  Alignment:  Normal knee alignment. Normal ankle alignment.  Inspection:  No erythema. No ecchymosis. No muscle atrophy. No deformity.  Palpation:  No effusion.  ROM:  Normal knee ROM. Normal ankle ROM.  Strength:  intact   Tests:  No pertinent positive or negative tests.  Neurovascular:  NVI distal  Gait:  normal       Imaging/Studies:     Study: XR left ankle   Date: 4/22/25  Report: I have read and agree with the radiologist report. and I have " personally reviewed the imaging via [employer] PACS (Picture Archiving and Communication System) and my impression is as follows:  Impression:   Narrative & Impression   XR ANKLE 3+ VW LEFT     INDICATION: M25.572: Pain in left ankle and joints of left foot.     COMPARISON: None     FINDINGS:     No acute fracture or dislocation.     Prominent hindfoot degenerative change noted..     Mixed lucent and sclerotic bone lesion distal tibia of uncertain etiology, possibly fibrous dysplasia, enchondroma. This lesion measures up to 2.6 x 4.8 cm. Older comparison studies if available would be helpful.     Unremarkable soft tissues.     IMPRESSION:     No acute osseous abnormality.  Mixed lucent/sclerotic distal tibial bone lesion, fibrous dysplasia versus enchondroma. No pathologic fracture. If comparison outside studies are available, those would be helpful for review.       Procedures  No procedures today.    Medical, Surgical, Family, and Social History    The patient's medical history, family history, and social history, were reviewed and updated as appropriate.  Past Medical History[1]  Past Surgical History[2]  Family History[3]  Social History     Occupational History    Not on file   Tobacco Use    Smoking status: Light Smoker     Current packs/day: 0.25     Average packs/day: 0.3 packs/day for 51.0 years (12.8 ttl pk-yrs)     Types: Cigarettes    Smokeless tobacco: Current    Tobacco comments:     one pack a week   Vaping Use    Vaping status: Never Used   Substance and Sexual Activity    Alcohol use: Yes     Alcohol/week: 4.0 standard drinks of alcohol     Types: 3 Cans of beer, 1 Shots of liquor per week     Comment: 4-6 drinks a week    Drug use: Never    Sexual activity: Not Currently     Allergies[4]  Current Medications[5]  This Visit:     I, Christofer Chapa PA-C, scribed this note in conjunction with and in the presence of Dr. Rusty Hogan DO, who performed parts of the services as described in this  documentation        Dr. Rusty Hogan DO, Orthopedic Surgeon  Orthopedic Oncology & Sarcoma Surgery          [1]   Past Medical History:  Diagnosis Date    Arthritis     Rotator cuff injury     left shoulder    Seasonal allergies    [2]   Past Surgical History:  Procedure Laterality Date    COLOGUARD (HISTORICAL)      JOINT REPLACEMENT Right 2018    MULTIPLE TOOTH EXTRACTIONS      in office under local    TN ARTHRP KNE CONDYLE&PLATU MEDIAL&LAT COMPARTMENTS Right 02/26/2019    Procedure: ARTHROPLASTY KNEE TOTAL;  Surgeon: Jamey Ronquillo DO;  Location:  MAIN OR;  Service: Orthopedics    TN ARTHRP KNE CONDYLE&PLATU MEDIAL&LAT COMPARTMENTS Left 01/13/2021    Procedure: KNEE TOTAL ARTHROPLASTY;  Surgeon: Jamey Ronquillo DO;  Location:  MAIN OR;  Service: Orthopedics    TN SURGICAL ARTHROSCOPY SHOULDER W/ROTATOR CUFF RPR Left 5/16/2022    Procedure: LEFT SHOULDER ARTHROSCOPY WITH POSSIBLE ROTATOR CUFF REPAIR;  Surgeon: Jamey Ronquillo DO;  Location: [facility];  Service: Orthopedics    TONSILLECTOMY     [3]   Family History  Problem Relation Name Age of Onset    No Known Problems Mother      Ulcerative colitis Father      Ulcers Father      No Known Problems Sister      No Known Problems Brother     [4]   Allergies  Allergen Reactions    Other Allergic Rhinitis     Mold Spores   [5]   Current Outpatient Medications:     ascorbic acid (VITAMIN C) 500 MG tablet, Take 1 tablet (500 mg total) by mouth 2 (two) times a day, Disp: 60 tablet, Rfl: 1    pseudoephedrine (SUDAFED) 120 MG 12 hr tablet, Take 120 mg by mouth if needed, Disp: , Rfl:     ibuprofen (MOTRIN) 600 mg tablet, Take by mouth every 6 (six) hours as needed for mild pain (Patient not taking: Reported on 4/22/2025), Disp: , Rfl:     methylPREDNISolone 4 MG tablet therapy pack, Use as directed on package (Patient not taking: Reported on 5/5/2025), Disp: 1 each, Rfl: 0

## 2025-06-23 ENCOUNTER — HOSPITAL ENCOUNTER (OUTPATIENT)
Dept: MRI IMAGING | Facility: HOSPITAL | Age: 70
Discharge: HOME | End: 2025-06-23
Payer: MEDICARE

## 2025-06-23 DIAGNOSIS — D48.0 NEOPLASM OF UNCERTAIN BEHAVIOR OF BONE AND ARTICULAR CARTILAGE: ICD-10-CM

## 2025-06-23 PROCEDURE — A9585 GADOBUTROL INJECTION: HCPCS

## 2025-06-23 PROCEDURE — 73723 MRI JOINT LWR EXTR W/O&W/DYE: CPT

## 2025-06-23 RX ORDER — GADOBUTROL 604.72 MG/ML
8 INJECTION INTRAVENOUS
Status: COMPLETED | OUTPATIENT
Start: 2025-06-23 | End: 2025-06-23

## 2025-06-23 RX ADMIN — GADOBUTROL 8 ML: 604.72 INJECTION INTRAVENOUS at 11:12

## 2025-07-01 VITALS
WEIGHT: 182 LBS | RESPIRATION RATE: 16 BRPM | OXYGEN SATURATION: 98 % | BODY MASS INDEX: 28.56 KG/M2 | HEIGHT: 67 IN | TEMPERATURE: 97.4 F | HEART RATE: 85 BPM

## 2025-07-01 DIAGNOSIS — M19.90 INFLAMMATORY ARTHROPATHY: Primary | ICD-10-CM

## 2025-07-01 PROCEDURE — 99214 OFFICE O/P EST MOD 30 MIN: CPT | Performed by: STUDENT IN AN ORGANIZED HEALTH CARE EDUCATION/TRAINING PROGRAM

## 2025-07-01 NOTE — PROGRESS NOTES
Orthopedic Oncology Surgery  Follow-Up Office Note  Gene Delgado (69 y.o. male)  : 1955 Encounter Date: 2025  Dr. Rusty Hogan DO, Orthopedic Surgeon  Orthopedic Oncology & Sarcoma Surgery        Impression/Plan: Gene Delgado is a 69 y.o. male with:    Name: Gene Delgado      : 1955      MRN: [id number]  Encounter Provider: Rusty Hogan DO  Encounter Date: 2025   Encounter department: [facility]  :  Assessment & Plan  Inflammatory arthropathy  Reviewed MRI of left ankle w wo with patient, showing Findings most consistent with moderate-to-severe polyarticular inflammatory (such as rheumatoid arthritis) or crystalline (such as gout or pseudogout) arthritis throughout the hindfoot and midfoot, with multiple areas of active inflammation.   Reviewed prior differential of sarcoma, metastatic disease, or a benign entity, possibly related to his calcium deposition elsewhere.  MRI demonstrated no evidence of metastatic cancer or actual cancerous entity at this time.  This is likely all related to his inflammatory disease.    Our plan is toFollow-up with myself in 4 to 6 months with new x-ray for monitoring   Rheumatology referral              Comorbidity, including h/op elevated ESR   - continue current management     Follow up: No follow-ups on file.     Surgical Planning:   No surgery planned at this time    _____________________________________________________________  Subjective:     Gene Delgado is a 69 y.o. male with hx of gouty tophi and inflammatory conditions. They present today for review of MRI right foot and ankle.    Their symptoms have improved significantly since their last visit.  Has minimal pain in his left ankle.  He is ambulating without an assistive device.  States that he has a prescription and referral to get a custom brace made in [place].  He is interested in doing this as soon as possible.    Interval HPI:   Gene  "ANDREWS Delgado is a 69 y.o. male who presents for consultation at the request of Shira Tineo DPM , podiatry, regarding incidental finding of bone lesion   40 years ago, \"snapped annular ligaments\" of his ankle and diagnosed with bone bruise in that area with stress XR.   Able to discontinue walker in 3 weeks. Off his feet for about a month prior.     Review of Systems:   Allergies, medications, past medical/surgical/family/social history have been reviewed, with the following changes: none  Complete 12 system review performed and found to be negative except: except as per mentioned in HPI.    Physical Examination:   Height: 5' 6.5\" (168.9 cm)  Weight - Scale: 82.6 kg (182 lb)  BMI (Calculated): 28.9  BSA (Calculated - m2): 1.93 sq meters  Pulse Oximetry  SpO2: 98 %     Vitals:    07/01/25 0940   Pulse: 85   Resp: 16   Temp: (!) 97.4 °F (36.3 °C)   SpO2: 98%     Body mass index is 28.94 kg/m².    General: alert and oriented;  well nourished/well developed; no apparent distress.     Psychiatric: normal mood and affect  HEENT: NCAT. Head/neck: full range of motion.   Lungs: breathing comfortably  ; equal symmetric chest expansion.   Abdomen: soft, non-tender, non-distended.   Skin: warm; dry; without  lesions, rashes, petechiae or purpura; without  clubbing, cyanosis, edema  Extremity: left ankle   Inspection: without edema, without skin abnormalities throughout   Range of motion of joints: full range of motion all extremities.   Motor strength:  WNL all extremities , intact dorsal/pedal flexion   Sensation: grossly intact to all extremities.    Pulses: intact pulses.  Lymphatics: (no obvious) lymphadenopathy  Gait: normal gait.    IMAGING RESULTS, All images personally reviewed today by Dr. Hogan.   Study: MRI right ankle w wo  Date: 6/23/25  Report: I have read and agree with the radiologist report. and I have personally reviewed the imaging via [employer] PACS (Picture Archiving and Communication System) and my " impression is as follows:  My impression is as follows: Correlating with the lytic lesion in the distal tibia on the radiograph, there is a T1 isointense, heterogeneous/mixed T2 hyperintense/hypointense, primarily rim-enhancing lesion centered in the subchondral distal tibial epiphysis and metaphysis,   measuring approximately 4.8 x 2.6 x 3.6 cm (images #4/14, #5/19, and #10/15). There is a focus of nodular internal enhancement at the anterior-distal aspect of the lesion (image #10/15), likely synovial hypertrophy. Although there is endosteal   scalloping, there is no discrete cortical destruction or extension into the soft tissues. No perilesional marrow edema. No associated periosteal reaction.  Otherwise, see below degenerative changes.  Articular Surfaces: Severe tibiotalar arthritis, with diffuse full-thickness articular cartilage loss on both sides of the joint and associated subchondral marrow edema and enhancing subchondral cysts. Moderate to severe polyarticular arthritis   throughout the hindfoot and midfoot with associated subchondral marrow edema and enhancement, as well as enhancing synovial hypertrophy and marginal erosions in the tarsometatarsal joints (for example, images #10/29, #11/6).  Subcutaneous Tissues: Normal.  Joint Effusion: None.  TENDONS:  Achilles tendon: Mild critical zone tendinosis without a tear.  Peroneus brevis and longus: Longitudinal split tear of the retro-/subfibular peroneus brevis tendon (image #4/18). Intact peroneus longus tendon.  Posterior tibialis, flexor digitorum longus, flexor hallucis longus: Normal.  Anterior tibialis, extensor digitorum longus, extensor hallucis longus: Anterior tibialis tenosynovitis without a tear. Otherwise intact tendons.    Study: XR left ankle   Date: 4/22/25  Report: I have read and agree with the radiologist report. and I have personally reviewed the imaging via [employer] PACS (Picture Archiving and Communication System) and my impression is  as follows:  Impression:   Narrative & Impression   XR ANKLE 3+ VW LEFT     INDICATION: M25.572: Pain in left ankle and joints of left foot.     COMPARISON: None     FINDINGS:     No acute fracture or dislocation.     Prominent hindfoot degenerative change noted..     Mixed lucent and sclerotic bone lesion distal tibia of uncertain etiology, possibly fibrous dysplasia, enchondroma. This lesion measures up to 2.6 x 4.8 cm. Older comparison studies if available would be helpful.     Unremarkable soft tissues.     IMPRESSION:     No acute osseous abnormality.  Mixed lucent/sclerotic distal tibial bone lesion, fibrous dysplasia versus enchondroma. No pathologic fracture. If comparison outside studies are available, those would be helpful for review.     Pathology:   N/A    _________________________________________________________________    I, Christofer Chapa PA-C, scribed this note  for Dr. Rusty Hogan DO, who performed the services as described in this documentation      Christofer Chapa PA-C   7/1/2025 9:56 AM     Dr. Rusty Hogan DO, Orthopedic Surgeon  Orthopedic Oncology & Sarcoma Surgery   Phone:[phone] Fax:[phone]

## 2025-07-10 ENCOUNTER — TELEPHONE (OUTPATIENT)
Age: 70
End: 2025-07-10

## 2025-07-10 NOTE — TELEPHONE ENCOUNTER
Caller: patient     Doctor: Dr. Hogan     Reason for call: asking why Dr Hogan wants him to see Rheum,  he is not interested in making an appt with them for arthritis.  Advised that is his choice/decision to make but it is recommended he see Rheum.     Call back#: [phone]

## (undated) DEVICE — GARMENT,MEDLINE,DVT,INT,CALF,FOAM,MED: Brand: MEDLINE

## (undated) DEVICE — SAW BLADE RECIPROCATING 179

## (undated) DEVICE — GLOVE INDICATOR PI UNDERGLOVE SZ 7.5 BLUE

## (undated) DEVICE — TIBURON SPLIT SHEET: Brand: CONVERTORS

## (undated) DEVICE — GLOVE INDICATOR PI UNDERGLOVE SZ 8 BLUE

## (undated) DEVICE — DISPOSABLE CANNULA WITH OBTURATOR, SMOOTH, 6.0 MM X 75 MM: Brand: CONMED

## (undated) DEVICE — GLOVE SRG BIOGEL 7.5

## (undated) DEVICE — NEEDLE SUT SCORPION MULTIFIRE

## (undated) DEVICE — BLADE SHAVER EXCALIBUR 4MM 13CM COOLCUT

## (undated) DEVICE — DRESSING XEROFORM 5 X 9

## (undated) DEVICE — COVER,TABLE,44X90,STERILE: Brand: MEDLINE

## (undated) DEVICE — 3M™ IOBAN™ 2 ANTIMICROBIAL INCISE DRAPE 6651EZ: Brand: IOBAN™ 2

## (undated) DEVICE — PACK PBDS SHOULDER ARTHROSCOPY RF

## (undated) DEVICE — 3M™ DURAPORE™ SURGICAL TAPE 1538-3, 3 INCH X 10 YARD (7,5CM X 9,1M), 4 ROLLS/BOX: Brand: 3M™ DURAPORE™

## (undated) DEVICE — GLOVE SRG BIOGEL 8

## (undated) DEVICE — PATELLA REAMING SYSTEM

## (undated) DEVICE — SKIN MARKER DUAL TIP WITH RULER CAP, FLEXIBLE RULER AND LABELS: Brand: DEVON

## (undated) DEVICE — ABDOMINAL PAD: Brand: DERMACEA

## (undated) DEVICE — BETHLEHEM UNIV TOTAL KNEE, KIT: Brand: CARDINAL HEALTH

## (undated) DEVICE — IMMOBILIZER SHOULDER UNIVERAL

## (undated) DEVICE — PADDING CAST 6IN COTTON STRL

## (undated) DEVICE — NO-SCRATCH ™ SMALL WHITNEY CURETTE ™ IS A SINGLE-USE, PLASTIC CURETTE FOR QUICKLY APPLYING, MANIPULATING AND REMOVING BONE CEMENT DURING HIP AND KNEE REPLACEMENT SURGERY. THE PLASTIC IS SOFTER THAN STEEL INSTRUMENTS, REDUCING THE RISK OF DAMAGING THE PROSTHESIS WITH METAL INSTRUMENTS.  THE CURETTE’S 6MM TIP REMOVES EXCESS CEMENT FROM REPLACEMENT HIPS AND KNEES. EASY-TO-MANEUVER, THE SMALL BLUE CURETTE LETS YOU REMOVE CEMENT FROM ALL EDGES OF THE PROSTHESIS.NO-SCRATCH WHITNEY SMALL CURETTE FEATURES:SAFER THAN STEEL- MADE OF PLASTIC - STURDY YET SOFTER THAN SURGICAL STEEL.HANDIER- EACH TOOL HAS A MOLDED-IN THUMB INDENTATION INSTANTLY ORIENTING THE TOOL.- EASIER TO MANEUVER IN HARD TO SEE PLACES.- COLOR-CODED FOR EASY IDENTIFICATION.FASTER- COMES INDIVIDUALLY PACKAGED IN STERILE, PEEL OPEN POUCH, READY TO GO.- APPLIES, MANIPULATES, OR REMOVES CEMENT WITH FINGERTIP PRECISION.ECONOMICAL- THE COST OF A SINGLE REVISION DWARFS THE COST OF A SINGLE-USE CURETTE. - DISPOSABLE – THERE’S NO NEED TO WASTE TIME REMOVING HARDENED CEMENT OR RE-STERILIZING TOOLS.- LESS EXPENSIVE TO BUY AND INVENTORY - ORDER ONLY THE TOOL YOU USE.- PACKAGED 25 INDIVIDUALLY WRAPPED TOOLS TO A CARTON FOR CONVENIENT SHELF STORAGE.: Brand: WHITNEY NO-SCRATCH CURETTE (SMALL)

## (undated) DEVICE — BURR  OVAL 4MM 13CM 8 FLUTE COOLCUT

## (undated) DEVICE — STOCKINETTE,IMPERVIOUS,12X48,STERILE: Brand: MEDLINE

## (undated) DEVICE — SUT VICRYL 1 CP 27 IN J196H

## (undated) DEVICE — COBAN 6 IN STERILE

## (undated) DEVICE — CANNULA DISP 8.25 X 70MM W/SEAL SIDE PORT THRD

## (undated) DEVICE — CAPIT KNEE CEM FEM/ CEM TIBIA/STD SURF/STD PAT-ZIMMER

## (undated) DEVICE — INTENDED FOR TISSUE SEPARATION, AND OTHER PROCEDURES THAT REQUIRE A SHARP SURGICAL BLADE TO PUNCTURE OR CUT.: Brand: BARD-PARKER ® CARBON RIB-BACK BLADES

## (undated) DEVICE — SURGICAL GOWN, XL SMARTSLEEVE: Brand: CONVERTORS

## (undated) DEVICE — STAPLER SKIN 35 WIDE ULC APPOSE

## (undated) DEVICE — CEMENT BOWL VACUUM MIXING

## (undated) DEVICE — GLOVE INDICATOR UNDERGLOVE SZ 8 GREEN

## (undated) DEVICE — STRAP LITHOTOMY CANDY CANE

## (undated) DEVICE — BLADE SAW HALL MICROPOWER OSC 1.27MM X 19.5MM X 86MM

## (undated) DEVICE — BAG DECANTER

## (undated) DEVICE — READY WET SKIN SCRUB TRAY-LF: Brand: MEDLINE INDUSTRIES, INC.

## (undated) DEVICE — MAT FLOOR STEP DRI 24 X 36 IN N-STRL

## (undated) DEVICE — PREP SURGICAL PURPREP 26ML

## (undated) DEVICE — SUT VICRYL 2-0 CP-1 27 IN J266H

## (undated) DEVICE — NEEDLE 18 G X 1 1/2 SAFETY

## (undated) DEVICE — TRANSPOSAL ULTRAFLEX DUO/QUAD ULTRA CART MANIFOLD

## (undated) DEVICE — COMFORT HOOD -75 EA/BX: Brand: CARDINAL HEALTH

## (undated) DEVICE — BLADE REAMER PATELLA 46MM

## (undated) DEVICE — FAN SPRAY KIT: Brand: PULSAVAC®

## (undated) DEVICE — OCCLUSIVE GAUZE STRIP,3% BISMUTH TRIBROMOPHENATE IN PETROLATUM BLEND: Brand: XEROFORM

## (undated) DEVICE — SYRINGE 50ML LL

## (undated) DEVICE — LIGHT GLOVE GREEN

## (undated) DEVICE — 3M™ STERI-DRAPE™ U-DRAPE 1015: Brand: STERI-DRAPE™

## (undated) DEVICE — TOTAL KNEE CDS: Brand: MEDLINE INDUSTRIES, INC.

## (undated) DEVICE — BLOOD CONSERVATION SYSTEM WITH QUICK DISCONNECT AND PVC DRAIN: Brand: CBCII CONSTAVAC

## (undated) DEVICE — DRAIN CHANNEL RND FULL FLUTED 19FR W/TROCAR

## (undated) DEVICE — GAUZE SPONGES,16 PLY: Brand: CURITY

## (undated) DEVICE — SPECIMEN TISSUE TRAP 12MM RIGID

## (undated) DEVICE — FRAZIER SUCTION INSTRUMENT 18 FR W/OBTURATOR, NO CONTROL VENT: Brand: FRAZIER

## (undated) DEVICE — SMARTGOWN SURGICAL GOWN, XL, LONG: Brand: CONVERTORS

## (undated) DEVICE — ACE WRAP 6 IN XL STERILE

## (undated) DEVICE — PLUMEPEN PRO 10FT

## (undated) DEVICE — GLOVE INDICATOR UNDERGLOVE SZ 7.5 GREEN

## (undated) DEVICE — HOOD: Brand: FLYTE, SURGICOOL

## (undated) DEVICE — NEEDLE SPINAL 18G X 3IN DISP

## (undated) DEVICE — SUT VICRYL 0 CP-1 27 IN J267H

## (undated) DEVICE — PENCIL ROCKER SWITCH CAUTERY HAND CONTROL

## (undated) DEVICE — AMBIENT COVAC 50 IFS: Brand: COBLATION

## (undated) DEVICE — AMBIENT MEGAVAC 90: Brand: COBLATION

## (undated) DEVICE — SCRUB SPONGE DURAPREP W/APPLI

## (undated) DEVICE — 4-PORT MANIFOLD: Brand: NEPTUNE 2

## (undated) DEVICE — TUBING SUCTION 5MM X 12 FT

## (undated) DEVICE — GLOVE SRG BIOGEL 7

## (undated) DEVICE — FIBERTAPE 2MM X 7IN AR-7237-7

## (undated) DEVICE — SUT ETHILON 3-0 INFS-1 30 IN 669H

## (undated) DEVICE — ASTOUND FABRIC REINFORCED SURGICAL GOWN: Brand: CONVERTORS

## (undated) DEVICE — MEDI-VAC YANK SUCT HNDL W/TPRD BULBOUS TIP: Brand: CARDINAL HEALTH

## (undated) DEVICE — TOWEL SURG XR DETECT GREEN STRL RFD

## (undated) DEVICE — SYRINGE 10ML LL

## (undated) DEVICE — Device

## (undated) DEVICE — SPONGE LAP STERILE 18X18

## (undated) DEVICE — NEEDLE 21 G X 1 1/2 SAFETY

## (undated) DEVICE — T-MAX DISPOSABLE FACE MASK 8 PER BOX

## (undated) DEVICE — TUBING ARTHROSCOPIC WAVE  MAIN PUMP